# Patient Record
Sex: MALE | Race: WHITE | NOT HISPANIC OR LATINO | Employment: OTHER | URBAN - NONMETROPOLITAN AREA
[De-identification: names, ages, dates, MRNs, and addresses within clinical notes are randomized per-mention and may not be internally consistent; named-entity substitution may affect disease eponyms.]

---

## 2017-01-12 ENCOUNTER — TELEPHONE (OUTPATIENT)
Dept: GASTROENTEROLOGY | Facility: CLINIC | Age: 62
End: 2017-01-12

## 2017-01-12 NOTE — TELEPHONE ENCOUNTER
01/12/2017, 1501 - Patient telephoned per this staff member with reminder of need to repeat Colonoscopy performed 11/03/2016 due to inadequate bowel preparation.  Patient acknowledged reminder and stated he has been instructed per his Primary Care Physician, Dr. Tovar and Nephrologist, Dr. Cespedes, to post phone repeat Colonoscopy at this time due to inadequate kidney functioning.  Patient instructed to contact office when clearance to proceed with Colonoscopy has been approved.  Dr. Green made aware.

## 2017-01-30 ENCOUNTER — OFFICE VISIT (OUTPATIENT)
Dept: CARDIOLOGY | Facility: CLINIC | Age: 62
End: 2017-01-30

## 2017-01-30 VITALS
SYSTOLIC BLOOD PRESSURE: 140 MMHG | WEIGHT: 315 LBS | DIASTOLIC BLOOD PRESSURE: 68 MMHG | HEART RATE: 76 BPM | BODY MASS INDEX: 42.66 KG/M2 | HEIGHT: 72 IN | OXYGEN SATURATION: 97 %

## 2017-01-30 DIAGNOSIS — E78.2 MIXED HYPERLIPIDEMIA: ICD-10-CM

## 2017-01-30 DIAGNOSIS — R94.31 ABNORMAL EKG: ICD-10-CM

## 2017-01-30 DIAGNOSIS — I10 ESSENTIAL HYPERTENSION: Primary | ICD-10-CM

## 2017-01-30 PROCEDURE — 99214 OFFICE O/P EST MOD 30 MIN: CPT | Performed by: INTERNAL MEDICINE

## 2017-01-30 RX ORDER — PRAVASTATIN SODIUM 40 MG
40 TABLET ORAL DAILY
Qty: 31 TABLET | Refills: 6 | Status: SHIPPED | OUTPATIENT
Start: 2017-01-30 | End: 2017-04-14 | Stop reason: HOSPADM

## 2017-01-30 RX ORDER — OMEPRAZOLE 40 MG/1
CAPSULE, DELAYED RELEASE ORAL
COMMUNITY
Start: 2017-01-17 | End: 2017-02-20

## 2017-01-30 NOTE — PROGRESS NOTES
Cardiovascular Medicine   Bharath Dobbins M.D., Ph.D., [facility]        History of Present Illness  This is a 61 y.o. male with:    1. HTN  2. HLD  3. Obesity  4. DM2      HTN  Concerning the patient's hypertension, the patient is currently medication compliant.  Denies side effects.  Patient's laboratory evaluations are followed closely by the PCP.      HLD  Concerning the patient's hyperlipidemia, the patient is on Lopid.     The following portions of the patient's history were reviewed and updated as appropriate: allergies, current medications, past family history, past medical history, past social history, past surgical history and problem list.      Review of Systems - History obtained from the patient  General ROS: negative  Respiratory ROS: no cough, shortness of breath, or wheezing  Cardiovascular ROS: no chest pain or dyspnea on exertion    family history includes Cancer in his other; Diabetes in his other; Other in his other; Stroke in his other.     reports that he has never smoked. He has never used smokeless tobacco. He reports that he does not drink alcohol or use illicit drugs.    No Known Allergies      Current Outpatient Prescriptions:   •  diltiaZEM (TIAZAC) 360 MG 24 hr capsule, TAKE 1 CAPSULE EVERY DAY, Disp: 30 capsule, Rfl: 5  •  Fe Bisgly-Fe [id number] (WILMAN FORTE) capsule, Take 1 tablet by mouth 2 (two) times a day., Disp: , Rfl:   •  gabapentin (NEURONTIN) 100 MG capsule, TAKE 1 CAPSULE AT BEDTIME FOR FOOT PAIN, Disp: 30 capsule, Rfl: 5  •  gemfibrozil (LOPID) 600 MG tablet, Take 600 mg by mouth 2 (two) times a day., Disp: , Rfl:   •  Glucosamine 500 MG capsule, Take 500 mg by mouth 2 (Two) Times a Day., Disp: , Rfl:   •  glucose blood test strip, by Other route. USE ONE TEST STRIP TO TEST BLOOD SUGAR THREE TIMES DAILY, Disp: , Rfl:   •  glyBURIDE (DIABETA) 5 MG tablet, TAKE 2 TABLETS BY MOUTH TWICE DAILY BEFORE MEALS, Disp: 120 tablet, Rfl: 5  •  hydrALAZINE (APRESOLINE) 25 MG  tablet, Take 1 tablet by mouth 3 (Three) Times a Day., Disp: 90 tablet, Rfl: 3  •  HYDROcodone-acetaminophen (NORCO) 5-325 MG per tablet, Take 1 tablet by mouth 4 (Four) Times a Day., Disp: 120 tablet, Rfl: 0  •  lisinopril (PRINIVIL,ZESTRIL) 20 MG tablet, Take 20 mg by mouth daily. For blood pressure, Disp: , Rfl:   •  nateglinide (STARLIX) 120 MG tablet, Take 120 mg by mouth 3 (three) times a day before meals., Disp: , Rfl:   •  Omega-3 Fatty Acids (FISH OIL) 1000 MG capsule capsule, Take 1,000 mg by mouth Daily With Breakfast., Disp: , Rfl:   •  omeprazole (priLOSEC) 40 MG capsule, , Disp: , Rfl:   •  POLY-IRON 150 150 MG capsule, TAKE 1 CAPSULE TWICE DAILY, Disp: 60 capsule, Rfl: 3  •  promethazine (PHENERGAN) 12.5 MG tablet, Take 1 tablet by mouth Every 6 (Six) Hours As Needed (prn)., Disp: 30 tablet, Rfl: 1  •  SITagliptin (JANUVIA) 100 MG tablet, Take 1 tablet by mouth Daily., Disp: 30 tablet, Rfl: 5  •  VITAMIN D, ERGOCALCIFEROL, PO, Take  by mouth Daily., Disp: , Rfl:     Physical Exam:  Vitals:    01/30/17 1513   BP: 140/68   Pulse: 76   SpO2: 97%     Body mass index is 50.7 kg/(m^2).    GEN: alert, appears stated age and cooperative  Body Habitus: obese  HEENT: Head: Normocephalic, no lesions, without obvious abnormality.  Neck / Thyroid: Supple, no masses, nodes, nodules or enlargement. No arcus senilis, xanthelasma or xanthomas. PERRL. Normal external ears. No drainage. No thyromegaly. Neck supple. No LAD. Trachea midline. Nose, normal.  JVP: 6 cm of water at 45 degrees HJR: absent      Carotid:  Upstroke: easily palpated bilaterally Volume: Normal.    Carotid Bruit:  None  Subclavian Bruit: Not present.    Lymph: No overt LAD.   Back: Normal.  Chest:  Normal Excursion: Good    I:E: Good  Pulmonary:clear to auscultation, no wheezes, rales or rhonchi, symmetric air entry. Equal chest excursion. Chest physical exam is normal. No tenderness.        Precordium:  No palpable heaves or thrusts. P2 is not  palpable.   [place]:  normal size and placement Palpable S4: Not present.   Heart rate: normal  Heart Rhythm: regular     Heart Sounds: S1: normal intensity  S2: increased intensity, increased A2  S3: absent   S4: Absent  Opening Snap: absent  A2-OS:  N/A  Pericardial rub: absent    Ejection click: None      Murmurs: Systolic: none  Diastolic: none  Extremity: no edema, cyanosis  Trophic changes: None   Pallor on elevation: Absent.    Rubor on dependency: None  Neuro: CN II-XII grossly intact.   Skin: Normal.    DATA REVIEWED:   Total Cholesterol 0 - 199 mg/dl 198   Comments: CHOL DESIRED: < 200 MG/DL   Triglycerides 20 - 199 mg/dl 128   Comments: TRIG DESIRED: < 200 MG/DL   HDL Cholesterol 60 - 200 mg/dl 36 (L)   Comments: HDL AVERAGE RISK: 35 - 60 MG/DL   LDL Cholesterol  0 - 129 mg/dl 136 (H)   Comments: Calculated LDL results only valid if Triglycerides are < 400 mg/dL.          Assessment/Plan     1. Abnormal EKG. MPS showed no evidence of ischemia or prior infarction.  Ejection fraction preserved.  TTE was structurally normal, other than diastolic dysfunction and LVH.  Reassurance and continue risk factor modifications    2.  Asymptomatic diastolic dysfunction.  Monitor for the development of symptoms    3. HTN, essential.  HD BP noted to be well controlled today in office.    DASH; medication compliance  Continue current medications    4. HLD.   Recommend statin initiation; Start Pravastatin 40mg     5. Cardiac Risk Assessment: 28% 10-yr risk.   Recommended aspirin, but defer until surgery with Dr. Sheldon  Start Pravastatin 40mg    6. Tobacco status: Non-smoker      Plan for follow-up: in  PCP

## 2017-02-06 RX ORDER — IRON POLYSACCHARIDE COMPLEX 150 MG
CAPSULE ORAL
Qty: 60 CAPSULE | Refills: 3 | Status: SHIPPED | OUTPATIENT
Start: 2017-02-06

## 2017-02-10 ENCOUNTER — HOSPITAL ENCOUNTER (OUTPATIENT)
Dept: CT IMAGING | Facility: HOSPITAL | Age: 62
Discharge: HOME | End: 2017-02-10
Admitting: INTERNAL MEDICINE

## 2017-02-10 DIAGNOSIS — K21.9 GASTROESOPHAGEAL REFLUX DISEASE WITHOUT ESOPHAGITIS: ICD-10-CM

## 2017-02-10 DIAGNOSIS — R31.9 BLOOD IN URINE: ICD-10-CM

## 2017-02-10 DIAGNOSIS — I10 ESSENTIAL HYPERTENSION, MALIGNANT: ICD-10-CM

## 2017-02-10 DIAGNOSIS — E11.9 DIABETES MELLITUS WITHOUT COMPLICATION (HCC): ICD-10-CM

## 2017-02-10 DIAGNOSIS — N17.9 ACUTE KIDNEY FAILURE, UNSPECIFIED (HCC): ICD-10-CM

## 2017-02-10 PROCEDURE — 74176 CT ABD & PELVIS W/O CONTRAST: CPT

## 2017-02-14 RX ORDER — GABAPENTIN 100 MG/1
CAPSULE ORAL
Qty: 30 CAPSULE | Refills: 5 | Status: SHIPPED | OUTPATIENT
Start: 2017-02-14

## 2017-02-15 ENCOUNTER — TRANSCRIBE ORDERS (OUTPATIENT)
Dept: LAB | Facility: HOSPITAL | Age: 62
End: 2017-02-15

## 2017-02-15 ENCOUNTER — LAB (OUTPATIENT)
Dept: LAB | Facility: HOSPITAL | Age: 62
End: 2017-02-15

## 2017-02-15 DIAGNOSIS — Z79.1 LONG TERM CURRENT USE OF NON-STEROIDAL ANTI-INFLAMMATORIES (NSAID): ICD-10-CM

## 2017-02-15 DIAGNOSIS — E13.8 DIABETES MELLITUS OF OTHER TYPE WITH COMPLICATION: ICD-10-CM

## 2017-02-15 DIAGNOSIS — R31.9 BLOOD IN URINE: ICD-10-CM

## 2017-02-15 DIAGNOSIS — I10 ESSENTIAL (PRIMARY) HYPERTENSION: ICD-10-CM

## 2017-02-15 DIAGNOSIS — K21.9 GASTROESOPHAGEAL REFLUX DISEASE, ESOPHAGITIS PRESENCE NOT SPECIFIED: ICD-10-CM

## 2017-02-15 DIAGNOSIS — N17.9 ACUTE KIDNEY FAILURE, UNSPECIFIED (HCC): ICD-10-CM

## 2017-02-15 DIAGNOSIS — N17.9 ACUTE KIDNEY FAILURE, UNSPECIFIED (HCC): Primary | ICD-10-CM

## 2017-02-15 LAB
ALBUMIN SERPL-MCNC: 3.8 G/DL (ref 3.4–4.8)
ANION GAP SERPL CALCULATED.3IONS-SCNC: 12 MMOL/L (ref 5–15)
BUN BLD-MCNC: 25 MG/DL (ref 7–21)
BUN/CREAT SERPL: 11.8 (ref 7–25)
CALCIUM SPEC-SCNC: 9.5 MG/DL (ref 8.4–10.2)
CHLORIDE SERPL-SCNC: 107 MMOL/L (ref 95–110)
CO2 SERPL-SCNC: 23 MMOL/L (ref 22–31)
CREAT BLD-MCNC: 2.12 MG/DL (ref 0.7–1.3)
ERYTHROCYTE [SEDIMENTATION RATE] IN BLOOD: 66 MM/HR (ref 0–15)
GFR SERPL CREATININE-BSD FRML MDRD: 32 ML/MIN/1.73 (ref 49–113)
GLUCOSE BLD-MCNC: 129 MG/DL (ref 60–100)
PHOSPHATE SERPL-MCNC: 2.7 MG/DL (ref 2.4–4.4)
POTASSIUM BLD-SCNC: 4 MMOL/L (ref 3.5–5.1)
SODIUM BLD-SCNC: 142 MMOL/L (ref 137–145)
URATE SERPL-MCNC: 8.7 MG/DL (ref 2.5–8.5)

## 2017-02-15 PROCEDURE — 85651 RBC SED RATE NONAUTOMATED: CPT | Performed by: INTERNAL MEDICINE

## 2017-02-15 PROCEDURE — 86160 COMPLEMENT ANTIGEN: CPT | Performed by: INTERNAL MEDICINE

## 2017-02-15 PROCEDURE — 84550 ASSAY OF BLOOD/URIC ACID: CPT | Performed by: INTERNAL MEDICINE

## 2017-02-15 PROCEDURE — 86038 ANTINUCLEAR ANTIBODIES: CPT | Performed by: INTERNAL MEDICINE

## 2017-02-15 PROCEDURE — 84156 ASSAY OF PROTEIN URINE: CPT | Performed by: INTERNAL MEDICINE

## 2017-02-15 PROCEDURE — 80069 RENAL FUNCTION PANEL: CPT | Performed by: INTERNAL MEDICINE

## 2017-02-15 PROCEDURE — 86162 COMPLEMENT TOTAL (CH50): CPT | Performed by: INTERNAL MEDICINE

## 2017-02-15 PROCEDURE — 80074 ACUTE HEPATITIS PANEL: CPT | Performed by: INTERNAL MEDICINE

## 2017-02-15 PROCEDURE — 36415 COLL VENOUS BLD VENIPUNCTURE: CPT

## 2017-02-15 PROCEDURE — 82570 ASSAY OF URINE CREATININE: CPT | Performed by: INTERNAL MEDICINE

## 2017-02-17 LAB
ANA SER QL: NEGATIVE
C3 SERPL-MCNC: 208 MG/DL (ref 82–167)
C4 SERPL-MCNC: 63 MG/DL (ref 14–44)
CH50 SERPL-ACNC: >60 U/ML (ref 42–60)
CREAT 24H UR-MCNC: 110.4 MG/DL
HAV IGM SERPL QL IA: NEGATIVE
HBV CORE IGM SERPL QL IA: NEGATIVE
HBV SURFACE AG SERPL QL IA: NEGATIVE
HCV AB SER DONR QL: NEGATIVE
PROT UR-MCNC: 55.2 MG/DL
PROT/CREAT UR: 500 MG/G CREAT (ref 0–200)

## 2017-02-20 ENCOUNTER — TELEPHONE (OUTPATIENT)
Dept: GASTROENTEROLOGY | Facility: CLINIC | Age: 62
End: 2017-02-20

## 2017-02-20 RX ORDER — PANTOPRAZOLE SODIUM 40 MG/1
40 TABLET, DELAYED RELEASE ORAL DAILY
Qty: 30 TABLET | Refills: 5 | Status: SHIPPED | OUTPATIENT
Start: 2017-02-20 | End: 2017-03-24

## 2017-02-20 NOTE — TELEPHONE ENCOUNTER
02/20/2017, 1333 - Patient telephoned [phone].  Zero answer.  Voice message submitted with date, time, office contact information, and notification of insurance denial of Omeprazole 40 mg, 1 capsule by mouth daily, due to quantity limit exceeded - 90 capsules allowed per 365 days.  Dr. Green aware.  Verbal order received per Dr. Green - Protonix 40 mg, 1 tablet by mouth daily, #30, 5 refills.  New prescription medication e-scribed to patient's pharmacy of choice, [place], [place], per this staff member.

## 2017-02-21 ENCOUNTER — DOCUMENTATION (OUTPATIENT)
Dept: GASTROENTEROLOGY | Facility: CLINIC | Age: 62
End: 2017-02-21

## 2017-02-23 RX ORDER — GLYBURIDE 5 MG/1
TABLET ORAL
Qty: 120 TABLET | Refills: 5 | Status: SHIPPED | OUTPATIENT
Start: 2017-02-23

## 2017-03-07 RX ORDER — NATEGLINIDE 120 MG/1
TABLET ORAL
Qty: 90 TABLET | Refills: 3 | Status: SHIPPED | OUTPATIENT
Start: 2017-03-07 | End: 2017-04-14 | Stop reason: HOSPADM

## 2017-03-15 ENCOUNTER — PREP FOR SURGERY (OUTPATIENT)
Dept: UROLOGY | Facility: HOSPITAL | Age: 62
End: 2017-03-15

## 2017-03-15 DIAGNOSIS — N20.0 CALCULUS OF KIDNEY: Primary | ICD-10-CM

## 2017-03-15 DIAGNOSIS — N21.0 CALCULUS IN DIVERTICULUM OF BLADDER: ICD-10-CM

## 2017-03-15 RX ORDER — LEVOFLOXACIN 5 MG/ML
500 INJECTION, SOLUTION INTRAVENOUS ONCE
Status: CANCELLED | OUTPATIENT
Start: 2017-03-29

## 2017-03-21 NOTE — H&P
[facility] Progress Note  INGRID HARRIS  61-year-old; :1955;;male  [address];[place] [address]  Ins: 1) LUCY/KATERIN  Employer: [employer];  MRN:[id number]  INGRID BRIONES MD  [facility] - [facility]  Mar. 15, 2017 11:52 AM  Allergies  No Known Drug Allergies Unknown  Current Medications  gabapentin 100 mg oral capsule  lisinopril 20 mg Tab 1 Oral  glyBURIDE 5 mg oral tablet 1 Oral  pravastatin 40 mg oral tablet  hydrALAZINE 25 mg oral tablet  metFORMIN 500 mg oral tablet 1 Oral  Poly Iron (as elemental iron) 150 mg oral capsule  Januvia 100 mg oral tablet  nateglinide 120 mg oral tablet  dilTIAZem 360 mg/24 hours oral tablet, extended  release tablet, extended release 1 Oral  * Medications Reconciled  Medical History  Diabetes mellitus  Hypertensive disorder  H/O: kidney disease  Patient reports having had a colonoscopy within  the past 9 years.  Surgical History  COLONOSCOPY  REMOVAL OF HYDROCELE  Psychiatric History  Patient is generally satisfied with life. No  depression, anxiety or thoughts of suicide.  Family History  Family history of cancer  Diabetes mellitus  Hypertensive disorder  Mother  Father  Sister Alive  Social History  Patient does not smoke. Patient does not drink  alcohol. Patient lives with wife.  Imm/Inj/Office Meds History Comments  Patient has had influenza vaccination for this  season.  Patient has had pneumococcal vaccination.  Chief Complaint  Hematuria  History of Present Illness  INGRID HARRIS is a 61-year-old male here for evaluation. He has a history of  hematuria and chronic kidney disease. CT done on 2/10/14 showed right renal stones,  bladder stone and right and left ureteral stones.  Location: Kidney/Ureters  Severity: Mild.  Onset / Duration: >1 month.  Associated signs and symptoms: No fever. Voids okay.  Review of Systems  Eyes: ; Denies: blurry vision, pain in the eyes and double  vision  ENMT: ; Denies: ear pain, sore throat and sinus problems  Cardiovascular: ; Denies: chest pain, varicose veins, angina and syncope  Respiratory: ; Denies: shortness of breath, wheezing and frequent cough  Gastrointestinal: ; Denies: abdominal pain, nausea, vomiting, indigestion and  heartburn  Genitourinary: Reports: other symptoms (see HPI)  Musculoskeletal: Reports: joint pain and back pain; Denies: neck pain  Integumentary: ; Denies: skin rash, boils and persistent itch  Neurological: ; Denies: tremors, dizzy spells and numbness / tingling  Psychiatric: Reports: generally satisfied with life; Denies: depression, suicidal  ideation and anxiety  Endocrine: Reports: cold intolerance and tired/sluggish; Denies: Excessive thirst and  heat intolerance  Hematologic/Lymphatic: ; Denies: swollen glands and blood clotting problem  Allergic/Immunologic: ; Denies: hayfever  Constitutional: ; Denies: fever, chills and weight loss  Vital Signs  3/15/2017 11:52:00 AM  Heart Rate: 87 (/min) Weight: 375 (lb)  Resp Rate: 18 (/min) Height: 73 (in)  BMI: 49.48 Never smoker  BP: 185/94 (mmHg)  Exam  General appearance: The patient appears well developed and well nourished, in no  apparent acute distress.  Examination of pupils and irises: No redness or drainage noted.  Assessment of hearing: Responds to verbal command.  Examination of neck: Neck appears supple. No masses noted.  Assessment of respiratory effort: Respiratory effort appears normal. No apparent  distress.  Examination of gait and station: Normal gait and stature.  Head and neck (Assessment of range of motion): Adequate ROM noted in all  extremities.  Head and neck (Assessment of muscle strength and tone): Muscle strength and tone  normal for age.  Inspection of skin and subcutaneous tissue: Exam of the skin is within normal limits.  The color and skin turgor are normal.  Exam  No lesions, bruises, rashes, or jaundice.  Orientation to time, place and person:  Oriented to person, place, and time.  Mood and affect: Normal mood and affect.  Data Review  Medications and chart reviewed. History and physical form/ROS reviewed and new  form completed today. CT REVIEWED BY PROVIDER.  Assessment - Calculus of kidney and ureter  DX:  Calculus of kidney and ureter  SNO: [id number], ICD-9: 592.0, ICD-10: N20.2  Urinary bladder stone  SNO: [id number], ICD-9: 594.1, ICD-10: N21.0  Plan  Plan Notes:  Bilateral CRULLS; laser lithotripsy of bladder calculus.  Education:  Kidney Stones - English  Discussion:  Discussed my findings and plan of action and reasoning behind decision making. All  questions were answered. FINDINGS WERE DISCUSSED WITH PATIENT. RISKS  AND BENEFITS EXPLAINED. PATIENT WISHES TO PROCEED.  Instructions:  Patient is instructed to call with any problems. Patient is instructed to call if the  condition worsens. Patient is instructed to call with any changes in condition.

## 2017-03-24 ENCOUNTER — APPOINTMENT (OUTPATIENT)
Dept: PREADMISSION TESTING | Facility: HOSPITAL | Age: 62
End: 2017-03-24

## 2017-03-24 VITALS — BODY MASS INDEX: 42.66 KG/M2 | HEIGHT: 72 IN | WEIGHT: 315 LBS

## 2017-03-24 DIAGNOSIS — N20.0 CALCULUS OF KIDNEY: ICD-10-CM

## 2017-03-24 LAB
ANION GAP SERPL CALCULATED.3IONS-SCNC: 14 MMOL/L (ref 5–15)
BUN BLD-MCNC: 33 MG/DL (ref 7–21)
BUN/CREAT SERPL: 15.6 (ref 7–25)
CALCIUM SPEC-SCNC: 9.3 MG/DL (ref 8.4–10.2)
CHLORIDE SERPL-SCNC: 109 MMOL/L (ref 95–110)
CO2 SERPL-SCNC: 22 MMOL/L (ref 22–31)
CREAT BLD-MCNC: 2.11 MG/DL (ref 0.7–1.3)
GFR SERPL CREATININE-BSD FRML MDRD: 32 ML/MIN/1.73 (ref 49–113)
GLUCOSE BLD-MCNC: 140 MG/DL (ref 60–100)
POTASSIUM BLD-SCNC: 4.9 MMOL/L (ref 3.5–5.1)
SODIUM BLD-SCNC: 145 MMOL/L (ref 137–145)

## 2017-03-24 PROCEDURE — 36415 COLL VENOUS BLD VENIPUNCTURE: CPT

## 2017-03-24 PROCEDURE — 80048 BASIC METABOLIC PNL TOTAL CA: CPT | Performed by: ANESTHESIOLOGY

## 2017-03-24 RX ORDER — SODIUM CHLORIDE 9 MG/ML
1000 INJECTION, SOLUTION INTRAVENOUS CONTINUOUS PRN
Status: CANCELLED | OUTPATIENT
Start: 2017-03-24

## 2017-03-24 RX ORDER — OMEPRAZOLE 40 MG/1
40 CAPSULE, DELAYED RELEASE ORAL DAILY
COMMUNITY
End: 2017-04-14 | Stop reason: HOSPADM

## 2017-03-27 RX ORDER — LISINOPRIL 20 MG/1
TABLET ORAL
Qty: 30 TABLET | Refills: 5 | Status: SHIPPED | OUTPATIENT
Start: 2017-03-27 | End: 2017-04-14 | Stop reason: HOSPADM

## 2017-03-28 ENCOUNTER — OFFICE VISIT (OUTPATIENT)
Dept: GASTROENTEROLOGY | Facility: CLINIC | Age: 62
End: 2017-03-28

## 2017-03-28 VITALS
SYSTOLIC BLOOD PRESSURE: 130 MMHG | DIASTOLIC BLOOD PRESSURE: 74 MMHG | WEIGHT: 315 LBS | HEART RATE: 82 BPM | HEIGHT: 73 IN | BODY MASS INDEX: 41.75 KG/M2

## 2017-03-28 DIAGNOSIS — K63.5 COLON POLYPS: Primary | ICD-10-CM

## 2017-03-28 PROCEDURE — 99212 OFFICE O/P EST SF 10 MIN: CPT | Performed by: INTERNAL MEDICINE

## 2017-03-28 RX ORDER — SODIUM CHLORIDE 0.9 % (FLUSH) 0.9 %
1-10 SYRINGE (ML) INJECTION AS NEEDED
Status: CANCELLED | OUTPATIENT
Start: 2017-03-28

## 2017-03-28 RX ORDER — LIDOCAINE HYDROCHLORIDE 10 MG/ML
0.1 INJECTION, SOLUTION EPIDURAL; INFILTRATION; INTRACAUDAL; PERINEURAL ONCE AS NEEDED
Status: CANCELLED | OUTPATIENT
Start: 2017-03-28

## 2017-03-28 RX ORDER — PANTOPRAZOLE SODIUM 40 MG/1
40 TABLET, DELAYED RELEASE ORAL DAILY
COMMUNITY

## 2017-03-28 RX ORDER — DEXTROSE AND SODIUM CHLORIDE 5; .45 G/100ML; G/100ML
30 INJECTION, SOLUTION INTRAVENOUS CONTINUOUS PRN
Status: CANCELLED | OUTPATIENT
Start: 2017-03-28

## 2017-03-29 ENCOUNTER — ANESTHESIA (OUTPATIENT)
Dept: PERIOP | Facility: HOSPITAL | Age: 62
End: 2017-03-29

## 2017-03-29 ENCOUNTER — APPOINTMENT (OUTPATIENT)
Dept: GENERAL RADIOLOGY | Facility: HOSPITAL | Age: 62
End: 2017-03-29

## 2017-03-29 ENCOUNTER — HOSPITAL ENCOUNTER (OUTPATIENT)
Facility: HOSPITAL | Age: 62
Setting detail: HOSPITAL OUTPATIENT SURGERY
Discharge: HOME | End: 2017-03-29
Attending: UROLOGY | Admitting: UROLOGY

## 2017-03-29 ENCOUNTER — ANESTHESIA EVENT (OUTPATIENT)
Dept: PERIOP | Facility: HOSPITAL | Age: 62
End: 2017-03-29

## 2017-03-29 ENCOUNTER — HOSPITAL ENCOUNTER (OUTPATIENT)
Facility: HOSPITAL | Age: 62
Setting detail: HOSPITAL OUTPATIENT SURGERY
End: 2017-03-29
Attending: INTERNAL MEDICINE | Admitting: INTERNAL MEDICINE

## 2017-03-29 VITALS
DIASTOLIC BLOOD PRESSURE: 56 MMHG | RESPIRATION RATE: 18 BRPM | WEIGHT: 315 LBS | HEART RATE: 88 BPM | BODY MASS INDEX: 42.66 KG/M2 | HEIGHT: 72 IN | SYSTOLIC BLOOD PRESSURE: 122 MMHG | TEMPERATURE: 98.4 F | OXYGEN SATURATION: 94 %

## 2017-03-29 DIAGNOSIS — N20.0 CALCULUS OF KIDNEY: ICD-10-CM

## 2017-03-29 DIAGNOSIS — N20.0 KIDNEY STONE: ICD-10-CM

## 2017-03-29 DIAGNOSIS — N21.0 BLADDER STONE: Primary | ICD-10-CM

## 2017-03-29 LAB
BILIRUB UR QL STRIP: NEGATIVE
CLARITY UR: ABNORMAL
COLOR UR: ABNORMAL
GLUCOSE BLDC GLUCOMTR-MCNC: 103 MG/DL (ref 70–130)
GLUCOSE BLDC GLUCOMTR-MCNC: 147 MG/DL (ref 70–130)
GLUCOSE UR STRIP-MCNC: NEGATIVE MG/DL
HGB UR QL STRIP.AUTO: ABNORMAL
KETONES UR QL STRIP: NEGATIVE
LEUKOCYTE ESTERASE UR QL STRIP.AUTO: ABNORMAL
NITRITE UR QL STRIP: NEGATIVE
PH UR STRIP.AUTO: <=5 [PH] (ref 5–9)
PROT UR QL STRIP: ABNORMAL
SP GR UR STRIP: 1.02 (ref 1–1.03)
UROBILINOGEN UR QL STRIP: ABNORMAL

## 2017-03-29 PROCEDURE — C1758 CATHETER, URETERAL: HCPCS | Performed by: UROLOGY

## 2017-03-29 PROCEDURE — 76000 FLUOROSCOPY <1 HR PHYS/QHP: CPT

## 2017-03-29 PROCEDURE — 0 IOPAMIDOL 61 % SOLUTION: Performed by: UROLOGY

## 2017-03-29 PROCEDURE — 81003 URINALYSIS AUTO W/O SCOPE: CPT | Performed by: UROLOGY

## 2017-03-29 PROCEDURE — 82360 CALCULUS ASSAY QUANT: CPT | Performed by: UROLOGY

## 2017-03-29 PROCEDURE — C1769 GUIDE WIRE: HCPCS | Performed by: UROLOGY

## 2017-03-29 PROCEDURE — 25010000002 LEVOFLOXACIN PER 250 MG: Performed by: UROLOGY

## 2017-03-29 PROCEDURE — 74420 UROGRAPHY RTRGR +-KUB: CPT

## 2017-03-29 PROCEDURE — C2617 STENT, NON-COR, TEM W/O DEL: HCPCS | Performed by: UROLOGY

## 2017-03-29 PROCEDURE — 25010000002 MIDAZOLAM PER 1 MG: Performed by: NURSE ANESTHETIST, CERTIFIED REGISTERED

## 2017-03-29 PROCEDURE — 82962 GLUCOSE BLOOD TEST: CPT

## 2017-03-29 PROCEDURE — 25010000002 FENTANYL CITRATE (PF) 100 MCG/2ML SOLUTION: Performed by: NURSE ANESTHETIST, CERTIFIED REGISTERED

## 2017-03-29 PROCEDURE — 25010000002 PROPOFOL 10 MG/ML EMULSION: Performed by: NURSE ANESTHETIST, CERTIFIED REGISTERED

## 2017-03-29 DEVICE — URETERAL STENT
Type: IMPLANTABLE DEVICE | Status: FUNCTIONAL
Brand: PERCUFLEX™ PLUS

## 2017-03-29 RX ORDER — MIDAZOLAM HYDROCHLORIDE 1 MG/ML
INJECTION INTRAMUSCULAR; INTRAVENOUS AS NEEDED
Status: DISCONTINUED | OUTPATIENT
Start: 2017-03-29 | End: 2017-03-29 | Stop reason: SURG

## 2017-03-29 RX ORDER — ACETAMINOPHEN 650 MG/1
650 SUPPOSITORY RECTAL ONCE AS NEEDED
Status: DISCONTINUED | OUTPATIENT
Start: 2017-03-29 | End: 2017-03-29 | Stop reason: HOSPADM

## 2017-03-29 RX ORDER — LABETALOL HYDROCHLORIDE 5 MG/ML
5 INJECTION, SOLUTION INTRAVENOUS
Status: DISCONTINUED | OUTPATIENT
Start: 2017-03-29 | End: 2017-03-29 | Stop reason: HOSPADM

## 2017-03-29 RX ORDER — SODIUM CHLORIDE 9 MG/ML
1000 INJECTION, SOLUTION INTRAVENOUS CONTINUOUS PRN
Status: DISCONTINUED | OUTPATIENT
Start: 2017-03-29 | End: 2017-03-29 | Stop reason: HOSPADM

## 2017-03-29 RX ORDER — PROPOFOL 10 MG/ML
VIAL (ML) INTRAVENOUS AS NEEDED
Status: DISCONTINUED | OUTPATIENT
Start: 2017-03-29 | End: 2017-03-29 | Stop reason: SURG

## 2017-03-29 RX ORDER — ACETAMINOPHEN 325 MG/1
650 TABLET ORAL ONCE AS NEEDED
Status: DISCONTINUED | OUTPATIENT
Start: 2017-03-29 | End: 2017-03-29 | Stop reason: HOSPADM

## 2017-03-29 RX ORDER — LEVOFLOXACIN 5 MG/ML
500 INJECTION, SOLUTION INTRAVENOUS ONCE
Status: COMPLETED | OUTPATIENT
Start: 2017-03-29 | End: 2017-03-29

## 2017-03-29 RX ORDER — NALOXONE HCL 0.4 MG/ML
0.2 VIAL (ML) INJECTION AS NEEDED
Status: DISCONTINUED | OUTPATIENT
Start: 2017-03-29 | End: 2017-03-29 | Stop reason: HOSPADM

## 2017-03-29 RX ORDER — DIPHENHYDRAMINE HYDROCHLORIDE 50 MG/ML
12.5 INJECTION INTRAMUSCULAR; INTRAVENOUS
Status: DISCONTINUED | OUTPATIENT
Start: 2017-03-29 | End: 2017-03-29 | Stop reason: HOSPADM

## 2017-03-29 RX ORDER — DOXYCYCLINE HYCLATE 100 MG/1
100 CAPSULE ORAL DAILY
Qty: 7 CAPSULE | Refills: 0 | Status: SHIPPED | OUTPATIENT
Start: 2017-03-29 | End: 2017-04-14 | Stop reason: HOSPADM

## 2017-03-29 RX ORDER — OXYCODONE AND ACETAMINOPHEN 7.5; 325 MG/1; MG/1
1-2 TABLET ORAL EVERY 4 HOURS PRN
Qty: 15 TABLET | Refills: 0 | Status: SHIPPED | OUTPATIENT
Start: 2017-03-29 | End: 2017-04-14 | Stop reason: HOSPADM

## 2017-03-29 RX ORDER — FENTANYL CITRATE 50 UG/ML
INJECTION, SOLUTION INTRAMUSCULAR; INTRAVENOUS AS NEEDED
Status: DISCONTINUED | OUTPATIENT
Start: 2017-03-29 | End: 2017-03-29 | Stop reason: SURG

## 2017-03-29 RX ORDER — FLUMAZENIL 0.1 MG/ML
0.2 INJECTION INTRAVENOUS AS NEEDED
Status: DISCONTINUED | OUTPATIENT
Start: 2017-03-29 | End: 2017-03-29 | Stop reason: HOSPADM

## 2017-03-29 RX ORDER — ONDANSETRON 2 MG/ML
4 INJECTION INTRAMUSCULAR; INTRAVENOUS ONCE AS NEEDED
Status: DISCONTINUED | OUTPATIENT
Start: 2017-03-29 | End: 2017-03-29 | Stop reason: HOSPADM

## 2017-03-29 RX ADMIN — PROPOFOL 200 MG: 10 INJECTION, EMULSION INTRAVENOUS at 18:10

## 2017-03-29 RX ADMIN — MIDAZOLAM 1 MG: 1 INJECTION INTRAMUSCULAR; INTRAVENOUS at 18:09

## 2017-03-29 RX ADMIN — MIDAZOLAM 1 MG: 1 INJECTION INTRAMUSCULAR; INTRAVENOUS at 18:05

## 2017-03-29 RX ADMIN — SODIUM CHLORIDE 1000 ML: 9 INJECTION, SOLUTION INTRAVENOUS at 13:51

## 2017-03-29 RX ADMIN — LEVOFLOXACIN 500 MG: 5 INJECTION, SOLUTION INTRAVENOUS at 18:13

## 2017-03-29 RX ADMIN — FENTANYL CITRATE 50 MCG: 50 INJECTION, SOLUTION INTRAMUSCULAR; INTRAVENOUS at 18:10

## 2017-03-29 NOTE — ANESTHESIA PROCEDURE NOTES
Airway    General Information and Staff    Patient location during procedure: OR  CRNA: DIEGO WOODSON    Indications and Patient Condition  Indications for airway management: airway protection    Preoxygenated: yes      Final Airway Details  Final airway type: supraglottic airway      Successful airway: classic  Size 4    Number of attempts at approach: 1

## 2017-03-29 NOTE — OP NOTE
CYSTOSCOPY, URETEROSCOPY, RETROGRADE PYELOGRAM, HOLMIUM LASER, STENT INSERTION  Procedure Note    Blake Chavez  3/29/2017    Pre-op Diagnosis:   Calculus of kidney [N20.0]    Post-op Diagnosis:     Post-Op Diagnosis Codes:     * Calculus of kidney [N20.0]      Procedure(s):  CYSTOSCOPY, BILATERAL RETROGRADE,  RIGHT URETEROSCOPY AND LASER LITHOTRIPSY WITH STENT PLACEMENT; LASER LITHOTRIPSY BLADDER CALCULI    Surgeon(s):  Blake Lopez MD    Anesthesia: General    Staff:   Circulator: Aziza Prince RN  Scrub Person: Yeni Lam V  Assistant: Bobbi Carolina CSA    Estimated Blood Loss: * No values recorded between 3/29/2017  6:05 PM and 3/29/2017  6:43 PM *    Specimens:                * No specimens in log *      Drains:           Findings: Bladder stone 1.0 cm, right renal pelvis with stones.  Clear left renal pelvis    Complications: None    Indications: Hematuria and pain    Description of Procedure: Patient was brought to the cystoscopy suite and placed in the dorsal lithotomy position.  Once this occurred we placed a 22 cystoscope into the bladder after sterile prep and drape.  Engaged a 1.0 cm bladder stone.  1000 µ fiber and blasted this with a prior setting of 3.5 continuous pulses.  We flushed out the stone debris from the bladder itself with the advisorCONNECT evacuator.  This would place a retrograde catheter up the right ureter and injected 6 cc of contrast.  Right renal filling defects was noted as consistent with stones.  We put a 0.38 guidewire guidewire back in the right renal pelvis and an over-the-top of the guidewire we placed a 26 double-J stent.  We turned our attention to the left ureter.  Put a retrograde catheter up the ureter and injected 6 cc of contrast.  The left renal pelvis collecting system all was clear. Once this was accomplished we drained the bladder and removed the scope.  Fluoroscopy showed J stent was in good position on the right-hand side.    Blake Lopez MD      Date: 3/29/2017  Time: 6:43 PM

## 2017-03-29 NOTE — ANESTHESIA POSTPROCEDURE EVALUATION
Patient: Blake Chavez    Procedure Summary     Date Anesthesia Start Anesthesia Stop Room / Location    03/29/17 1809 3395 [facility] OR 02 / [facility] OR       Procedure Diagnosis Surgeon Provider    CYSTOSCOPY, BILATERAL RETROGRADE, URETEROSCOPY, LASER LITHOTRIPSY, STENT PLACEMENT; LASER LITHOTRIPSY BLADDER CALCULI (Bilateral ) Calculus of kidney  (Calculus of kidney [N20.0]) MD Meri Dukes MD          Anesthesia Type: general  Last vitals  BP      Temp      Pulse     Resp      SpO2        Post Anesthesia Care and Evaluation    Patient location during evaluation: PACU  Patient participation: complete - patient participated  Level of consciousness: awake and alert  Pain management: adequate  Airway patency: patent  Anesthetic complications: No anesthetic complications    Cardiovascular status: acceptable  Respiratory status: acceptable  Hydration status: acceptable

## 2017-03-29 NOTE — ANESTHESIA PREPROCEDURE EVALUATION
Anesthesia Evaluation     NPO Status: > 8 hours   Airway   Mallampati: II  TM distance: >3 FB  Neck ROM: full  possible difficult intubation  Dental    (+) poor dentation    Comment: Lower crowns;overall poor repair.    Pulmonary - normal exam    breath sounds clear to auscultation  Cardiovascular - normal exam    ECG reviewed  Rhythm: regular  Rate: normal    (+) hypertension well controlled,     ROS comment: EKG:NSR    Neuro/Psych  GI/Hepatic/Renal/Endo    (+) morbid obesity, chronic renal disease (Creatinine 2.11) stones, diabetes mellitus type 2 well controlled,     Musculoskeletal     Abdominal   (+) obese,    Substance History      OB/GYN          Other   (+) arthritis                                 Anesthesia Plan    ASA 4     general     intravenous induction   Anesthetic plan and risks discussed with patient and spouse/significant other.

## 2017-03-30 NOTE — PLAN OF CARE
Problem: Patient Care Overview (Adult)  Goal: Plan of Care Review  Outcome: Ongoing (interventions implemented as appropriate)    03/29/17 3962   Coping/Psychosocial Response Interventions   Plan Of Care Reviewed With patient   Patient Care Overview   Progress improving   Outcome Evaluation   Outcome Summary/Follow up Plan PACU DC criteria met. Pt. awake, alert, oriented. Pt. denies pain or nausea, requesting to go to the bathroom. Only complains of urinary urgency. Declines pain medication in PACU. Declines use of urinal or assistance with urinal.

## 2017-04-01 NOTE — PROGRESS NOTES
Chief Complaint   Patient presents with   • Colonoscopy Consultation        Blake Chavez is a  61 y.o. male here for a follow up visit for colonic polyps     HPI :  The patient is seen to arrange follow-up colonoscopy and attempt to remove the rather large polyp that 100 cm level.  We took off polyps at the time of his last colonoscopy but it that time prep was such that could not get a good look at the larger polyp.  He returns presently for that.    In addition he had a large venous structure on his perineum that was seen with Dr. Sheldon in addition he has had other medical problems including most recently a kidney stone for which she is scheduled for removal later this week.    Past Medical History:   Diagnosis Date   • Acute cystitis    • Calculus of kidney and ureter     recently passed      • Chest pain    • Crohn's disease    • Edema     unspecified - bilateral lower extremities     • Essential hypertension    • Hip pain    • Hypertensive disorder    • Knee pain    • Morbid obesity    • Nausea    • Nuclear senile cataract    • Osteoarthritis of multiple joints    • Paraumbilical hernia    • Type 2 diabetes mellitus      no BDR    • Type 2 diabetes mellitus      no BDR        Current Outpatient Prescriptions   Medication Sig Dispense Refill   • diltiaZEM (TIAZAC) 360 MG 24 hr capsule TAKE 1 CAPSULE EVERY DAY 30 capsule 5   • FE BISGLY-FE [id number]-I-[id number]-FA PO Take  by mouth. 1 tablet by mouth 2 times per day     • gabapentin (NEURONTIN) 100 MG capsule TAKE 1 CAPSULE AT BEDTIME FOR FOOT PAIN 30 capsule 5   • Glucosamine 500 MG capsule Take 500 mg by mouth 2 (Two) Times a Day.     • glyBURIDE (DIAbeta) 5 MG tablet TAKE 2 TABLETS TWICE DAILY BEFORE MEALS 120 tablet 5   • hydrALAZINE (APRESOLINE) 25 MG tablet Take 1 tablet by mouth 3 (Three) Times a Day. 90 tablet 3   • lisinopril (PRINIVIL,ZESTRIL) 20 MG tablet TAKE 1 TABLET BY MOUTH EVERY DAY FOR BLOOD PRESSURE 30 tablet 5   • nateglinide (STARLIX) 120 MG  tablet TAKE 1 TABLET BY MOUTH BEFORE MEALS 90 tablet 3   • Omega-3 Fatty Acids (FISH OIL) 1000 MG capsule capsule Take 1,000 mg by mouth Daily With Breakfast.     • omeprazole (priLOSEC) 40 MG capsule Take 40 mg by mouth Daily.     • ONE TOUCH ULTRA TEST test strip USE THREE TIMES DAILY 100 each 11   • pantoprazole (PROTONIX) 40 MG EC tablet Take 40 mg by mouth Daily.     • POLY-IRON 150 150 MG capsule TAKE 1 CAPSULE TWICE DAILY 60 capsule 3   • pravastatin (PRAVACHOL) 40 MG tablet Take 1 tablet by mouth Daily. 31 tablet 6   • promethazine (PHENERGAN) 12.5 MG tablet Take 1 tablet by mouth Every 6 (Six) Hours As Needed (prn). 30 tablet 1   • SITagliptin (JANUVIA) 100 MG tablet Take 1 tablet by mouth Daily. 30 tablet 5   • VITAMIN D, ERGOCALCIFEROL, PO Take  by mouth Daily.     • doxycycline (VIBRAMYCIN) 100 MG capsule Take 1 capsule by mouth Daily for 7 days. 7 capsule 0   • oxyCODONE-acetaminophen (PERCOCET) 7.5-325 MG per tablet Take 1-2 tablets by mouth Every 4 (Four) Hours As Needed (Pain). 15 tablet 0     No current facility-administered medications for this visit.        PRN Meds:.    No Known Allergies    Social History     Social History   • Marital status:      Spouse name: N/A   • Number of children: N/A   • Years of education: N/A     Occupational History   • Not on file.     Social History Main Topics   • Smoking status: Never Smoker   • Smokeless tobacco: Never Used   • Alcohol use No   • Drug use: No   • Sexual activity: Defer     Other Topics Concern   • Not on file     Social History Narrative       Family History   Problem Relation Age of Onset   • Cancer Other    • Diabetes Other    • Stroke Other    • Other Other      Colon problems        Review of Systems   Constitutional: Negative for activity change, chills, diaphoresis and unexpected weight change.   Cardiovascular: Negative for chest pain.   Gastrointestinal: Positive for abdominal pain. Negative for abdominal distention, anal  bleeding, blood in stool, constipation, diarrhea, nausea, rectal pain and vomiting.   Musculoskeletal: Negative for arthralgias and myalgias.       Vitals:    03/28/17 1546   BP: 130/74   Pulse: 82       Physical Exam   Constitutional: He appears well-developed and well-nourished.   Cardiovascular: Normal rate, regular rhythm and normal heart sounds.  Exam reveals no gallop and no friction rub.    No murmur heard.  Pulmonary/Chest: Effort normal and breath sounds normal. No respiratory distress. He has no rales.   Abdominal: Soft. Normal appearance and bowel sounds are normal. He exhibits no distension and no ascites. There is no hepatosplenomegaly, splenomegaly or hepatomegaly. There is no tenderness. No hernia.   Nursing note and vitals reviewed.      ASSESSMENT AND PLAN      ICD-10-CM ICD-9-CM   1. Colon polyps K63.5 211.3      Plan:  Colonoscopy with removal of the remaining polyp.          This document has been electronically signed by Nestor Green MD on April 1, 2017 4:47 PM

## 2017-04-04 ENCOUNTER — HOSPITAL ENCOUNTER (OUTPATIENT)
Facility: HOSPITAL | Age: 62
Setting detail: HOSPITAL OUTPATIENT SURGERY
End: 2017-04-04
Attending: UROLOGY | Admitting: UROLOGY

## 2017-04-04 ENCOUNTER — PREP FOR SURGERY (OUTPATIENT)
Dept: UROLOGY | Facility: HOSPITAL | Age: 62
End: 2017-04-04

## 2017-04-04 DIAGNOSIS — N20.0 CALCULUS OF KIDNEY: Primary | ICD-10-CM

## 2017-04-04 RX ORDER — LEVOFLOXACIN 5 MG/ML
500 INJECTION, SOLUTION INTRAVENOUS ONCE
Status: CANCELLED | OUTPATIENT
Start: 2017-04-07

## 2017-04-04 NOTE — H&P
[facility] Progress Note  INGRID HARRIS  61-year-old; :1955;;male  [address];[place] [address]  Ins: 1) [employer]UCY[employer]  Employer: [employer];  MRN:[id number]  INGRID BRIONES MD  [facility]  2017 3:22 PM  Allergies  No Known Drug Allergies Unknown  Current Medications  gabapentin 100 mg oral capsule  lisinopril 20 mg Tab 1 Oral  glyBURIDE 5 mg oral tablet 1 Oral  pravastatin 40 mg oral tablet  hydrALAZINE 25 mg oral tablet  metFORMIN 500 mg oral tablet 1 Oral  Poly Iron (as elemental iron) 150 mg oral capsule  Januvia 100 mg oral tablet  nateglinide 120 mg oral tablet  dilTIAZem 360 mg/24 hours oral tablet, extended  release tablet, extended release 1 Oral  * Medications Reconciled  Medical History  Diabetes mellitus  Hypertensive disorder  H/O: kidney disease  Patient reports having had a colonoscopy within  the past 9 years.  Surgical History  COLONOSCOPY  REMOVAL OF HYDROCELE  Psychiatric History  Patient is generally satisfied with life. No  depression, anxiety or thoughts of suicide.  Family History  Family history of cancer  Diabetes mellitus  Hypertensive disorder  Mother  Father  Sister Alive  Social History  Patient does not smoke. Patient does not drink  alcohol. Patient lives with wife.  Imm/Inj/Office Meds History Comments  Patient has had influenza vaccination for this  season.  Patient has had pneumococcal vaccination.  Chief Complaint  Kidney stone  History of Present Illness  INGRID HARRIS is a 61-year-old male here for evaluation. He has a history of  hematuria and chronic kidney disease. Patient underwent a bilateral CRULLS procedure which showed a right renal calculus and a clear left renal pelvis. He had a j-stent placed on the right.  Location: Kidney/Ureters  Severity: Mild.  Onset / Duration: >1 month.  Associated signs and symptoms: No fever. Voids okay.  Review of Systems  Eyes: ;  Denies: blurry vision, pain in the eyes and double vision  ENMT: ; Denies: ear pain, sore throat and sinus problems  Cardiovascular: ; Denies: chest pain, varicose veins, angina and syncope  Respiratory: ; Denies: shortness of breath, wheezing and frequent cough  Gastrointestinal: ; Denies: abdominal pain, nausea, vomiting, indigestion and  heartburn  Genitourinary: Reports: other symptoms (see HPI)  Musculoskeletal: Reports: joint pain and back pain; Denies: neck pain  Integumentary: ; Denies: skin rash, boils and persistent itch  Neurological: ; Denies: tremors, dizzy spells and numbness / tingling  Psychiatric: Reports: generally satisfied with life; Denies: depression, suicidal  ideation and anxiety  Endocrine: Reports: cold intolerance and tired/sluggish; Denies: Excessive thirst and  heat intolerance  Hematologic/Lymphatic: ; Denies: swollen glands and blood clotting problem  Allergic/Immunologic: ; Denies: hayfever  Constitutional: ; Denies: fever, chills and weight loss  Vital Signs  3/15/2017 11:52:00 AM  Heart Rate: 87 (/min) Weight: 375 (lb)  Resp Rate: 18 (/min) Height: 73 (in)  BMI: 49.48 Never smoker  BP: 185/94 (mmHg)  Exam  General appearance: The patient appears well developed and well nourished, in no  apparent acute distress.  Examination of pupils and irises: No redness or drainage noted.  Assessment of hearing: Responds to verbal command.  Examination of neck: Neck appears supple. No masses noted.  Assessment of respiratory effort: Respiratory effort appears normal. No apparent  distress.  Examination of gait and station: Normal gait and stature.  Head and neck (Assessment of range of motion): Adequate ROM noted in all  extremities.  Head and neck (Assessment of muscle strength and tone): Muscle strength and tone  normal for age.  Inspection of skin and subcutaneous tissue: Exam of the skin is within normal limits.  The color and skin turgor are normal.  No lesions, bruises, rashes, or  jaundice.  Orientation to time, place and person: Oriented to person, place, and time.  Mood and affect: Normal mood and affect.  Data Review  Medications and chart reviewed. History and physical form/ROS reviewed and new  form completed today.   Assessment - Calculus of kidney and ureter  DX:  Calculus of kidney and ureter  SNO: [id number], ICD-9: 592.0, ICD-10: N20.2  Urinary bladder stone  SNO: [id number], ICD-9: 594.1, ICD-10: N21.0  Plan  Plan Notes:  RIGHT ESWL; POSSIBLE CYSTOSCOPY, STENT REMOVAL at [facility] 4/7/17.  Education:  Kidney Stones - English  Discussion:  Discussed my findings and plan of action and reasoning behind decision making. All  questions were answered. FINDINGS WERE DISCUSSED WITH PATIENT. RISKS  AND BENEFITS EXPLAINED. PATIENT WISHES TO PROCEED.  Instructions:  Patient is instructed to call with any problems. Patient is instructed to call if the  condition worsens. Patient is instructed to call with any changes in condition.

## 2017-04-05 ENCOUNTER — HOSPITAL ENCOUNTER (INPATIENT)
Facility: HOSPITAL | Age: 62
LOS: 9 days | Discharge: ANOTHER HOSPITAL | End: 2017-04-14
Attending: FAMILY MEDICINE | Admitting: FAMILY MEDICINE

## 2017-04-05 ENCOUNTER — APPOINTMENT (OUTPATIENT)
Dept: LAB | Facility: HOSPITAL | Age: 62
End: 2017-04-05

## 2017-04-05 ENCOUNTER — APPOINTMENT (OUTPATIENT)
Dept: NUCLEAR MEDICINE | Facility: HOSPITAL | Age: 62
End: 2017-04-05

## 2017-04-05 DIAGNOSIS — K63.5 COLON POLYPS: ICD-10-CM

## 2017-04-05 DIAGNOSIS — I27.20 PULMONARY HYPERTENSION (HCC): Primary | ICD-10-CM

## 2017-04-05 DIAGNOSIS — J15.9 PNEUMONIA, BACTERIAL: Primary | ICD-10-CM

## 2017-04-05 DIAGNOSIS — Z74.09 IMPAIRED PHYSICAL MOBILITY: ICD-10-CM

## 2017-04-05 PROBLEM — I21.4 NSTEMI (NON-ST ELEVATED MYOCARDIAL INFARCTION) (HCC): Status: ACTIVE | Noted: 2017-04-05

## 2017-04-05 PROBLEM — E66.01 MORBID OBESITY WITH BMI OF 50.0-59.9, ADULT (HCC): Status: ACTIVE | Noted: 2017-04-05

## 2017-04-05 LAB
APTT PPP: 38.3 SECONDS (ref 20–40.3)
ARTERIAL PATENCY WRIST A: ABNORMAL
ATMOSPHERIC PRESS: ABNORMAL MMHG
BACTERIA SPEC RESP CULT: NORMAL
BACTERIA UR QL AUTO: ABNORMAL /HPF
BASE EXCESS BLDA CALC-SCNC: -5 MMOL/L (ref -2.4–2.4)
BDY SITE: ABNORMAL
BILIRUB UR QL STRIP: NEGATIVE
CA PHOS CRY STONE QL IR: 3 %
CA-I BLD-MCNC: 4.9 MG/DL (ref 4.5–4.9)
CK MB SERPL-CCNC: 2.27 NG/ML (ref 0–5)
CK MB SERPL-CCNC: 2.47 NG/ML (ref 0–5)
CK SERPL-CCNC: 67 U/L (ref 55–170)
CK SERPL-CCNC: 75 U/L (ref 55–170)
CLARITY UR: ABNORMAL
CO2 BLDA-SCNC: 19.6 MMOL/L (ref 23–27)
COLOR STONE: NORMAL
COLOR UR: YELLOW
COM CRY STONE QL IR: 30 %
COMPN STONE: NORMAL
GLUCOSE BLDA-MCNC: 203 MMOL/L
GLUCOSE BLDC GLUCOMTR-MCNC: 193 MG/DL (ref 70–130)
GLUCOSE UR STRIP-MCNC: NEGATIVE MG/DL
GRAM STN SPEC: NORMAL
HCO3 BLDA-SCNC: 18.7 MMOL/L (ref 22–26)
HCT VFR BLD CALC: 30 % (ref 40–54)
HGB BLDA-MCNC: 10.3 G/DL (ref 14–18)
HGB UR QL STRIP.AUTO: ABNORMAL
HOLD SPECIMEN: NORMAL
HYALINE CASTS UR QL AUTO: ABNORMAL /LPF
INR PPP: 1.28 (ref 0.8–1.2)
KETONES UR QL STRIP: NEGATIVE
L PNEUMO1 AG UR QL IA: NEGATIVE
LEUKOCYTE ESTERASE UR QL STRIP.AUTO: ABNORMAL
Lab: NORMAL
Lab: NORMAL
MODALITY: ABNORMAL
NA URATE MFR STONE IR: 2 %
NIDUS STONE QL: NORMAL
NITRITE UR QL STRIP: NEGATIVE
PATH REPORT.COMMENTS IMP SPEC: NORMAL
PCO2 BLDA: 30 MM HG (ref 35–45)
PH BLDA: 7.41 PH UNITS (ref 7.35–7.45)
PH UR STRIP.AUTO: 5.5 [PH] (ref 5–9)
PO2 BLDA: 62.9 MM HG (ref 80–105)
POTASSIUM BLDA-SCNC: 4.24 MMOL/L (ref 3.6–4.9)
PROT UR QL STRIP: ABNORMAL
PROTHROMBIN TIME: 16 SECONDS (ref 11.1–15.3)
RBC # UR: ABNORMAL /HPF
REF LAB TEST METHOD: ABNORMAL
S PNEUM AG SPEC QL LA: NEGATIVE
SAO2 % BLDCOA: 91.3 %
SIZE STONE: NORMAL MM
SODIUM BLDA-SCNC: 145.5 MMOL/L (ref 138–146)
SP GR UR STRIP: 1.02 (ref 1–1.03)
SQUAMOUS #/AREA URNS HPF: ABNORMAL /HPF
TROPONIN I SERPL-MCNC: 1.21 NG/ML
TROPONIN I SERPL-MCNC: 1.28 NG/ML
URATE MFR STONE: 65 %
UROBILINOGEN UR QL STRIP: ABNORMAL
WBC UR QL AUTO: ABNORMAL /HPF
WHOLE BLOOD HOLD SPECIMEN: NORMAL
WHOLE BLOOD HOLD SPECIMEN: NORMAL
WT STONE: 112 MG

## 2017-04-05 PROCEDURE — 85379 FIBRIN DEGRADATION QUANT: CPT | Performed by: NURSE PRACTITIONER

## 2017-04-05 PROCEDURE — 82550 ASSAY OF CK (CPK): CPT | Performed by: FAMILY MEDICINE

## 2017-04-05 PROCEDURE — 84484 ASSAY OF TROPONIN QUANT: CPT | Performed by: FAMILY MEDICINE

## 2017-04-05 PROCEDURE — 87899 AGENT NOS ASSAY W/OPTIC: CPT | Performed by: FAMILY MEDICINE

## 2017-04-05 PROCEDURE — A9540 TC99M MAA: HCPCS | Performed by: FAMILY MEDICINE

## 2017-04-05 PROCEDURE — 82803 BLOOD GASES ANY COMBINATION: CPT | Performed by: FAMILY MEDICINE

## 2017-04-05 PROCEDURE — 93010 ELECTROCARDIOGRAM REPORT: CPT | Performed by: INTERNAL MEDICINE

## 2017-04-05 PROCEDURE — 82962 GLUCOSE BLOOD TEST: CPT

## 2017-04-05 PROCEDURE — 0 TECHNETIUM ALBUMIN AGGREGATED: Performed by: FAMILY MEDICINE

## 2017-04-05 PROCEDURE — 36600 WITHDRAWAL OF ARTERIAL BLOOD: CPT

## 2017-04-05 PROCEDURE — 87581 M.PNEUMON DNA AMP PROBE: CPT | Performed by: FAMILY MEDICINE

## 2017-04-05 PROCEDURE — 25010000002 PIPERACILLIN SOD-TAZOBACTAM PER 1 G: Performed by: FAMILY MEDICINE

## 2017-04-05 PROCEDURE — 83880 ASSAY OF NATRIURETIC PEPTIDE: CPT | Performed by: NURSE PRACTITIONER

## 2017-04-05 PROCEDURE — 87040 BLOOD CULTURE FOR BACTERIA: CPT | Performed by: FAMILY MEDICINE

## 2017-04-05 PROCEDURE — 93005 ELECTROCARDIOGRAM TRACING: CPT | Performed by: FAMILY MEDICINE

## 2017-04-05 PROCEDURE — 87086 URINE CULTURE/COLONY COUNT: CPT | Performed by: FAMILY MEDICINE

## 2017-04-05 PROCEDURE — 87205 SMEAR GRAM STAIN: CPT | Performed by: FAMILY MEDICINE

## 2017-04-05 PROCEDURE — 80053 COMPREHEN METABOLIC PANEL: CPT | Performed by: NURSE PRACTITIONER

## 2017-04-05 PROCEDURE — 82553 CREATINE MB FRACTION: CPT | Performed by: FAMILY MEDICINE

## 2017-04-05 PROCEDURE — 99233 SBSQ HOSP IP/OBS HIGH 50: CPT | Performed by: FAMILY MEDICINE

## 2017-04-05 PROCEDURE — 78580 LUNG PERFUSION IMAGING: CPT

## 2017-04-05 PROCEDURE — 25010000002 HEPARIN (PORCINE) PER 1000 UNITS: Performed by: FAMILY MEDICINE

## 2017-04-05 PROCEDURE — 85730 THROMBOPLASTIN TIME PARTIAL: CPT | Performed by: FAMILY MEDICINE

## 2017-04-05 PROCEDURE — 87449 NOS EACH ORGANISM AG IA: CPT | Performed by: FAMILY MEDICINE

## 2017-04-05 PROCEDURE — 81001 URINALYSIS AUTO W/SCOPE: CPT | Performed by: FAMILY MEDICINE

## 2017-04-05 PROCEDURE — 85025 COMPLETE CBC W/AUTO DIFF WBC: CPT | Performed by: NURSE PRACTITIONER

## 2017-04-05 PROCEDURE — 85610 PROTHROMBIN TIME: CPT | Performed by: FAMILY MEDICINE

## 2017-04-05 PROCEDURE — 63710000001 INSULIN ASPART PER 5 UNITS: Performed by: FAMILY MEDICINE

## 2017-04-05 RX ORDER — HYDROCODONE BITARTRATE AND ACETAMINOPHEN 10; 325 MG/1; MG/1
1 TABLET ORAL EVERY 6 HOURS PRN
Status: DISCONTINUED | OUTPATIENT
Start: 2017-04-05 | End: 2017-04-14 | Stop reason: HOSPADM

## 2017-04-05 RX ORDER — HEPARIN SODIUM 5000 [USP'U]/ML
3000 INJECTION, SOLUTION INTRAVENOUS; SUBCUTANEOUS AS NEEDED
Status: DISCONTINUED | OUTPATIENT
Start: 2017-04-05 | End: 2017-04-08

## 2017-04-05 RX ORDER — GABAPENTIN 100 MG/1
100 CAPSULE ORAL NIGHTLY
Status: DISCONTINUED | OUTPATIENT
Start: 2017-04-05 | End: 2017-04-14 | Stop reason: HOSPADM

## 2017-04-05 RX ORDER — HEPARIN SODIUM 5000 [USP'U]/ML
2000 INJECTION, SOLUTION INTRAVENOUS; SUBCUTANEOUS AS NEEDED
Status: DISCONTINUED | OUTPATIENT
Start: 2017-04-05 | End: 2017-04-08

## 2017-04-05 RX ORDER — NICOTINE POLACRILEX 4 MG
15 LOZENGE BUCCAL
Status: DISCONTINUED | OUTPATIENT
Start: 2017-04-05 | End: 2017-04-14 | Stop reason: HOSPADM

## 2017-04-05 RX ORDER — PANTOPRAZOLE SODIUM 40 MG/1
40 TABLET, DELAYED RELEASE ORAL DAILY
Status: DISCONTINUED | OUTPATIENT
Start: 2017-04-05 | End: 2017-04-14 | Stop reason: HOSPADM

## 2017-04-05 RX ORDER — DEXTROSE MONOHYDRATE 25 G/50ML
25 INJECTION, SOLUTION INTRAVENOUS
Status: DISCONTINUED | OUTPATIENT
Start: 2017-04-05 | End: 2017-04-14 | Stop reason: HOSPADM

## 2017-04-05 RX ORDER — PRAVASTATIN SODIUM 40 MG
40 TABLET ORAL NIGHTLY
Status: DISCONTINUED | OUTPATIENT
Start: 2017-04-05 | End: 2017-04-06

## 2017-04-05 RX ORDER — DILTIAZEM HYDROCHLORIDE 180 MG/1
360 CAPSULE, EXTENDED RELEASE ORAL
Status: DISCONTINUED | OUTPATIENT
Start: 2017-04-05 | End: 2017-04-06

## 2017-04-05 RX ORDER — HEPARIN SODIUM 5000 [USP'U]/ML
5000 INJECTION, SOLUTION INTRAVENOUS; SUBCUTANEOUS EVERY 12 HOURS SCHEDULED
Status: DISCONTINUED | OUTPATIENT
Start: 2017-04-05 | End: 2017-04-05

## 2017-04-05 RX ORDER — LISINOPRIL 20 MG/1
20 TABLET ORAL
Status: DISCONTINUED | OUTPATIENT
Start: 2017-04-05 | End: 2017-04-06

## 2017-04-05 RX ORDER — SODIUM CHLORIDE 0.9 % (FLUSH) 0.9 %
1-10 SYRINGE (ML) INJECTION AS NEEDED
Status: CANCELLED | OUTPATIENT
Start: 2017-04-05

## 2017-04-05 RX ORDER — ASPIRIN 325 MG
325 TABLET ORAL ONCE
Status: COMPLETED | OUTPATIENT
Start: 2017-04-05 | End: 2017-04-05

## 2017-04-05 RX ORDER — SODIUM CHLORIDE 0.9 % (FLUSH) 0.9 %
1-10 SYRINGE (ML) INJECTION AS NEEDED
Status: DISCONTINUED | OUTPATIENT
Start: 2017-04-05 | End: 2017-04-14 | Stop reason: HOSPADM

## 2017-04-05 RX ORDER — HYDRALAZINE HYDROCHLORIDE 25 MG/1
25 TABLET, FILM COATED ORAL 2 TIMES DAILY
Status: DISCONTINUED | OUTPATIENT
Start: 2017-04-05 | End: 2017-04-14

## 2017-04-05 RX ORDER — HEPARIN SODIUM 10000 [USP'U]/100ML
1000 INJECTION, SOLUTION INTRAVENOUS
Status: DISCONTINUED | OUTPATIENT
Start: 2017-04-05 | End: 2017-04-08

## 2017-04-05 RX ORDER — HEPARIN SODIUM 5000 [USP'U]/ML
5000 INJECTION, SOLUTION INTRAVENOUS; SUBCUTANEOUS ONCE
Status: COMPLETED | OUTPATIENT
Start: 2017-04-05 | End: 2017-04-05

## 2017-04-05 RX ORDER — HEPARIN SODIUM 5000 [USP'U]/ML
5000 INJECTION, SOLUTION INTRAVENOUS; SUBCUTANEOUS EVERY 12 HOURS SCHEDULED
Status: CANCELLED | OUTPATIENT
Start: 2017-04-05

## 2017-04-05 RX ADMIN — PRAVASTATIN SODIUM 40 MG: 40 TABLET ORAL at 21:02

## 2017-04-05 RX ADMIN — ASPIRIN 325 MG: 325 TABLET, COATED ORAL at 18:38

## 2017-04-05 RX ADMIN — HYDRALAZINE HYDROCHLORIDE 25 MG: 25 TABLET ORAL at 18:15

## 2017-04-05 RX ADMIN — PANTOPRAZOLE SODIUM 40 MG: 40 TABLET, DELAYED RELEASE ORAL at 18:15

## 2017-04-05 RX ADMIN — INSULIN ASPART 2 UNITS: 100 INJECTION, SOLUTION INTRAVENOUS; SUBCUTANEOUS at 21:03

## 2017-04-05 RX ADMIN — GABAPENTIN 100 MG: 100 CAPSULE ORAL at 21:02

## 2017-04-05 RX ADMIN — HYDROCODONE BITARTRATE AND ACETAMINOPHEN 1 TABLET: 10; 325 TABLET ORAL at 21:02

## 2017-04-05 RX ADMIN — HEPARIN SODIUM 5000 UNITS: 5000 INJECTION, SOLUTION INTRAVENOUS; SUBCUTANEOUS at 21:06

## 2017-04-05 RX ADMIN — HEPARIN SODIUM 1000 UNITS/HR: 10000 INJECTION, SOLUTION INTRAVENOUS at 21:08

## 2017-04-05 RX ADMIN — Medication 1 DOSE: at 20:00

## 2017-04-05 RX ADMIN — TAZOBACTAM SODIUM AND PIPERACILLIN SODIUM 4.5 G: 500; 4 INJECTION, SOLUTION INTRAVENOUS at 18:15

## 2017-04-06 ENCOUNTER — APPOINTMENT (OUTPATIENT)
Dept: CT IMAGING | Facility: HOSPITAL | Age: 62
End: 2017-04-06

## 2017-04-06 ENCOUNTER — APPOINTMENT (OUTPATIENT)
Dept: CARDIOLOGY | Facility: HOSPITAL | Age: 62
End: 2017-04-06
Attending: INTERNAL MEDICINE

## 2017-04-06 ENCOUNTER — APPOINTMENT (OUTPATIENT)
Dept: ULTRASOUND IMAGING | Facility: HOSPITAL | Age: 62
End: 2017-04-06

## 2017-04-06 ENCOUNTER — APPOINTMENT (OUTPATIENT)
Dept: GENERAL RADIOLOGY | Facility: HOSPITAL | Age: 62
End: 2017-04-06

## 2017-04-06 PROBLEM — N30.00 ACUTE CYSTITIS: Status: ACTIVE | Noted: 2017-04-06

## 2017-04-06 LAB
ALBUMIN SERPL-MCNC: 3.6 G/DL (ref 3.4–4.8)
ANION GAP SERPL CALCULATED.3IONS-SCNC: 17 MMOL/L (ref 5–15)
APTT PPP: 32 SECONDS (ref 20–40.3)
APTT PPP: 37.5 SECONDS (ref 20–40.3)
APTT PPP: 39.2 SECONDS (ref 20–40.3)
APTT PPP: 40.4 SECONDS (ref 20–40.3)
ARTERIAL PATENCY WRIST A: ABNORMAL
ATMOSPHERIC PRESS: ABNORMAL MMHG
BASE EXCESS BLDA CALC-SCNC: -7.2 MMOL/L (ref -2.4–2.4)
BDY SITE: ABNORMAL
BH CV ECHO MEAS - % IVS THICK: 4 %
BH CV ECHO MEAS - ACS: 2.2 CM
BH CV ECHO MEAS - AO MAX PG (FULL): 4.5 MMHG
BH CV ECHO MEAS - AO MAX PG: 9.8 MMHG
BH CV ECHO MEAS - AO MEAN PG (FULL): 3.3 MMHG
BH CV ECHO MEAS - AO MEAN PG: 6.1 MMHG
BH CV ECHO MEAS - AO ROOT AREA: 9.9 CM^2
BH CV ECHO MEAS - AO ROOT DIAM: 3.5 CM
BH CV ECHO MEAS - AO V2 MAX: 156.2 CM/SEC
BH CV ECHO MEAS - AO V2 MEAN: 115.7 CM/SEC
BH CV ECHO MEAS - AO V2 VTI: 24.8 CM
BH CV ECHO MEAS - AVA(I,A): 2.5 CM^2
BH CV ECHO MEAS - AVA(I,D): 2.5 CM^2
BH CV ECHO MEAS - AVA(V,A): 2.5 CM^2
BH CV ECHO MEAS - AVA(V,D): 2.5 CM^2
BH CV ECHO MEAS - EDV(CUBED): 356.9 ML
BH CV ECHO MEAS - EDV(TEICH): 263.2 ML
BH CV ECHO MEAS - EF(CUBED): 72.4 %
BH CV ECHO MEAS - EF(TEICH): 62.7 %
BH CV ECHO MEAS - ESV(CUBED): 98.4 ML
BH CV ECHO MEAS - ESV(TEICH): 98.1 ML
BH CV ECHO MEAS - FS: 34.9 %
BH CV ECHO MEAS - IVS/LVPW: 1.1
BH CV ECHO MEAS - IVSD: 1.4 CM
BH CV ECHO MEAS - IVSS: 1.5 CM
BH CV ECHO MEAS - LA DIMENSION: 4.7 CM
BH CV ECHO MEAS - LA/AO: 1.3
BH CV ECHO MEAS - LV MASS(C)D: 485.7 GRAMS
BH CV ECHO MEAS - LV MAX PG: 5.3 MMHG
BH CV ECHO MEAS - LV MEAN PG: 2.7 MMHG
BH CV ECHO MEAS - LV V1 MAX: 114.9 CM/SEC
BH CV ECHO MEAS - LV V1 MEAN: 76.4 CM/SEC
BH CV ECHO MEAS - LV V1 VTI: 17.8 CM
BH CV ECHO MEAS - LVIDD: 7.1 CM
BH CV ECHO MEAS - LVIDS: 4.6 CM
BH CV ECHO MEAS - LVOT AREA (M): 3.5 CM^2
BH CV ECHO MEAS - LVOT AREA: 3.4 CM^2
BH CV ECHO MEAS - LVOT DIAM: 2.1 CM
BH CV ECHO MEAS - LVPWD: 1.3 CM
BH CV ECHO MEAS - MR MAX PG: 77.3 MMHG
BH CV ECHO MEAS - MR MAX VEL: 439.4 CM/SEC
BH CV ECHO MEAS - MV A MAX VEL: 65.6 CM/SEC
BH CV ECHO MEAS - MV DEC SLOPE: 766.5 CM/SEC^2
BH CV ECHO MEAS - MV E MAX VEL: 125.3 CM/SEC
BH CV ECHO MEAS - MV E/A: 1.9
BH CV ECHO MEAS - MV MAX PG: 9.7 MMHG
BH CV ECHO MEAS - MV MEAN PG: 3.8 MMHG
BH CV ECHO MEAS - MV P1/2T MAX VEL: 153.8 CM/SEC
BH CV ECHO MEAS - MV P1/2T: 58.8 MSEC
BH CV ECHO MEAS - MV V2 MAX: 155.3 CM/SEC
BH CV ECHO MEAS - MV V2 MEAN: 91.1 CM/SEC
BH CV ECHO MEAS - MV V2 VTI: 27.9 CM
BH CV ECHO MEAS - MVA P1/2T LCG: 1.4 CM^2
BH CV ECHO MEAS - MVA(P1/2T): 3.7 CM^2
BH CV ECHO MEAS - MVA(VTI): 2.2 CM^2
BH CV ECHO MEAS - PA MAX PG: 5 MMHG
BH CV ECHO MEAS - PA V2 MAX: 111.3 CM/SEC
BH CV ECHO MEAS - RAP SYSTOLE: 10 MMHG
BH CV ECHO MEAS - RVDD: 3.3 CM
BH CV ECHO MEAS - RVSP: 66 MMHG
BH CV ECHO MEAS - SV(AO): 245.4 ML
BH CV ECHO MEAS - SV(CUBED): 258.5 ML
BH CV ECHO MEAS - SV(LVOT): 61.2 ML
BH CV ECHO MEAS - SV(TEICH): 165 ML
BH CV ECHO MEAS - TR MAX VEL: 314.7 CM/SEC
BUN BLD-MCNC: 41 MG/DL (ref 7–21)
BUN/CREAT SERPL: 13.2 (ref 7–25)
CA-I BLD-MCNC: 4.9 MG/DL (ref 4.5–4.9)
CALCIUM SPEC-SCNC: 9.3 MG/DL (ref 8.4–10.2)
CHLORIDE SERPL-SCNC: 113 MMOL/L (ref 95–110)
CK MB SERPL-CCNC: 2.45 NG/ML (ref 0–5)
CK MB SERPL-CCNC: 2.53 NG/ML (ref 0–5)
CK SERPL-CCNC: 72 U/L (ref 55–170)
CK SERPL-CCNC: 73 U/L (ref 55–170)
CO2 BLDA-SCNC: 18.4 MMOL/L (ref 23–27)
CO2 SERPL-SCNC: 18 MMOL/L (ref 22–31)
CREAT BLD-MCNC: 3.1 MG/DL (ref 0.7–1.3)
DEPRECATED RDW RBC AUTO: 41.9 FL (ref 35.1–43.9)
ERYTHROCYTE [DISTWIDTH] IN BLOOD BY AUTOMATED COUNT: 15.1 % (ref 11.5–14.5)
GFR SERPL CREATININE-BSD FRML MDRD: 20 ML/MIN/1.73 (ref 49–113)
GLUCOSE BLD-MCNC: 196 MG/DL (ref 60–100)
GLUCOSE BLDA-MCNC: 241 MMOL/L
GLUCOSE BLDC GLUCOMTR-MCNC: 182 MG/DL (ref 70–130)
GLUCOSE BLDC GLUCOMTR-MCNC: 214 MG/DL (ref 70–130)
GLUCOSE BLDC GLUCOMTR-MCNC: 228 MG/DL (ref 70–130)
GLUCOSE BLDC GLUCOMTR-MCNC: 229 MG/DL (ref 70–130)
HCO3 BLDA-SCNC: 17.4 MMOL/L (ref 22–26)
HCT VFR BLD AUTO: 28.5 % (ref 39–49)
HCT VFR BLD CALC: 29 % (ref 40–54)
HGB BLD-MCNC: 9 G/DL (ref 13.7–17.3)
HGB BLDA-MCNC: 10 G/DL (ref 14–18)
MCH RBC QN AUTO: 23.9 PG (ref 26.5–34)
MCHC RBC AUTO-ENTMCNC: 31.6 G/DL (ref 31.5–36.3)
MCV RBC AUTO: 75.6 FL (ref 80–98)
MODALITY: ABNORMAL
MYCOPLASMAE PNEUMONIAE BY PCR: NEGATIVE
PCO2 BLDA: 32.1 MM HG (ref 35–45)
PH BLDA: 7.35 PH UNITS (ref 7.35–7.45)
PHOSPHATE SERPL-MCNC: 3.9 MG/DL (ref 2.4–4.4)
PLATELET # BLD AUTO: 267 10*3/MM3 (ref 150–450)
PMV BLD AUTO: 8.4 FL (ref 8–12)
PO2 BLDA: 205.4 MM HG (ref 80–105)
POTASSIUM BLD-SCNC: 4.4 MMOL/L (ref 3.5–5.1)
POTASSIUM BLDA-SCNC: 4.51 MMOL/L (ref 3.6–4.9)
RBC # BLD AUTO: 3.77 10*6/MM3 (ref 4.37–5.74)
SAO2 % BLDCOA: 99.2 %
SODIUM BLD-SCNC: 148 MMOL/L (ref 137–145)
SODIUM BLDA-SCNC: 144.1 MMOL/L (ref 138–146)
TROPONIN I SERPL-MCNC: 1.22 NG/ML
TROPONIN I SERPL-MCNC: 1.25 NG/ML
WBC NRBC COR # BLD: 15.23 10*3/MM3 (ref 3.2–9.8)

## 2017-04-06 PROCEDURE — 25010000002 FUROSEMIDE PER 20 MG

## 2017-04-06 PROCEDURE — 94799 UNLISTED PULMONARY SVC/PX: CPT

## 2017-04-06 PROCEDURE — 36600 WITHDRAWAL OF ARTERIAL BLOOD: CPT

## 2017-04-06 PROCEDURE — 94760 N-INVAS EAR/PLS OXIMETRY 1: CPT

## 2017-04-06 PROCEDURE — 84484 ASSAY OF TROPONIN QUANT: CPT | Performed by: FAMILY MEDICINE

## 2017-04-06 PROCEDURE — 94660 CPAP INITIATION&MGMT: CPT

## 2017-04-06 PROCEDURE — 25010000002 PIPERACILLIN SOD-TAZOBACTAM PER 1 G: Performed by: INTERNAL MEDICINE

## 2017-04-06 PROCEDURE — 25010000002 HEPARIN (PORCINE) PER 1000 UNITS: Performed by: FAMILY MEDICINE

## 2017-04-06 PROCEDURE — 25010000002 FUROSEMIDE PER 20 MG: Performed by: FAMILY MEDICINE

## 2017-04-06 PROCEDURE — 82553 CREATINE MB FRACTION: CPT | Performed by: FAMILY MEDICINE

## 2017-04-06 PROCEDURE — 85730 THROMBOPLASTIN TIME PARTIAL: CPT | Performed by: FAMILY MEDICINE

## 2017-04-06 PROCEDURE — 99233 SBSQ HOSP IP/OBS HIGH 50: CPT | Performed by: INTERNAL MEDICINE

## 2017-04-06 PROCEDURE — 93306 TTE W/DOPPLER COMPLETE: CPT | Performed by: INTERNAL MEDICINE

## 2017-04-06 PROCEDURE — 99232 SBSQ HOSP IP/OBS MODERATE 35: CPT | Performed by: FAMILY MEDICINE

## 2017-04-06 PROCEDURE — 25010000002 PIPERACILLIN SOD-TAZOBACTAM PER 1 G: Performed by: FAMILY MEDICINE

## 2017-04-06 PROCEDURE — 82550 ASSAY OF CK (CPK): CPT | Performed by: FAMILY MEDICINE

## 2017-04-06 PROCEDURE — 93005 ELECTROCARDIOGRAM TRACING: CPT | Performed by: FAMILY MEDICINE

## 2017-04-06 PROCEDURE — 25010000002 VANCOMYCIN PER 500 MG: Performed by: FAMILY MEDICINE

## 2017-04-06 PROCEDURE — 82962 GLUCOSE BLOOD TEST: CPT

## 2017-04-06 PROCEDURE — 80069 RENAL FUNCTION PANEL: CPT | Performed by: FAMILY MEDICINE

## 2017-04-06 PROCEDURE — 93306 TTE W/DOPPLER COMPLETE: CPT

## 2017-04-06 PROCEDURE — 93970 EXTREMITY STUDY: CPT

## 2017-04-06 PROCEDURE — 71010 HC CHEST PA OR AP: CPT

## 2017-04-06 PROCEDURE — 82803 BLOOD GASES ANY COMBINATION: CPT | Performed by: FAMILY MEDICINE

## 2017-04-06 PROCEDURE — 93010 ELECTROCARDIOGRAM REPORT: CPT | Performed by: INTERNAL MEDICINE

## 2017-04-06 PROCEDURE — 85027 COMPLETE CBC AUTOMATED: CPT | Performed by: FAMILY MEDICINE

## 2017-04-06 PROCEDURE — 63710000001 INSULIN ASPART PER 5 UNITS: Performed by: FAMILY MEDICINE

## 2017-04-06 RX ORDER — FUROSEMIDE 10 MG/ML
40 INJECTION INTRAMUSCULAR; INTRAVENOUS ONCE
Status: COMPLETED | OUTPATIENT
Start: 2017-04-06 | End: 2017-04-06

## 2017-04-06 RX ORDER — SODIUM CHLORIDE 450 MG/100ML
75 INJECTION, SOLUTION INTRAVENOUS CONTINUOUS
Status: DISCONTINUED | OUTPATIENT
Start: 2017-04-06 | End: 2017-04-06

## 2017-04-06 RX ORDER — CLOPIDOGREL BISULFATE 75 MG/1
600 TABLET ORAL ONCE
Status: COMPLETED | OUTPATIENT
Start: 2017-04-06 | End: 2017-04-06

## 2017-04-06 RX ORDER — ATORVASTATIN CALCIUM 40 MG/1
80 TABLET, FILM COATED ORAL NIGHTLY
Status: DISCONTINUED | OUTPATIENT
Start: 2017-04-06 | End: 2017-04-14 | Stop reason: HOSPADM

## 2017-04-06 RX ORDER — FUROSEMIDE 10 MG/ML
INJECTION INTRAMUSCULAR; INTRAVENOUS
Status: COMPLETED
Start: 2017-04-06 | End: 2017-04-06

## 2017-04-06 RX ORDER — CLOPIDOGREL BISULFATE 75 MG/1
75 TABLET ORAL DAILY
Status: DISCONTINUED | OUTPATIENT
Start: 2017-04-07 | End: 2017-04-14 | Stop reason: HOSPADM

## 2017-04-06 RX ADMIN — HEPARIN SODIUM 3000 UNITS: 5000 INJECTION, SOLUTION INTRAVENOUS; SUBCUTANEOUS at 04:52

## 2017-04-06 RX ADMIN — VANCOMYCIN HYDROCHLORIDE 2500 MG: 500 INJECTION, POWDER, LYOPHILIZED, FOR SOLUTION INTRAVENOUS at 00:08

## 2017-04-06 RX ADMIN — HYDRALAZINE HYDROCHLORIDE 25 MG: 25 TABLET ORAL at 18:03

## 2017-04-06 RX ADMIN — PANTOPRAZOLE SODIUM 40 MG: 40 TABLET, DELAYED RELEASE ORAL at 10:31

## 2017-04-06 RX ADMIN — FUROSEMIDE 40 MG: 10 INJECTION, SOLUTION INTRAMUSCULAR; INTRAVENOUS at 20:52

## 2017-04-06 RX ADMIN — TAZOBACTAM SODIUM AND PIPERACILLIN SODIUM 2.25 G: 250; 2 INJECTION, SOLUTION INTRAVENOUS at 23:52

## 2017-04-06 RX ADMIN — HYDROCODONE BITARTRATE AND ACETAMINOPHEN 1 TABLET: 10; 325 TABLET ORAL at 07:28

## 2017-04-06 RX ADMIN — TAZOBACTAM SODIUM AND PIPERACILLIN SODIUM 4.5 G: 500; 4 INJECTION, SOLUTION INTRAVENOUS at 06:49

## 2017-04-06 RX ADMIN — METOPROLOL TARTRATE 25 MG: 25 TABLET ORAL at 21:33

## 2017-04-06 RX ADMIN — TAZOBACTAM SODIUM AND PIPERACILLIN SODIUM 4.5 G: 500; 4 INJECTION, SOLUTION INTRAVENOUS at 12:14

## 2017-04-06 RX ADMIN — METOPROLOL TARTRATE 25 MG: 25 TABLET ORAL at 10:31

## 2017-04-06 RX ADMIN — FUROSEMIDE: 10 INJECTION, SOLUTION INTRAMUSCULAR; INTRAVENOUS at 21:00

## 2017-04-06 RX ADMIN — GABAPENTIN 100 MG: 100 CAPSULE ORAL at 21:34

## 2017-04-06 RX ADMIN — INSULIN ASPART 3 UNITS: 100 INJECTION, SOLUTION INTRAVENOUS; SUBCUTANEOUS at 21:41

## 2017-04-06 RX ADMIN — CLOPIDOGREL BISULFATE 600 MG: 75 TABLET ORAL at 10:30

## 2017-04-06 RX ADMIN — TAZOBACTAM SODIUM AND PIPERACILLIN SODIUM 2.25 G: 250; 2 INJECTION, SOLUTION INTRAVENOUS at 18:07

## 2017-04-06 RX ADMIN — ATORVASTATIN CALCIUM 80 MG: 40 TABLET, FILM COATED ORAL at 21:33

## 2017-04-06 RX ADMIN — HEPARIN SODIUM 1400 UNITS/HR: 10000 INJECTION, SOLUTION INTRAVENOUS at 16:20

## 2017-04-06 RX ADMIN — HYDRALAZINE HYDROCHLORIDE 25 MG: 25 TABLET ORAL at 10:31

## 2017-04-06 RX ADMIN — HEPARIN SODIUM 3000 UNITS: 5000 INJECTION, SOLUTION INTRAVENOUS; SUBCUTANEOUS at 16:21

## 2017-04-06 RX ADMIN — INSULIN ASPART 3 UNITS: 100 INJECTION, SOLUTION INTRAVENOUS; SUBCUTANEOUS at 12:14

## 2017-04-06 RX ADMIN — INSULIN ASPART 2 UNITS: 100 INJECTION, SOLUTION INTRAVENOUS; SUBCUTANEOUS at 07:29

## 2017-04-06 RX ADMIN — LISINOPRIL 20 MG: 20 TABLET ORAL at 10:31

## 2017-04-06 RX ADMIN — Medication: at 18:04

## 2017-04-06 RX ADMIN — SODIUM BICARBONATE 50 MEQ: 84 INJECTION INTRAVENOUS at 21:40

## 2017-04-06 RX ADMIN — SITAGLIPTIN 50 MG: 25 TABLET, FILM COATED ORAL at 10:31

## 2017-04-06 RX ADMIN — SODIUM CHLORIDE 75 ML/HR: 4.5 INJECTION, SOLUTION INTRAVENOUS at 18:55

## 2017-04-06 RX ADMIN — INSULIN ASPART 3 UNITS: 100 INJECTION, SOLUTION INTRAVENOUS; SUBCUTANEOUS at 18:03

## 2017-04-06 NOTE — PLAN OF CARE
Problem: Patient Care Overview (Adult)  Goal: Plan of Care Review  Outcome: Ongoing (interventions implemented as appropriate)    04/06/17 0604   Coping/Psychosocial Response Interventions   Plan Of Care Reviewed With patient;spouse   Patient Care Overview   Progress no change       Goal: Adult Individualization and Mutuality  Outcome: Ongoing (interventions implemented as appropriate)    Problem: Fall Risk (Adult)  Goal: Absence of Falls  Outcome: Ongoing (interventions implemented as appropriate)    04/06/17 0604   Fall Risk (Adult)   Absence of Falls achieves outcome

## 2017-04-06 NOTE — CONSULTS
[facility]  [address] [place] [address]. [address]  T - [phone]  F  [phone]     Consultation         PATIENT  DEMOGRAPHICS   PATIENT NAME: Blake Chavez                      PHYSICIAN: Alanna Brand MD  : 1955  MRN: [id number]    Subjective   SUBJECTIVE   Referring Provider: Dr Childers  Reason for Consultation: brennan ckd 3 nephrolithiasis  History of present illness:      Mr Chavez is here with progressive dyspnea. He also has some chest discomfort in the center with no radiation. He has extensive history of nephrolithiasis and right hydro and dr Lopez placed a stent on right. He is going to need eswl which is coming Friday. His baseline cr few months ago was <1 and then upto >3 and came down to 2.1. On this admission it is again 3.1. He also has peripheral edema. Denies any loin pain or hematuria. He has dm-2 for nearly 6 years without any proliferative retinopathy. He also has anorectal mass on colonoscopy.     On further workup he has elevated troponin and is on heparin drip. He also has possible pna and is on antibiotic. Denies any active pain or difficulty in urination.     Past Medical History:   Diagnosis Date   • Acute cystitis    • BPH (benign prostatic hypertrophy)    • Calculus of kidney and ureter     recently passed      • Chest pain    • Crohn's disease    • Edema     unspecified - bilateral lower extremities     • Essential hypertension    • Hip pain    • Hypertensive disorder    • Knee pain    • Morbid obesity    • Nausea    • Nuclear senile cataract    • Osteoarthritis of multiple joints    • Paraumbilical hernia    • Right hand fracture     as a teen   • Severe concentric left ventricular hypertrophy    • Type 2 diabetes mellitus      no BDR    • Type 2 diabetes mellitus      no BDR      Past Surgical History:   Procedure Laterality Date   • COLONOSCOPY  ; 216    Diverticulosis in sigmoid colon. 1 polyp in sigmoid colon; removed by cold  biopsy polypectomy. Hemorrhoids found   • CYSTOSCOPY, URETEROSCOPY, RETROGRADE PYELOGRAM, STENT INSERTION Bilateral 3/29/2017    Procedure: CYSTOSCOPY, BILATERAL RETROGRADE, URETEROSCOPY, LASER LITHOTRIPSY, STENT PLACEMENT; LASER LITHOTRIPSY BLADDER CALCULI;  Surgeon: Blake Lopez MD;  Location: [facility];  Service:    • HYDROCELE EXCISION / REPAIR     • INJECTION OF MEDICATION  04/18/2016    Kenalog   • UPPER GASTROINTESTINAL ENDOSCOPY  11/03/2016     Family History   Problem Relation Age of Onset   • Cancer Other    • Diabetes Other    • Stroke Other    • Other Other      Colon problems    • Diabetes Mother    • Heart failure Mother    • Colon cancer Father    • Breast cancer Paternal Grandmother      Social History   Substance Use Topics   • Smoking status: Never Smoker   • Smokeless tobacco: Never Used   • Alcohol use No     Allergies:  Review of patient's allergies indicates no known allergies.     REVIEW OF SYSTEMS    Review of Systems   Constitutional: Negative for chills and fever.   Respiratory: Positive for shortness of breath. Negative for chest tightness.    Cardiovascular: Positive for chest pain. Negative for leg swelling.   Gastrointestinal: Negative for abdominal pain, diarrhea and nausea.   Genitourinary: Negative for dysuria, flank pain and hematuria.   Neurological: Negative for dizziness, syncope and weakness.       Objective   OBJECTIVE   Vital Signs  Temp:  [97.2 °F (36.2 °C)-97.7 °F (36.5 °C)] 97.2 °F (36.2 °C)  Heart Rate:  [] 94  Resp:  [20-22] 20  BP: (121-157)/(61-83) 157/83    Flowsheet Rows         First Filed Value    Admission Height      Admission Weight  (!)  386 lb 6.4 oz (175 kg) Documented at 04/05/2017 1659           I/O last 3 completed shifts:  In: 100 [IV Piggyback:100]  Out: 250 [Urine:250]    PHYSICAL EXAM    Physical Exam   Constitutional: He is oriented to person, place, and time. He appears well-developed.   HENT:   Head: Normocephalic.   Eyes: Pupils are equal,  round, and reactive to light.   Cardiovascular: Normal rate, regular rhythm and normal heart sounds.    Pulmonary/Chest: Effort normal and breath sounds normal.   Abdominal: Soft. Bowel sounds are normal.   Musculoskeletal: He exhibits edema.   Neurological: He is alert and oriented to person, place, and time.       RESULTS   Results Review:      Results from last 7 days  Lab Units 04/06/17  0245 04/05/17  1725 04/05/17  1147   SODIUM mmol/L 148*  --  149*   SODIUM, ARTERIAL mmol/L  --  145.5  --    POTASSIUM mmol/L 4.4  --  4.8   CHLORIDE mmol/L 113*  --  113*   TOTAL CO2 mmol/L 18.0*  --  20.0*   BUN mg/dL 41*  --  40*   CREATININE mg/dL 3.10*  --  2.99*   CALCIUM mg/dL 9.3  --  9.5   BILIRUBIN mg/dL  --   --  0.4   ALK PHOS U/L  --   --  120   ALT (SGPT) U/L  --   --  12*   AST (SGOT) U/L  --   --  19   GLUCOSE mg/dL 196*  --  180*   GLUCOSE, ARTERIAL mmol/L  --  203  --        Estimated Creatinine Clearance: 40.7 mL/min (by C-G formula based on Cr of 3.1).      Results from last 7 days  Lab Units 04/06/17  0245   PHOSPHORUS mg/dL 3.9               Results from last 7 days  Lab Units 04/06/17  0245 04/05/17  1147   WBC 10*3/mm3 15.23* 13.98*   HEMOGLOBIN g/dL 9.0* 8.9*   PLATELETS 10*3/mm3 267 248         Results from last 7 days  Lab Units 04/05/17  1712   INR  1.28*        MEDICATIONS      atorvastatin 80 mg Oral Nightly   [START ON 4/7/2017] clopidogrel 75 mg Oral Daily   gabapentin 100 mg Oral Nightly   hydrALAZINE 25 mg Oral BID   insulin aspart 0-7 Units Subcutaneous 4x Daily AC & at Bedtime   lisinopril 20 mg Oral Q24H   metoprolol tartrate 25 mg Oral Q12H   pantoprazole 40 mg Oral Daily   piperacillin-tazobactam 4.5 g Intravenous Q6H   SITagliptin 50 mg Oral Daily   [START ON 4/7/2017] vancomycin 2,000 mg Intravenous Q24H       heparin (porcine) 1,000 Units/hr Last Rate: 1,200 Units/hr (04/06/17 3969)   Pharmacy to dose vancomycin       Prescriptions Prior to Admission   Medication Sig Dispense Refill  Last Dose   • diltiaZEM (TIAZAC) 360 MG 24 hr capsule TAKE 1 CAPSULE EVERY DAY 30 capsule 5 3/29/2017 at 1000   • FE BISGLY-FE [id number] PO Take  by mouth Daily.   3/28/2017 at 2200   • gabapentin (NEURONTIN) 100 MG capsule TAKE 1 CAPSULE AT BEDTIME FOR FOOT PAIN 30 capsule 5 3/28/2017 at 2200   • Glucosamine 500 MG capsule Take 500 mg by mouth 2 (Two) Times a Day.   2017 at Unknown time   • glyBURIDE (DIAbeta) 5 MG tablet TAKE 2 TABLETS TWICE DAILY BEFORE MEALS (Patient taking differently: TAKE 2 TABLETS BID) 120 tablet 5 3/28/2017 at 2200   • hydrALAZINE (APRESOLINE) 25 MG tablet Take 1 tablet by mouth 3 (Three) Times a Day. (Patient taking differently: Take 25 mg by mouth 2 (Two) Times a Day.) 90 tablet 3 3/29/2017 at 1000   • lisinopril (PRINIVIL,ZESTRIL) 20 MG tablet TAKE 1 TABLET BY MOUTH EVERY DAY FOR BLOOD PRESSURE 30 tablet 5 3/28/2017 at 1000   • nateglinide (STARLIX) 120 MG tablet TAKE 1 TABLET BY MOUTH BEFORE MEALS (Patient taking differently: daily) 90 tablet 3 3/28/2017 at 1000   • Omega-3 Fatty Acids (FISH OIL) 1000 MG capsule capsule Take 1,000 mg by mouth Daily With Breakfast.   3/28/2017 at 2200   • ONE TOUCH ULTRA TEST test strip USE THREE TIMES DAILY 100 each 11 Taking   • POLY-IRON 150 150 MG capsule TAKE 1 CAPSULE TWICE DAILY 60 capsule 3 3/28/2017 at 2200   • pravastatin (PRAVACHOL) 40 MG tablet Take 1 tablet by mouth Daily. 31 tablet 6 3/28/2017 at 1000   • promethazine (PHENERGAN) 12.5 MG tablet Take 1 tablet by mouth Every 6 (Six) Hours As Needed (prn). 30 tablet 1 More than a month at Unknown time   • SITagliptin (JANUVIA) 100 MG tablet Take 1 tablet by mouth Daily. (Patient taking differently: Take 50 mg by mouth Daily.) 30 tablet 5 3/28/2017 at 1000   • VITAMIN D, ERGOCALCIFEROL, PO Take  by mouth Daily.   3/28/2017 at 1000   • amoxicillin-clavulanate (AUGMENTIN) 875-125 MG per tablet Take 1 tablet by mouth 2 (Two) Times a Day for 10 days. 20 tablet 0    • []  doxycycline (VIBRAMYCIN) 100 MG capsule Take 1 capsule by mouth Daily for 7 days. 7 capsule 0    • HYDROcodone-acetaminophen (NORCO)  MG per tablet Take 1 tablet by mouth Every 6 (Six) Hours As Needed for Moderate Pain (4-6).      • omeprazole (priLOSEC) 40 MG capsule Take 40 mg by mouth Daily.   3/29/2017 at 1000   • oxyCODONE-acetaminophen (PERCOCET) 7.5-325 MG per tablet Take 1-2 tablets by mouth Every 4 (Four) Hours As Needed (Pain). 15 tablet 0    • pantoprazole (PROTONIX) 40 MG EC tablet Take 40 mg by mouth Daily.   not started     Assessment/Plan   ASSESSMENT / PLAN    Principal Problem:    NSTEMI (non-ST elevated myocardial infarction)  Active Problems:    Type 2 diabetes mellitus    Osteoarthritis of multiple joints    Essential hypertension    Bacterial pneumonia    Morbid obesity with BMI of 50.0-59.9, adult    Acute cystitis    1- brennan on ckd 3 most recent cr is clsoe to 2.0-2.1 but before it was <1. It is again upto 3.1 his recent brennan / ckd could be due to nsaids and bilateral ureteral stones and mild right hydronephrosis. He has now s/p right ureteral stent. i will get urology to review his case to see if he needs any intervention now. i will check urine na and add gentle ivf with hypotonic fluid to correct na. Hold lisinopril for now. Urine culture.     2- nstemi plan for medical management but will need invasive workup once cr improves.    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

## 2017-04-06 NOTE — PAYOR COMM NOTE
"Ingrid Harris (61 y.o. Male)     Date of Birth Social Security Number Address Home Phone MRN    [id number]  [id number] [facility] [address] [phone] [id number]    Nondenominational Marital Status          Oriental orthodox        Admission Date Admission Type Admitting Provider Attending Provider Department, Room/Bed    4/5/17 Urgent Hayley Valderrama MD Blair, Richard Earl, DO [address], 312/1    Discharge Date Discharge Disposition Discharge Destination                      Attending Provider: Javed Childers DO     Allergies:  No Known Allergies    Isolation:  None   Infection:  None   Code Status:  FULL    Ht:  73\" (185.4 cm)   Wt:  387 lb 3.2 oz (176 kg)    Admission Cmt:  None   Principal Problem:  NSTEMI (non-ST elevated myocardial infarction) [I21.4]                 Active Insurance as of 4/5/2017     Primary Coverage     Payor Plan Insurance Group Employer/Plan Group    [employer] EM[employer] [id number]     Payor Plan Address Payor Plan Phone Number Effective From Effective To    [address] [phone] 1/1/2015     [place] [address]       Subscriber Name Subscriber Birth Date Member ID       INGRID HARRIS 1955 [id number]                 Emergency Contacts      (Rel.) Home Phone Work Phone Mobile Phone    Christina Harris (Spouse) [phone] -- --          [facility]  [phone]     Phone  [phone]     Fax  Admit Inpatient  Ref. # [id number]       History & Physical      Hayley Valderrama MD at 4/5/2017  7:23 PM          NSTEMI (non-ST elevated myocardial infarction)  Subjective:     Ingrid Harris is a 61 y.o. male who presents for shortness of breath.  Patient recently underwent a cystoscopy with stent placement a week ago today with Dr. Lopez.  On Friday he developed chest pain that was described as a pressure type sensation.  His wife " states that he looked like he was having a stroke.  His blood pressure was 90s over 50s and he refused to come to the ER.  He was not speaking coherently.  He took some Xochitl-[place] and seemed to help his chest pain so he did not come in.  He has had left-sided pressure off and on as been really short of breath.  He's had dyspnea on exertion.  He has slept in a recliner for 8 years but denies any worsening of his breathing at night.  He's had some nausea but no vomiting.  No diaphoresis.  His weight has been steady but states he had lost down to 250s back in December. He is complaining of a lot of neck, hip, back, knee, and shoulder pain.  He denies any fever or chills.  He is supposed to have a colonoscopy next week with Dr. Green.  Any supposed to have another surgery with Dr. Lopez on Friday.      The following portions of the patient's history were reviewed and updated as appropriate: allergies, current medications, past family history, past medical history, past social history, past surgical history and problem list.    Concurrent Medical Hx:  Past Medical History:   Diagnosis Date   • Acute cystitis    • BPH (benign prostatic hypertrophy)    • Calculus of kidney and ureter     recently passed      • Chest pain    • Crohn's disease    • Edema     unspecified - bilateral lower extremities     • Essential hypertension    • Hip pain    • Hypertensive disorder    • Knee pain    • Morbid obesity    • Nausea    • Nuclear senile cataract    • Osteoarthritis of multiple joints    • Paraumbilical hernia    • Right hand fracture     as a teen   • Severe concentric left ventricular hypertrophy    • Type 2 diabetes mellitus      no BDR    • Type 2 diabetes mellitus      no BDR        Past Surgical Hx:  Past Surgical History:   Procedure Laterality Date   • COLONOSCOPY  ; 11/03/0216    Diverticulosis in sigmoid colon. 1 polyp in sigmoid colon; removed by cold biopsy polypectomy. Hemorrhoids found   • CYSTOSCOPY,  URETEROSCOPY, RETROGRADE PYELOGRAM, STENT INSERTION Bilateral 3/29/2017    Procedure: CYSTOSCOPY, BILATERAL RETROGRADE, URETEROSCOPY, LASER LITHOTRIPSY, STENT PLACEMENT; LASER LITHOTRIPSY BLADDER CALCULI;  Surgeon: Blake Lopez MD;  Location: [facility];  Service:    • HYDROCELE EXCISION / REPAIR     • INJECTION OF MEDICATION  04/18/2016    Kenalog   • UPPER GASTROINTESTINAL ENDOSCOPY  11/03/2016     Family Hx:  Family History   Problem Relation Age of Onset   • Cancer Other    • Diabetes Other    • Stroke Other    • Other Other      Colon problems    • Diabetes Mother    • Heart failure Mother    • Colon cancer Father    • Breast cancer Paternal Grandmother       Social History:  Social History     Social History   • Marital status:      Spouse name: N/A   • Number of children: N/A   • Years of education: N/A     Occupational History   • Not on file.     Social History Main Topics   • Smoking status: Never Smoker   • Smokeless tobacco: Never Used   • Alcohol use No   • Drug use: No   • Sexual activity: Defer     Other Topics Concern   • Not on file     Social History Narrative    Retired from the fire department.  Lives in [place] with wife and son.  Uses four pronged cane.       Home Medications:  No current facility-administered medications on file prior to encounter.      Current Outpatient Prescriptions on File Prior to Encounter   Medication Sig Dispense Refill   • diltiaZEM (TIAZAC) 360 MG 24 hr capsule TAKE 1 CAPSULE EVERY DAY 30 capsule 5   • FE BISGLY-FE [id number] PO Take  by mouth Daily.     • gabapentin (NEURONTIN) 100 MG capsule TAKE 1 CAPSULE AT BEDTIME FOR FOOT PAIN 30 capsule 5   • Glucosamine 500 MG capsule Take 500 mg by mouth 2 (Two) Times a Day.     • glyBURIDE (DIAbeta) 5 MG tablet TAKE 2 TABLETS TWICE DAILY BEFORE MEALS (Patient taking differently: TAKE 2 TABLETS BID) 120 tablet 5   • hydrALAZINE (APRESOLINE) 25 MG tablet Take 1 tablet by mouth 3 (Three) Times a Day.  (Patient taking differently: Take 25 mg by mouth 2 (Two) Times a Day.) 90 tablet 3   • lisinopril (PRINIVIL,ZESTRIL) 20 MG tablet TAKE 1 TABLET BY MOUTH EVERY DAY FOR BLOOD PRESSURE 30 tablet 5   • nateglinide (STARLIX) 120 MG tablet TAKE 1 TABLET BY MOUTH BEFORE MEALS (Patient taking differently: daily) 90 tablet 3   • Omega-3 Fatty Acids (FISH OIL) 1000 MG capsule capsule Take 1,000 mg by mouth Daily With Breakfast.     • ONE TOUCH ULTRA TEST test strip USE THREE TIMES DAILY 100 each 11   • POLY-IRON 150 150 MG capsule TAKE 1 CAPSULE TWICE DAILY 60 capsule 3   • pravastatin (PRAVACHOL) 40 MG tablet Take 1 tablet by mouth Daily. 31 tablet 6   • promethazine (PHENERGAN) 12.5 MG tablet Take 1 tablet by mouth Every 6 (Six) Hours As Needed (prn). 30 tablet 1   • SITagliptin (JANUVIA) 100 MG tablet Take 1 tablet by mouth Daily. (Patient taking differently: Take 50 mg by mouth Daily.) 30 tablet 5   • VITAMIN D, ERGOCALCIFEROL, PO Take  by mouth Daily.     • amoxicillin-clavulanate (AUGMENTIN) 875-125 MG per tablet Take 1 tablet by mouth 2 (Two) Times a Day for 10 days. 20 tablet 0   • doxycycline (VIBRAMYCIN) 100 MG capsule Take 1 capsule by mouth Daily for 7 days. 7 capsule 0   • HYDROcodone-acetaminophen (NORCO)  MG per tablet Take 1 tablet by mouth Every 6 (Six) Hours As Needed for Moderate Pain (4-6).     • omeprazole (priLOSEC) 40 MG capsule Take 40 mg by mouth Daily.     • oxyCODONE-acetaminophen (PERCOCET) 7.5-325 MG per tablet Take 1-2 tablets by mouth Every 4 (Four) Hours As Needed (Pain). 15 tablet 0   • pantoprazole (PROTONIX) 40 MG EC tablet Take 40 mg by mouth Daily.         Allergies:  Review of patient's allergies indicates no known allergies.  I reviewed the patient's new clinical results.  Review of Systems  Review of Systems   Constitutional: Positive for activity change, appetite change and fatigue. Negative for chills, diaphoresis, fever and unexpected weight change.   HENT: Positive for  congestion and sore throat. Negative for nosebleeds and trouble swallowing.         History has been irritated since the surgery and anesthesia   Respiratory: Positive for cough, chest tightness and shortness of breath. Negative for choking and wheezing.         His wife thought he had sleep apnea but he's never had a sleep study.  She states that since sleeping in the recliner his breathing is improved.   Cardiovascular: Positive for chest pain and leg swelling. Negative for palpitations.   Gastrointestinal: Positive for abdominal pain, anal bleeding, blood in stool and nausea. Negative for constipation and vomiting.   Genitourinary: Positive for dysuria and hematuria.   Musculoskeletal: Positive for arthralgias, back pain, gait problem, joint swelling, myalgias, neck pain and neck stiffness.   Skin: Negative for rash.   Neurological: Positive for weakness, numbness and headaches.   Hematological: Does not bruise/bleed easily.   Psychiatric/Behavioral: Positive for sleep disturbance. Negative for behavioral problems, confusion, decreased concentration, dysphoric mood, hallucinations, self-injury and suicidal ideas. The patient is not nervous/anxious and is not hyperactive.        Objective:     /61 (BP Location: Left arm, Patient Position: Sitting)  Pulse 95  Temp 97.5 °F (36.4 °C) (Temporal Artery )   Resp 22  Wt (!) 386 lb 6.4 oz (175 kg)  SpO2 93%  BMI 50.98 kg/m2  Physical Exam  I reviewed the patient's new clinical results.  Assessment/Plan:     Active Hospital Problems (** Indicates Principal Problem)    Diagnosis Date Noted   • **NSTEMI (non-ST elevated myocardial infarction) [I21.4] 04/05/2017   • Bacterial pneumonia [J15.9] 04/05/2017   • Morbid obesity with BMI of 50.0-59.9, adult [E66.01, Z68.43] 04/05/2017   • Essential hypertension [I10]    • Osteoarthritis of multiple joints [M15.9]    • Type 2 diabetes mellitus [E11.9]       no BDR         Resolved Hospital Problems    Diagnosis Date  Noted Date Resolved   No resolved problems to display.     Rayshawn [id number] reviewed, scanned, and appropriate.  Dr. Dobbins's been consultative for the non-STEMI, heparin drip started, echocardiogram ordered, and repeat troponins.  VQ scan for possible pulmonary emboli is pending.  Ultrasound bilateral lower extremity for possible DVTs.  Consult dietary for morbid obesity.  Consult Dr. Brand for acute on chronic renal failure.  Norco for pain.  Insulin sliding scale for his diabetes mellitus type 2.  Hypertension continue his current medications.  Possible bacterial pneumonia we will treat with Zosyn and vancomycin.  Blood cultures, sputum cultures, and urine culture pending.  Patient wishes to be a full code.          This document has been electronically signed by Hayley Valderrama MD on April 5, 2017 8:49 PM          This document has been electronically signed by Hayley Valderrama MD on April 5, 2017 8:49 PM          Electronically signed by Hayley Valderrama MD at 4/5/2017  8:56 PM        Emergency Department Notes     No notes of this type exist for this encounter.        Physician Progress Notes (last 24 hours) (Notes from 4/5/2017  8:57 AM through 4/6/2017  8:57 AM)     No notes of this type exist for this encounter.           Consult Notes (last 24 hours) (Notes from 4/5/2017  8:57 AM through 4/6/2017  8:57 AM)      Bharath Dobbins MD PhD at 4/6/2017  7:54 AM  Version 1 of 1     Consult Orders:    1. Inpatient Consult to Cardiology [[id number]] ordered by Hayley Valderrama MD at 04/05/17 2017                CARDIOVASCULAR CONSULTATION   Bharath Dobbins M.D., Ph.D., [facility]                Referring Provider: Hayley Valderrama MD    Reason for Consultation: Abnormal troponin      Subjective .     History of present illness:  This is a 61 male with obesity, HTN and HLD with a MPS in 12/2016 due to an abnormal EKG that had increased artifact There was  significant extracardiac uptake. Anterior wall had perfusion  "on the rest images which decreased with stress. Nonattenuated corrected images showed  adequate perfusion of  the anterior wall. Attenuated corrected images showed decreased perfusion during stress. However,  the nonattenuated corrected images show adequate perfusion to the septum. Quantitative analysis was concerning for the base of the inferior lateral wall with inducibility; however, nonattenuated corrected images show lateral wall is perfused. Because he was asymptomatic decision was made for medical management.     He had a recent ureteral stent placement and begin to have CP that was described as a \"pressure\" sensation. His BP was low to SBP in the 90s. He took Xochitl [place] with some CP relief. He has also had MARTINS and intermittent resting dyspnea. In the ED, he was found to have an abnormal troponin and his EKG was abnormal, but not acute. He was placed on UFH and ASA. A V/Q was obtained O/N that was low probability. He was also noted to be have acute on CKD. There was also concern for CAP, so he was cultured and placed on ABX. He has had no further CP.       Review of Systems - History obtained from chart review and the patient  General ROS: negative  Cardiovascular ROS: positive for - chest pain and dyspnea on exertion    History  Past Medical History:   Diagnosis Date   • Acute cystitis    • BPH (benign prostatic hypertrophy)    • Calculus of kidney and ureter     recently passed      • Chest pain    • Crohn's disease    • Edema     unspecified - bilateral lower extremities     • Essential hypertension    • Hip pain    • Hypertensive disorder    • Knee pain    • Morbid obesity    • Nausea    • Nuclear senile cataract    • Osteoarthritis of multiple joints    • Paraumbilical hernia    • Right hand fracture     as a teen   • Severe concentric left ventricular hypertrophy    • Type 2 diabetes mellitus      no BDR    • Type 2 diabetes mellitus      no BDR    ,   Past Surgical History:   Procedure Laterality " Date   • COLONOSCOPY  ; 11/03/0216    Diverticulosis in sigmoid colon. 1 polyp in sigmoid colon; removed by cold biopsy polypectomy. Hemorrhoids found   • CYSTOSCOPY, URETEROSCOPY, RETROGRADE PYELOGRAM, STENT INSERTION Bilateral 3/29/2017    Procedure: CYSTOSCOPY, BILATERAL RETROGRADE, URETEROSCOPY, LASER LITHOTRIPSY, STENT PLACEMENT; LASER LITHOTRIPSY BLADDER CALCULI;  Surgeon: Blake Lopez MD;  Location: [facility];  Service:    • HYDROCELE EXCISION / REPAIR     • INJECTION OF MEDICATION  04/18/2016    Kenalog   • UPPER GASTROINTESTINAL ENDOSCOPY  11/03/2016   ,   Family History   Problem Relation Age of Onset   • Cancer Other    • Diabetes Other    • Stroke Other    • Other Other      Colon problems    • Diabetes Mother    • Heart failure Mother    • Colon cancer Father    • Breast cancer Paternal Grandmother    ,   Social History   Substance Use Topics   • Smoking status: Never Smoker   • Smokeless tobacco: Never Used   • Alcohol use No   ,   Prescriptions Prior to Admission   Medication Sig Dispense Refill Last Dose   • diltiaZEM (TIAZAC) 360 MG 24 hr capsule TAKE 1 CAPSULE EVERY DAY 30 capsule 5 3/29/2017 at 1000   • FE BISGLY-FE [id number] PO Take  by mouth Daily.   3/28/2017 at 2200   • gabapentin (NEURONTIN) 100 MG capsule TAKE 1 CAPSULE AT BEDTIME FOR FOOT PAIN 30 capsule 5 3/28/2017 at 2200   • Glucosamine 500 MG capsule Take 500 mg by mouth 2 (Two) Times a Day.   4/5/2017 at Unknown time   • glyBURIDE (DIAbeta) 5 MG tablet TAKE 2 TABLETS TWICE DAILY BEFORE MEALS (Patient taking differently: TAKE 2 TABLETS BID) 120 tablet 5 3/28/2017 at 2200   • hydrALAZINE (APRESOLINE) 25 MG tablet Take 1 tablet by mouth 3 (Three) Times a Day. (Patient taking differently: Take 25 mg by mouth 2 (Two) Times a Day.) 90 tablet 3 3/29/2017 at 1000   • lisinopril (PRINIVIL,ZESTRIL) 20 MG tablet TAKE 1 TABLET BY MOUTH EVERY DAY FOR BLOOD PRESSURE 30 tablet 5 3/28/2017 at 1000   • nateglinide (STARLIX) 120 MG tablet  TAKE 1 TABLET BY MOUTH BEFORE MEALS (Patient taking differently: daily) 90 tablet 3 3/28/2017 at 1000   • Omega-3 Fatty Acids (FISH OIL) 1000 MG capsule capsule Take 1,000 mg by mouth Daily With Breakfast.   3/28/2017 at 2200   • ONE TOUCH ULTRA TEST test strip USE THREE TIMES DAILY 100 each 11 Taking   • POLY-IRON 150 150 MG capsule TAKE 1 CAPSULE TWICE DAILY 60 capsule 3 3/28/2017 at 2200   • pravastatin (PRAVACHOL) 40 MG tablet Take 1 tablet by mouth Daily. 31 tablet 6 3/28/2017 at 1000   • promethazine (PHENERGAN) 12.5 MG tablet Take 1 tablet by mouth Every 6 (Six) Hours As Needed (prn). 30 tablet 1 More than a month at Unknown time   • SITagliptin (JANUVIA) 100 MG tablet Take 1 tablet by mouth Daily. (Patient taking differently: Take 50 mg by mouth Daily.) 30 tablet 5 3/28/2017 at 1000   • VITAMIN D, ERGOCALCIFEROL, PO Take  by mouth Daily.   3/28/2017 at 1000   • amoxicillin-clavulanate (AUGMENTIN) 875-125 MG per tablet Take 1 tablet by mouth 2 (Two) Times a Day for 10 days. 20 tablet 0    • [] doxycycline (VIBRAMYCIN) 100 MG capsule Take 1 capsule by mouth Daily for 7 days. 7 capsule 0    • HYDROcodone-acetaminophen (NORCO)  MG per tablet Take 1 tablet by mouth Every 6 (Six) Hours As Needed for Moderate Pain (4-6).      • omeprazole (priLOSEC) 40 MG capsule Take 40 mg by mouth Daily.   3/29/2017 at 1000   • oxyCODONE-acetaminophen (PERCOCET) 7.5-325 MG per tablet Take 1-2 tablets by mouth Every 4 (Four) Hours As Needed (Pain). 15 tablet 0    • pantoprazole (PROTONIX) 40 MG EC tablet Take 40 mg by mouth Daily.   not started    and Allergies:  Review of patient's allergies indicates no known allergies.    Objective   Body mass index is 51.08 kg/(m^2).    Vital Sign Min/Max for last 24 hours  Temp  Min: 97.2 °F (36.2 °C)  Max: 97.9 °F (36.6 °C)   BP  Min: 121/61  Max: 157/83   Pulse  Min: 83  Max: 114   Resp  Min: 20  Max: 22   SpO2  Min: 90 %  Max: 94 %   Flow (L/min)  Min: 3  Max: 3   Weight  Min:  385 lb (175 kg)  Max: 387 lb 3.2 oz (176 kg)      Physical Exam:   GEN: alert, appears stated age and cooperative  Body Habitus: obese  HEENT: Head: Normocephalic, no lesions, without obvious abnormality.  Neck / Thyroid: Supple, no masses, nodes, nodules or enlargement. No arcus senilis, xanthelasma or xanthomas.   JVP: 7 cm of water at 45 degrees HJR: absent      Carotid:  Upstroke: easily palpated bilaterally Volume: Normal.    Carotid Bruit:  None  Subclavian Bruit: Not present.    Chest:  Normal Excursion: Good    I:E: Good  Pulmonary:clear to auscultation, no wheezes, rales or rhonchi, symmetric air entry      Precordium:  No palpable heaves or thrusts. P2 is not palpable.   [place]:  normal size and placement Palpable S4: Not present.   Heart rate: normal  Heart Rhythm: regular     Heart Sounds: S1: normal intensity  S2: normal intensity  S3: absent   S4: absent  Opening Snap: absent  A2-OS:  N/A  Pericardial rub: absent    Ejection click: None      Murmurs: Systolic: none  Diastolic: none  Abdomen: Soft, non-tender, normal bowel sounds; no bruits, organomegaly or masses.  Extremity: no edema, cyanosis  Trophic changes: None   Pallor on elevation: Absent.    Rubor on dependency: None  Pulses: Right radial artery has 2+ (normal) pulse and Left radial artery has 2+ (normal) pulse    EKG: Sinus with septal infarction.   Results Review:  Lab Results (last 24 hours)     Procedure Component Value Units Date/Time    Mycoplasma Pneumoniae PCR [[id number]] Collected:  04/05/17 1719    Specimen:  Sputum from Nasopharynx Updated:  04/05/17 1739    Blood Gas, Arterial [[id number]]  (Abnormal) Collected:  04/05/17 1725    Specimen:  Arterial Blood Updated:  04/05/17 1741     Site --      Not performed at this site.        Josias's Test --      Not performed at this site.        pH, Arterial 7.413 pH units      pCO2, Arterial 30.0 (L) mm Hg      pO2, Arterial 62.9 (L) mm Hg      HCO3, Arterial 18.7 (L) mmol/L      Base Excess,  Arterial -5.0 (L) mmol/L      O2 Saturation, Arterial 91.3 %      Hemoglobin, Blood Gas 10.3 (L) g/dL      Hematocrit, Blood Gas 30.0 (L) %      CO2 Content 19.6 (L)     Sodium, Arterial 145.5 mmol/L      Potassium, Arterial 4.24 mmol/L      Glucose, Arterial 203 mmol/L      Barometric Pressure for Blood Gas -- mmHg       Not performed at this site.        Modality --      Not performed at this site.        Ionized Calcium 4.9 mg/dL     CK Total & CKMB [[id number]]  (Normal) Collected:  04/05/17 1712    Specimen:  Blood Updated:  04/05/17 1815     CKMB 2.27 ng/mL      Creatine Kinase 67 U/L     Troponin [[id number]]  (Abnormal) Collected:  04/05/17 1712    Specimen:  Blood Updated:  04/05/17 1823     Troponin I 1.210 (C) ng/mL     Urine Culture [[id number]] Collected:  04/05/17 1832    Specimen:  Urine from Urine, Clean Catch Updated:  04/05/17 1836    Respiratory Culture [[id number]] Collected:  04/05/17 1724    Specimen:  Sputum from Unknown Updated:  04/05/17 1843     Respiratory Culture Rejected     Gram Stain Result Rare (1+) WBCs per low power field      Moderate (3+) Epithelial cells per low power field      Specimen rejected based upon microscopic exam of gram stain    Urinalysis With / Microscopic If Indicated [[id number]]  (Abnormal) Collected:  04/05/17 1832    Specimen:  Urine from Urine, Clean Catch Updated:  04/05/17 1852     Color, UA Yellow     Appearance, UA Turbid (A)     pH, UA 5.5     Specific Gravity, UA 1.017     Glucose, UA Negative     Ketones, UA Negative     Bilirubin, UA Negative     Blood, UA Moderate (2+) (A)     Protein, UA >=300 mg/dL (3+) (A)     Leuk Esterase, UA Large (3+) (A)     Nitrite, UA Negative     Urobilinogen, UA 0.2 E.U./dL    S. Pneumo Ag Urine or CSF [[id number]]  (Normal) Collected:  04/05/17 1832    Specimen:  Urine from Urine, Clean Catch Updated:  04/05/17 2002     Strep Pneumo Ag Negative    Legionella Antigen, Urine [[id number]]  (Normal) Collected:  04/05/17 1832    Specimen:   Urine from Urine, Clean Catch Updated:  04/05/17 2002     LEGIONELLA ANTIGEN, URINE Negative    Protime-INR [[id number]]  (Abnormal) Collected:  04/05/17 1712    Specimen:  Blood Updated:  04/05/17 2047     Protime 16.0 (H) Seconds      INR 1.28 (H)    Narrative:       Therapeutic range for most indications is 2.0-3.0 INR,  or 2.5-3.5 for mechanical heart valves.    aPTT [[id number]]  (Normal) Collected:  04/05/17 1712    Specimen:  Blood Updated:  04/05/17 2047     PTT 38.3 seconds     Narrative:       The recommended Heparin therapeutic range is 68-97 seconds.    Urinalysis, Microscopic Only [[id number]]  (Abnormal) Collected:  04/05/17 1832    Specimen:  Urine from Urine, Clean Catch Updated:  04/05/17 2116     RBC, UA 3-5 (A) /HPF      WBC, UA Too Numerous to Count (A) /HPF      Bacteria, UA 3+ (A) /HPF      Squamous Epithelial Cells, UA 3-5 (A) /HPF      Hyaline Casts, UA None Seen /LPF      Methodology Manual Light Microscopy    CK Total & CKMB [[id number]]  (Normal) Collected:  04/05/17 2051    Specimen:  Blood Updated:  04/05/17 2139     CKMB 2.47 ng/mL      Creatine Kinase 75 U/L     Troponin [[id number]]  (Abnormal) Collected:  04/05/17 2051    Specimen:  Blood Updated:  04/05/17 2141     Troponin I 1.280 (C) ng/mL     Extra Tubes [[id number]] Collected:  04/05/17 1712    Specimen:  Blood from Blood, Venous Line Updated:  04/05/17 2202    Narrative:       The following orders were created for panel order Extra Tubes.  Procedure                               Abnormality         Status                     ---------                               -----------         ------                     Light Blue Top[[id number]]                                    Final result               Lavender Top[[id number]]                                      Final result               Gold Top - SST[[id number]]                                    Final result                 Please view results for these tests on the individual orders.    Light  Blue Top [[id number]] Collected:  04/05/17 1712    Specimen:  Blood Updated:  04/05/17 2202     Extra Tube hold for add-on      Auto resulted       Lavender Top [[id number]] Collected:  04/05/17 1712    Specimen:  Blood Updated:  04/05/17 2202     Extra Tube hold for add-on      Auto resulted       Gold Top - SST [[id number]] Collected:  04/05/17 1712    Specimen:  Blood Updated:  04/05/17 2202     Extra Tube Hold for add-ons.      Auto resulted.       POC Glucose Fingerstick [[id number]]  (Abnormal) Collected:  04/05/17 2051    Specimen:  Blood Updated:  04/05/17 2353     Glucose 193 (H) mg/dL       RN NotifiedMeter: [id number]: [id number] CHESNEL LISSET       CK Total & CKMB [[id number]]  (Normal) Collected:  04/05/17 2319    Specimen:  Blood Updated:  04/06/17 0046     CKMB 2.45 ng/mL      Creatine Kinase 72 U/L     Troponin [[id number]]  (Abnormal) Collected:  04/05/17 2319    Specimen:  Blood Updated:  04/06/17 0050     Troponin I 1.220 (C) ng/mL     CBC (No Diff) [[id number]]  (Abnormal) Collected:  04/06/17 0245    Specimen:  Blood Updated:  04/06/17 0314     WBC 15.23 (H) 10*3/mm3      RBC 3.77 (L) 10*6/mm3      Hemoglobin 9.0 (L) g/dL      Hematocrit 28.5 (L) %      MCV 75.6 (L) fL      MCH 23.9 (L) pg      MCHC 31.6 g/dL      RDW 15.1 (H) %      RDW-SD 41.9 fl      MPV 8.4 fL      Platelets 267 10*3/mm3     Renal Function Panel [[id number]]  (Abnormal) Collected:  04/06/17 0245    Specimen:  Blood Updated:  04/06/17 0333     Glucose 196 (H) mg/dL      BUN 41 (H) mg/dL      Creatinine 3.10 (H) mg/dL      Sodium 148 (H) mmol/L      Potassium 4.4 mmol/L      Chloride 113 (H) mmol/L      CO2 18.0 (L) mmol/L      Calcium 9.3 mg/dL      Albumin 3.60 g/dL      Phosphorus 3.9 mg/dL      Anion Gap 17.0 (H) mmol/L      BUN/Creatinine Ratio 13.2     eGFR Non African Amer 20 (L) mL/min/1.73     CK Total & CKMB [[id number]]  (Normal) Collected:  04/06/17 0245    Specimen:  Blood Updated:  04/06/17 0346     CKMB 2.53 ng/mL       Creatine Kinase 73 U/L     Troponin [[id number]]  (Abnormal) Collected:  04/06/17 0245    Specimen:  Blood Updated:  04/06/17 0350     Troponin I 1.250 (C) ng/mL     aPTT [[id number]]  (Normal) Collected:  04/06/17 0245    Specimen:  Blood Updated:  04/06/17 0407     PTT 37.5 seconds     Narrative:       The recommended Heparin therapeutic range is 68-97 seconds.    Blood Culture [[id number]]  (Normal) Collected:  04/05/17 1712    Specimen:  Blood from Arm, Left Updated:  04/06/17 0601     Blood Culture No growth at less than 24 hours    Blood Culture [[id number]]  (Normal) Collected:  04/05/17 1702    Specimen:  Blood from Arm, Right Updated:  04/06/17 0601     Blood Culture No growth at less than 24 hours    POC Glucose Fingerstick [[id number]]  (Abnormal) Collected:  04/06/17 0623    Specimen:  Blood Updated:  04/06/17 0658     Glucose 182 (H) mg/dL       RN NotifiedMeter: [id number]: [id number] CHESNEL LISSET           Imaging Results (last 24 hours)     Procedure Component Value Units Date/Time    NM Lung Scan Perfusion Particulate [[id number]] Collected:  04/05/17 2044     Updated:  04/05/17 2048    Narrative:       Patient Name:  INGRID HARRIS  Patient ID:  [id number]   Ordering:  SHERON KELLER  Attending:  SHERON KELLER  Referring:  SHERON KELLER  ------------------------------------------------    Nuclear medicine perfusion lung scan    History: Shortness of breath. Elevated d-dimer.    Correlation: Chest radiograph 4/5/2017.    Following the intravenous administration of 6.6 millicuries of  technetium 99m MAA, multiple perfusion images obtained.    Some inhomogeneous distribution of radionuclide.  No peripheral wedge-shaped perfusion defect to indicate pulmonary  embolism.      Impression:       Conclusion:  Low probability for pulmonary embolism.    [id number]    Electronically signed by:  Gene Fu MD  4/5/2017 8:47 PM CDT  Workstation: [employer]. Study quality is adequate. Left  ventricle is normal.  2. Artifacts: The gated study is suboptimal. Anterior and  diaphragmatic attenuation.  3. SPECT imaging on the attenuated corrected images show perfusion to  the inferior wall both on the rest and stress images. There is  significant artifact because of extracardiac uptake. Anterior wall  has perfusion on the rest images which decreases with stress  however. Nonattenuated corrected images show adequate perfusion of  the anterior wall. The images seen on the attenuated corrected  images are likely secondary to artifact and consistent with  anterior attenuation. Numerous views were compared both on the  attenuated and nonattenuated images for the septum. Attenuated  corrected images showed decreased perfusion during stress. However,  the nonattenuated corrected images show adequate perfusion to the  septum. Quantitative analysis was concerning for the base of the  inferior lateral wall with inducibility; however, nonattenuated  corrected images show lateral wall is perfused. This likely is a  normal study with numerous areas of artifact.  4. Gated SPECT imaging of the left ventricle showed the ejection  fraction was 60% without evidence of T.I.D.. There was a septal  motion consistent with an incomplete right bundle branch block.    Assessment/Plan      1. ACS: NSTEMI. The patient is CP free. EKG is Abnormal, but not acute. He appears to have had a septal infarction prior to his arrival. Most recent troponin is abnormal, but downtrending. CORIN score: elevated. The patient is currently hemodynamically stable. Currently, the patient is not having active CP. I recommended a deferred invasive strategy. He is in [facility]. Contrast at this point with coronary angiography would likely worsen his renal function.   There are not active CI to full anticoagulation and antiplatelet therapy. The patient's renal function is Abnormal:. We can plan on full medical therapy at this time and plan for a deferred invasive  strategy pending nephrology evaluation and improving renal function unless the patient develops ADHF, electrical or hemodynamic instability, or refractory CP. Please contact me back if any of these develop so that we can consider a more urgent approach.  Please notify me of any changes in the patient's stability.   -ASA 81mg  -Plavix 600mg PO load followed by 75mg PO daily.   -heparin protocol  -Start high-dose Atorvastatin 80mg  -Start metoprolol  -TTE  -Deferred consideration for LHC after ABX therapy, nephrology evaluation and improving renal function        Thank you so much for allowing me to participate in the care of your patient.  If I can be of any further assistance, please do not hesitate contact me.          This document has been electronically signed by Bharath Dobbins MD PhD on April 6, 2017 7:55 AM             Electronically signed by Bharath Dobbins MD PhD at 4/6/2017  8:09 AM

## 2017-04-06 NOTE — CONSULTS
"CARDIOVASCULAR CONSULTATION   Bharath Dobbins M.D., Ph.D., [facility]                Referring Provider: Hayley Valderrama MD    Reason for Consultation: Abnormal troponin      Subjective .     History of present illness:  This is a 61 male with obesity, HTN and HLD with a MPS in 12/2016 due to an abnormal EKG that had increased artifact There was  significant extracardiac uptake. Anterior wall had perfusion on the rest images which decreased with stress. Nonattenuated corrected images showed  adequate perfusion of  the anterior wall. Attenuated corrected images showed decreased perfusion during stress. However,  the nonattenuated corrected images show adequate perfusion to the septum. Quantitative analysis was concerning for the base of the inferior lateral wall with inducibility; however, nonattenuated corrected images show lateral wall is perfused. Because he was asymptomatic decision was made for medical management.     He had a recent ureteral stent placement and begin to have CP that was described as a \"pressure\" sensation. His BP was low to SBP in the 90s. He took Xochitl [place] with some CP relief. He has also had MARTINS and intermittent resting dyspnea. In the ED, he was found to have an abnormal troponin and his EKG was abnormal, but not acute. He was placed on UFH and ASA. A V/Q was obtained O/N that was low probability. He was also noted to be have acute on CKD. There was also concern for CAP, so he was cultured and placed on ABX. He has had no further CP.       Review of Systems - History obtained from chart review and the patient  General ROS: negative  Cardiovascular ROS: positive for - chest pain and dyspnea on exertion    History  Past Medical History:   Diagnosis Date   • Acute cystitis    • BPH (benign prostatic hypertrophy)    • Calculus of kidney and ureter     recently passed      • Chest pain    • Crohn's disease    • Edema     unspecified - bilateral lower extremities     • Essential hypertension  "   • Hip pain    • Hypertensive disorder    • Knee pain    • Morbid obesity    • Nausea    • Nuclear senile cataract    • Osteoarthritis of multiple joints    • Paraumbilical hernia    • Right hand fracture     as a teen   • Severe concentric left ventricular hypertrophy    • Type 2 diabetes mellitus      no BDR    • Type 2 diabetes mellitus      no BDR    ,   Past Surgical History:   Procedure Laterality Date   • COLONOSCOPY  ; 11/03/0216    Diverticulosis in sigmoid colon. 1 polyp in sigmoid colon; removed by cold biopsy polypectomy. Hemorrhoids found   • CYSTOSCOPY, URETEROSCOPY, RETROGRADE PYELOGRAM, STENT INSERTION Bilateral 3/29/2017    Procedure: CYSTOSCOPY, BILATERAL RETROGRADE, URETEROSCOPY, LASER LITHOTRIPSY, STENT PLACEMENT; LASER LITHOTRIPSY BLADDER CALCULI;  Surgeon: Blake Lopez MD;  Location: [facility];  Service:    • HYDROCELE EXCISION / REPAIR     • INJECTION OF MEDICATION  04/18/2016    Kenalog   • UPPER GASTROINTESTINAL ENDOSCOPY  11/03/2016   ,   Family History   Problem Relation Age of Onset   • Cancer Other    • Diabetes Other    • Stroke Other    • Other Other      Colon problems    • Diabetes Mother    • Heart failure Mother    • Colon cancer Father    • Breast cancer Paternal Grandmother    ,   Social History   Substance Use Topics   • Smoking status: Never Smoker   • Smokeless tobacco: Never Used   • Alcohol use No   ,   Prescriptions Prior to Admission   Medication Sig Dispense Refill Last Dose   • diltiaZEM (TIAZAC) 360 MG 24 hr capsule TAKE 1 CAPSULE EVERY DAY 30 capsule 5 3/29/2017 at 1000   • FE BISGLY-FE [id number] PO Take  by mouth Daily.   3/28/2017 at 2200   • gabapentin (NEURONTIN) 100 MG capsule TAKE 1 CAPSULE AT BEDTIME FOR FOOT PAIN 30 capsule 5 3/28/2017 at 2200   • Glucosamine 500 MG capsule Take 500 mg by mouth 2 (Two) Times a Day.   4/5/2017 at Unknown time   • glyBURIDE (DIAbeta) 5 MG tablet TAKE 2 TABLETS TWICE DAILY BEFORE MEALS (Patient taking differently: TAKE  2 TABLETS BID) 120 tablet 5 3/28/2017 at 2200   • hydrALAZINE (APRESOLINE) 25 MG tablet Take 1 tablet by mouth 3 (Three) Times a Day. (Patient taking differently: Take 25 mg by mouth 2 (Two) Times a Day.) 90 tablet 3 3/29/2017 at 1000   • lisinopril (PRINIVIL,ZESTRIL) 20 MG tablet TAKE 1 TABLET BY MOUTH EVERY DAY FOR BLOOD PRESSURE 30 tablet 5 3/28/2017 at 1000   • nateglinide (STARLIX) 120 MG tablet TAKE 1 TABLET BY MOUTH BEFORE MEALS (Patient taking differently: daily) 90 tablet 3 3/28/2017 at 1000   • Omega-3 Fatty Acids (FISH OIL) 1000 MG capsule capsule Take 1,000 mg by mouth Daily With Breakfast.   3/28/2017 at 2200   • ONE TOUCH ULTRA TEST test strip USE THREE TIMES DAILY 100 each 11 Taking   • POLY-IRON 150 150 MG capsule TAKE 1 CAPSULE TWICE DAILY 60 capsule 3 3/28/2017 at 2200   • pravastatin (PRAVACHOL) 40 MG tablet Take 1 tablet by mouth Daily. 31 tablet 6 3/28/2017 at 1000   • promethazine (PHENERGAN) 12.5 MG tablet Take 1 tablet by mouth Every 6 (Six) Hours As Needed (prn). 30 tablet 1 More than a month at Unknown time   • SITagliptin (JANUVIA) 100 MG tablet Take 1 tablet by mouth Daily. (Patient taking differently: Take 50 mg by mouth Daily.) 30 tablet 5 3/28/2017 at 1000   • VITAMIN D, ERGOCALCIFEROL, PO Take  by mouth Daily.   3/28/2017 at 1000   • amoxicillin-clavulanate (AUGMENTIN) 875-125 MG per tablet Take 1 tablet by mouth 2 (Two) Times a Day for 10 days. 20 tablet 0    • [] doxycycline (VIBRAMYCIN) 100 MG capsule Take 1 capsule by mouth Daily for 7 days. 7 capsule 0    • HYDROcodone-acetaminophen (NORCO)  MG per tablet Take 1 tablet by mouth Every 6 (Six) Hours As Needed for Moderate Pain (4-6).      • omeprazole (priLOSEC) 40 MG capsule Take 40 mg by mouth Daily.   3/29/2017 at 1000   • oxyCODONE-acetaminophen (PERCOCET) 7.5-325 MG per tablet Take 1-2 tablets by mouth Every 4 (Four) Hours As Needed (Pain). 15 tablet 0    • pantoprazole (PROTONIX) 40 MG EC tablet Take 40 mg by  mouth Daily.   not started    and Allergies:  Review of patient's allergies indicates no known allergies.    Objective   Body mass index is 51.08 kg/(m^2).    Vital Sign Min/Max for last 24 hours  Temp  Min: 97.2 °F (36.2 °C)  Max: 97.9 °F (36.6 °C)   BP  Min: 121/61  Max: 157/83   Pulse  Min: 83  Max: 114   Resp  Min: 20  Max: 22   SpO2  Min: 90 %  Max: 94 %   Flow (L/min)  Min: 3  Max: 3   Weight  Min: 385 lb (175 kg)  Max: 387 lb 3.2 oz (176 kg)      Physical Exam:   GEN: alert, appears stated age and cooperative  Body Habitus: obese  HEENT: Head: Normocephalic, no lesions, without obvious abnormality.  Neck / Thyroid: Supple, no masses, nodes, nodules or enlargement. No arcus senilis, xanthelasma or xanthomas.   JVP: 7 cm of water at 45 degrees HJR: absent      Carotid:  Upstroke: easily palpated bilaterally Volume: Normal.    Carotid Bruit:  None  Subclavian Bruit: Not present.    Chest:  Normal Excursion: Good    I:E: Good  Pulmonary:clear to auscultation, no wheezes, rales or rhonchi, symmetric air entry      Precordium:  No palpable heaves or thrusts. P2 is not palpable.   [place]:  normal size and placement Palpable S4: Not present.   Heart rate: normal  Heart Rhythm: regular     Heart Sounds: S1: normal intensity  S2: normal intensity  S3: absent   S4: absent  Opening Snap: absent  A2-OS:  N/A  Pericardial rub: absent    Ejection click: None      Murmurs: Systolic: none  Diastolic: none  Abdomen: Soft, non-tender, normal bowel sounds; no bruits, organomegaly or masses.  Extremity: no edema, cyanosis  Trophic changes: None   Pallor on elevation: Absent.    Rubor on dependency: None  Pulses: Right radial artery has 2+ (normal) pulse and Left radial artery has 2+ (normal) pulse    EKG: Sinus with septal infarction.   Results Review:  Lab Results (last 24 hours)     Procedure Component Value Units Date/Time    Mycoplasma Pneumoniae PCR [[id number]] Collected:  04/05/17 8812    Specimen:  Sputum from Nasopharynx  Updated:  04/05/17 1739    Blood Gas, Arterial [[id number]]  (Abnormal) Collected:  04/05/17 1725    Specimen:  Arterial Blood Updated:  04/05/17 1741     Site --      Not performed at this site.        Josias's Test --      Not performed at this site.        pH, Arterial 7.413 pH units      pCO2, Arterial 30.0 (L) mm Hg      pO2, Arterial 62.9 (L) mm Hg      HCO3, Arterial 18.7 (L) mmol/L      Base Excess, Arterial -5.0 (L) mmol/L      O2 Saturation, Arterial 91.3 %      Hemoglobin, Blood Gas 10.3 (L) g/dL      Hematocrit, Blood Gas 30.0 (L) %      CO2 Content 19.6 (L)     Sodium, Arterial 145.5 mmol/L      Potassium, Arterial 4.24 mmol/L      Glucose, Arterial 203 mmol/L      Barometric Pressure for Blood Gas -- mmHg       Not performed at this site.        Modality --      Not performed at this site.        Ionized Calcium 4.9 mg/dL     CK Total & CKMB [[id number]]  (Normal) Collected:  04/05/17 1712    Specimen:  Blood Updated:  04/05/17 1815     CKMB 2.27 ng/mL      Creatine Kinase 67 U/L     Troponin [[id number]]  (Abnormal) Collected:  04/05/17 1712    Specimen:  Blood Updated:  04/05/17 1823     Troponin I 1.210 (C) ng/mL     Urine Culture [[id number]] Collected:  04/05/17 1832    Specimen:  Urine from Urine, Clean Catch Updated:  04/05/17 1836    Respiratory Culture [[id number]] Collected:  04/05/17 1724    Specimen:  Sputum from Unknown Updated:  04/05/17 1843     Respiratory Culture Rejected     Gram Stain Result Rare (1+) WBCs per low power field      Moderate (3+) Epithelial cells per low power field      Specimen rejected based upon microscopic exam of gram stain    Urinalysis With / Microscopic If Indicated [[id number]]  (Abnormal) Collected:  04/05/17 1832    Specimen:  Urine from Urine, Clean Catch Updated:  04/05/17 1852     Color, UA Yellow     Appearance, UA Turbid (A)     pH, UA 5.5     Specific Gravity, UA 1.017     Glucose, UA Negative     Ketones, UA Negative     Bilirubin, UA Negative     Blood, UA  Moderate (2+) (A)     Protein, UA >=300 mg/dL (3+) (A)     Leuk Esterase, UA Large (3+) (A)     Nitrite, UA Negative     Urobilinogen, UA 0.2 E.U./dL    S. Pneumo Ag Urine or CSF [[id number]]  (Normal) Collected:  04/05/17 1832    Specimen:  Urine from Urine, Clean Catch Updated:  04/05/17 2002     Strep Pneumo Ag Negative    Legionella Antigen, Urine [[id number]]  (Normal) Collected:  04/05/17 1832    Specimen:  Urine from Urine, Clean Catch Updated:  04/05/17 2002     LEGIONELLA ANTIGEN, URINE Negative    Protime-INR [[id number]]  (Abnormal) Collected:  04/05/17 1712    Specimen:  Blood Updated:  04/05/17 2047     Protime 16.0 (H) Seconds      INR 1.28 (H)    Narrative:       Therapeutic range for most indications is 2.0-3.0 INR,  or 2.5-3.5 for mechanical heart valves.    aPTT [[id number]]  (Normal) Collected:  04/05/17 1712    Specimen:  Blood Updated:  04/05/17 2047     PTT 38.3 seconds     Narrative:       The recommended Heparin therapeutic range is 68-97 seconds.    Urinalysis, Microscopic Only [[id number]]  (Abnormal) Collected:  04/05/17 1832    Specimen:  Urine from Urine, Clean Catch Updated:  04/05/17 2116     RBC, UA 3-5 (A) /HPF      WBC, UA Too Numerous to Count (A) /HPF      Bacteria, UA 3+ (A) /HPF      Squamous Epithelial Cells, UA 3-5 (A) /HPF      Hyaline Casts, UA None Seen /LPF      Methodology Manual Light Microscopy    CK Total & CKMB [[id number]]  (Normal) Collected:  04/05/17 2051    Specimen:  Blood Updated:  04/05/17 2139     CKMB 2.47 ng/mL      Creatine Kinase 75 U/L     Troponin [[id number]]  (Abnormal) Collected:  04/05/17 2051    Specimen:  Blood Updated:  04/05/17 2141     Troponin I 1.280 (C) ng/mL     Extra Tubes [[id number]] Collected:  04/05/17 1712    Specimen:  Blood from Blood, Venous Line Updated:  04/05/17 2202    Narrative:       The following orders were created for panel order Extra Tubes.  Procedure                               Abnormality         Status                     ---------                                -----------         ------                     Light Blue Top[[id number]]                                    Final result               Lavender Top[[id number]]                                      Final result               Gold Top - SST[[id number]]                                    Final result                 Please view results for these tests on the individual orders.    Light Blue Top [[id number]] Collected:  04/05/17 1712    Specimen:  Blood Updated:  04/05/17 2202     Extra Tube hold for add-on      Auto resulted       Lavender Top [[id number]] Collected:  04/05/17 1712    Specimen:  Blood Updated:  04/05/17 2202     Extra Tube hold for add-on      Auto resulted       Gold Top - SST [[id number]] Collected:  04/05/17 1712    Specimen:  Blood Updated:  04/05/17 2202     Extra Tube Hold for add-ons.      Auto resulted.       POC Glucose Fingerstick [[id number]]  (Abnormal) Collected:  04/05/17 2051    Specimen:  Blood Updated:  04/05/17 2353     Glucose 193 (H) mg/dL       RN NotifiedMeter: [id number]: [id number] CHESNEL LISSET       CK Total & CKMB [[id number]]  (Normal) Collected:  04/05/17 2319    Specimen:  Blood Updated:  04/06/17 0046     CKMB 2.45 ng/mL      Creatine Kinase 72 U/L     Troponin [[id number]]  (Abnormal) Collected:  04/05/17 2319    Specimen:  Blood Updated:  04/06/17 0050     Troponin I 1.220 (C) ng/mL     CBC (No Diff) [[id number]]  (Abnormal) Collected:  04/06/17 0245    Specimen:  Blood Updated:  04/06/17 0314     WBC 15.23 (H) 10*3/mm3      RBC 3.77 (L) 10*6/mm3      Hemoglobin 9.0 (L) g/dL      Hematocrit 28.5 (L) %      MCV 75.6 (L) fL      MCH 23.9 (L) pg      MCHC 31.6 g/dL      RDW 15.1 (H) %      RDW-SD 41.9 fl      MPV 8.4 fL      Platelets 267 10*3/mm3     Renal Function Panel [[id number]]  (Abnormal) Collected:  04/06/17 0245    Specimen:  Blood Updated:  04/06/17 0333     Glucose 196 (H) mg/dL      BUN 41 (H) mg/dL      Creatinine 3.10 (H) mg/dL       Sodium 148 (H) mmol/L      Potassium 4.4 mmol/L      Chloride 113 (H) mmol/L      CO2 18.0 (L) mmol/L      Calcium 9.3 mg/dL      Albumin 3.60 g/dL      Phosphorus 3.9 mg/dL      Anion Gap 17.0 (H) mmol/L      BUN/Creatinine Ratio 13.2     eGFR Non African Amer 20 (L) mL/min/1.73     CK Total & CKMB [[id number]]  (Normal) Collected:  04/06/17 0245    Specimen:  Blood Updated:  04/06/17 0346     CKMB 2.53 ng/mL      Creatine Kinase 73 U/L     Troponin [[id number]]  (Abnormal) Collected:  04/06/17 0245    Specimen:  Blood Updated:  04/06/17 0350     Troponin I 1.250 (C) ng/mL     aPTT [[id number]]  (Normal) Collected:  04/06/17 0245    Specimen:  Blood Updated:  04/06/17 0407     PTT 37.5 seconds     Narrative:       The recommended Heparin therapeutic range is 68-97 seconds.    Blood Culture [[id number]]  (Normal) Collected:  04/05/17 1712    Specimen:  Blood from Arm, Left Updated:  04/06/17 0601     Blood Culture No growth at less than 24 hours    Blood Culture [[id number]]  (Normal) Collected:  04/05/17 1702    Specimen:  Blood from Arm, Right Updated:  04/06/17 0601     Blood Culture No growth at less than 24 hours    POC Glucose Fingerstick [[id number]]  (Abnormal) Collected:  04/06/17 0623    Specimen:  Blood Updated:  04/06/17 0658     Glucose 182 (H) mg/dL       RN NotifiedMeter: [id number]: [id number] CHESNEL LISSET           Imaging Results (last 24 hours)     Procedure Component Value Units Date/Time    NM Lung Scan Perfusion Particulate [[id number]] Collected:  04/05/17 2044     Updated:  04/05/17 2048    Narrative:       Patient Name:  INGRID HARRIS  Patient ID:  [id number]   Ordering:  SHERON KELLER  Attending:  SHERON KELLER  Referring:  SHERON KELLER  ------------------------------------------------    Nuclear medicine perfusion lung scan    History: Shortness of breath. Elevated d-dimer.    Correlation: Chest radiograph 4/5/2017.    Following the intravenous administration of 6.6 millicuries  of  technetium 99m MAA, multiple perfusion images obtained.    Some inhomogeneous distribution of radionuclide.  No peripheral wedge-shaped perfusion defect to indicate pulmonary  embolism.      Impression:       Conclusion:  Low probability for pulmonary embolism.    [id number]    Electronically signed by:  Gene Fu MD  4/5/2017 8:47 PM CDT  Workstation: [employer]        1. Study quality is adequate. Left ventricle is normal.  2. Artifacts: The gated study is suboptimal. Anterior and  diaphragmatic attenuation.  3. SPECT imaging on the attenuated corrected images show perfusion to  the inferior wall both on the rest and stress images. There is  significant artifact because of extracardiac uptake. Anterior wall  has perfusion on the rest images which decreases with stress  however. Nonattenuated corrected images show adequate perfusion of  the anterior wall. The images seen on the attenuated corrected  images are likely secondary to artifact and consistent with  anterior attenuation. Numerous views were compared both on the  attenuated and nonattenuated images for the septum. Attenuated  corrected images showed decreased perfusion during stress. However,  the nonattenuated corrected images show adequate perfusion to the  septum. Quantitative analysis was concerning for the base of the  inferior lateral wall with inducibility; however, nonattenuated  corrected images show lateral wall is perfused. This likely is a  normal study with numerous areas of artifact.  4. Gated SPECT imaging of the left ventricle showed the ejection  fraction was 60% without evidence of T.I.D.. There was a septal  motion consistent with an incomplete right bundle branch block.    Assessment/Plan      1. ACS: NSTEMI. The patient is CP free. EKG is Abnormal, but not acute. He appears to have had a septal infarction prior to his arrival. Most recent troponin is abnormal, but downtrending. CORIN score: elevated. The patient is currently  hemodynamically stable. Currently, the patient is not having active CP. I recommended a deferred invasive strategy. He is in BRANDO. Contrast at this point with coronary angiography would likely worsen his renal function.   There are not active CI to full anticoagulation and antiplatelet therapy. The patient's renal function is Abnormal:. We can plan on full medical therapy at this time and plan for a deferred invasive strategy pending nephrology evaluation and improving renal function unless the patient develops ADHF, electrical or hemodynamic instability, or refractory CP. Please contact me back if any of these develop so that we can consider a more urgent approach.  Please notify me of any changes in the patient's stability.   -ASA 81mg  -Plavix 600mg PO load followed by 75mg PO daily.   -heparin protocol  -Start high-dose Atorvastatin 80mg  -Start metoprolol  -TTE  -Deferred consideration for LHC after ABX therapy, nephrology evaluation and improving renal function        Thank you so much for allowing me to participate in the care of your patient.  If I can be of any further assistance, please do not hesitate contact me.          This document has been electronically signed by Bharath Dobbins MD PhD on April 6, 2017 7:55 AM

## 2017-04-06 NOTE — NURSING NOTE
"Patient has  shearing injury right buttock with excoriation. Wound measures 1.2 x 1 x0.1 cm with uneven rolled edges. Pt states  \"I sleep in a recliner.\" He has a growth on his left posterior buttock that he has seen Dr Sheldon for (11-18-17). Pt states that Dr Sheldon had planned to remove the growth but due to other health issues he has not. Needs off loading, protection and good skin care. POA yes .  "

## 2017-04-06 NOTE — CONSULTS
"Adult Nutrition  Assessment    Patient Name:  Blake Chavez  YOB: 1955  MRN: [id number]  Admit Date:  4/5/2017    Assessment Date:  4/6/2017          Reason for Assessment       04/06/17 1541    Reason for Assessment    Reason For Assessment/Visit identified at risk by screening criteria;education    Identified At Risk By Screening Criteria MST SCORE 2+;unintentional loss of 10 lbs or more in the past 2 mos;BMI                  Anthropometrics       04/06/17 1542    Anthropometrics    Height 185.4 cm (72.99\")    Weight (!)  176 kg (388 lb 0.2 oz)    Ideal Body Weight (IBW)    Ideal Body Weight (IBW), Male (kg) 84.84    % Ideal Body Weight 207.88    Body Mass Index (BMI)    BMI (kg/m2) 51.31    BMI Grade greater than 40 - obesity grade III            Labs/Tests/Procedures/Meds       04/06/17 1543    Labs/Tests/Procedures/Meds    Labs/Tests Review Glucose;Hgb Hct;Creat;BUN;Na+    Medication Review Insulin;Diabetic Oral Agent;Reviewed, pertinent              Estimated/Assessed Needs       04/06/17 1544    Calculation Measurements    Weight Used For Calculations 107 kg (235 lb 7.2 oz)    Height Used for Calculations 1.854 m (6' 0.99\")    Estimated/Assessed Energy Needs    Energy Need Method Surry-St Jeor    Age 61    RMR (Surry-St. Jeor Equation) 1916.75    Activity Factors (Surry St Jeor)  Out of bed, ambulatory  1.3    Total estimated needs (Surry St. Jeor) 2490    Estimated/Assessed Protein Needs    Weight Used for Protein Calculation 107 kg (235 lb 7.2 oz)    Estimated Protein Range 106 - 128    Estimated/Assessed Fluid Needs    Fluid Need Method --   3195            Nutrition Prescription Ordered       04/06/17 1550    Nutrition Prescription PO    Current PO Diet Regular    Common Modifiers Cardiac;Consistent Carbohydrate;Renal              Comments:  Pt reports fair appetite.  Voiced beverage preferences.  Reports weighing in the 350's during 12/16.  Indicates wt gain is due to " increased appetite.  Reports some nausea.  Protonix prescribed.  Blood glucose is elevated.  Januvia, sliding scale novolog prescribed.  BMI 51 with pt at 2075 IBW.  Making Lifestyle Changes for a Healthier Weight and Heart Healthy diet info used to provide instruction regarding Wt Loss Heart Health diet and diet copies given.         Electronically signed by:  Christina Morrell RD  04/06/17 3:51 PM

## 2017-04-06 NOTE — PROGRESS NOTES
LOS: 1 day   Patient Care Team:  Himanshu Tovar MD as PCP - General (Family Medicine)    Chief Complaint: NSTEMI (non-ST elevated myocardial infarction)    Subjective      C/O dyspnea. No chest pain.    Interval History:     Patient Complaints: dyspnea  Patient Denies:  Chest pain  History taken from: patient and chart    Review of Systems:    Review of Systems   Constitutional: Positive for fatigue. Negative for activity change, appetite change and fever.   HENT: Negative for ear pain and sore throat.    Eyes: Negative for pain and visual disturbance.   Respiratory: Positive for shortness of breath. Negative for cough.    Cardiovascular: Negative for chest pain and palpitations.   Gastrointestinal: Negative for abdominal pain and nausea.   Endocrine: Negative for cold intolerance and heat intolerance.   Genitourinary: Negative for difficulty urinating and dysuria.   Musculoskeletal: Negative for arthralgias and gait problem.   Skin: Negative for color change and rash.   Neurological: Negative for dizziness, weakness and headaches.   Hematological: Negative for adenopathy. Does not bruise/bleed easily.   Psychiatric/Behavioral: Negative for agitation, confusion and sleep disturbance.       Objective     Vital Signs  Temp:  [97.2 °F (36.2 °C)-97.7 °F (36.5 °C)] 97.2 °F (36.2 °C)  Heart Rate:  [] 94  Resp:  [20-22] 20  BP: (121-157)/(61-83) 157/83    Physical Exam:   Physical Exam   Constitutional: He is oriented to person, place, and time. Vital signs are normal. He appears well-developed and well-nourished.  Non-toxic appearance. He does not have a sickly appearance. He does not appear ill. No distress.   Morbidly obese white male   HENT:   Head: Normocephalic and atraumatic.   Eyes: Conjunctivae and EOM are normal. Pupils are equal, round, and reactive to light. Right eye exhibits no discharge. Left eye exhibits no discharge. No scleral icterus.   Neck: Normal range of motion. Neck supple. No JVD  present. No tracheal deviation present. No thyromegaly present.   Cardiovascular: Normal rate, regular rhythm, normal heart sounds and intact distal pulses.  Exam reveals no gallop and no friction rub.    No murmur heard.  Pedal edema    Pulmonary/Chest: Effort normal. No stridor. No respiratory distress. He has decreased breath sounds in the right upper field, the right middle field, the right lower field, the left upper field and the left middle field. He has no wheezes. He has no rhonchi. He has no rales. He exhibits no tenderness.   Abdominal: Soft. Bowel sounds are normal. He exhibits no distension and no mass. There is no tenderness. There is no rebound and no guarding. No hernia.   Musculoskeletal: Normal range of motion. He exhibits no edema or deformity.   Lymphadenopathy:     He has no cervical adenopathy.   Neurological: He is alert and oriented to person, place, and time. He exhibits normal muscle tone. Coordination normal.   Skin: Skin is warm and dry. No erythema.   Psychiatric: He has a normal mood and affect. His behavior is normal. Judgment and thought content normal.   Nursing note and vitals reviewed.       Results Review:       Lab Results (last 24 hours)     Procedure Component Value Units Date/Time    Blood Gas, Arterial [[id number]]  (Abnormal) Collected:  04/05/17 1725    Specimen:  Arterial Blood Updated:  04/05/17 1741     Site --      Not performed at this site.        Josias's Test --      Not performed at this site.        pH, Arterial 7.413 pH units      pCO2, Arterial 30.0 (L) mm Hg      pO2, Arterial 62.9 (L) mm Hg      HCO3, Arterial 18.7 (L) mmol/L      Base Excess, Arterial -5.0 (L) mmol/L      O2 Saturation, Arterial 91.3 %      Hemoglobin, Blood Gas 10.3 (L) g/dL      Hematocrit, Blood Gas 30.0 (L) %      CO2 Content 19.6 (L)     Sodium, Arterial 145.5 mmol/L      Potassium, Arterial 4.24 mmol/L      Glucose, Arterial 203 mmol/L      Barometric Pressure for Blood Gas -- mmHg        Not performed at this site.        Modality --      Not performed at this site.        Ionized Calcium 4.9 mg/dL     CK Total & CKMB [[id number]]  (Normal) Collected:  04/05/17 1712    Specimen:  Blood Updated:  04/05/17 1815     CKMB 2.27 ng/mL      Creatine Kinase 67 U/L     Troponin [[id number]]  (Abnormal) Collected:  04/05/17 1712    Specimen:  Blood Updated:  04/05/17 1823     Troponin I 1.210 (C) ng/mL     Urine Culture [[id number]] Collected:  04/05/17 1832    Specimen:  Urine from Urine, Clean Catch Updated:  04/05/17 1836    Respiratory Culture [[id number]] Collected:  04/05/17 1724    Specimen:  Sputum from Unknown Updated:  04/05/17 1843     Respiratory Culture Rejected     Gram Stain Result Rare (1+) WBCs per low power field      Moderate (3+) Epithelial cells per low power field      Specimen rejected based upon microscopic exam of gram stain    Urinalysis With / Microscopic If Indicated [[id number]]  (Abnormal) Collected:  04/05/17 1832    Specimen:  Urine from Urine, Clean Catch Updated:  04/05/17 1852     Color, UA Yellow     Appearance, UA Turbid (A)     pH, UA 5.5     Specific Gravity, UA 1.017     Glucose, UA Negative     Ketones, UA Negative     Bilirubin, UA Negative     Blood, UA Moderate (2+) (A)     Protein, UA >=300 mg/dL (3+) (A)     Leuk Esterase, UA Large (3+) (A)     Nitrite, UA Negative     Urobilinogen, UA 0.2 E.U./dL    S. Pneumo Ag Urine or CSF [[id number]]  (Normal) Collected:  04/05/17 1832    Specimen:  Urine from Urine, Clean Catch Updated:  04/05/17 2002     Strep Pneumo Ag Negative    Legionella Antigen, Urine [[id number]]  (Normal) Collected:  04/05/17 1832    Specimen:  Urine from Urine, Clean Catch Updated:  04/05/17 2002     LEGIONELLA ANTIGEN, URINE Negative    Protime-INR [[id number]]  (Abnormal) Collected:  04/05/17 1712    Specimen:  Blood Updated:  04/05/17 2047     Protime 16.0 (H) Seconds      INR 1.28 (H)    Narrative:       Therapeutic range for most indications is 2.0-3.0  INR,  or 2.5-3.5 for mechanical heart valves.    aPTT [[id number]]  (Normal) Collected:  04/05/17 1712    Specimen:  Blood Updated:  04/05/17 2047     PTT 38.3 seconds     Narrative:       The recommended Heparin therapeutic range is 68-97 seconds.    Urinalysis, Microscopic Only [[id number]]  (Abnormal) Collected:  04/05/17 1832    Specimen:  Urine from Urine, Clean Catch Updated:  04/05/17 2116     RBC, UA 3-5 (A) /HPF      WBC, UA Too Numerous to Count (A) /HPF      Bacteria, UA 3+ (A) /HPF      Squamous Epithelial Cells, UA 3-5 (A) /HPF      Hyaline Casts, UA None Seen /LPF      Methodology Manual Light Microscopy    CK Total & CKMB [[id number]]  (Normal) Collected:  04/05/17 2051    Specimen:  Blood Updated:  04/05/17 2139     CKMB 2.47 ng/mL      Creatine Kinase 75 U/L     Troponin [[id number]]  (Abnormal) Collected:  04/05/17 2051    Specimen:  Blood Updated:  04/05/17 2141     Troponin I 1.280 (C) ng/mL     Extra Tubes [[id number]] Collected:  04/05/17 1712    Specimen:  Blood from Blood, Venous Line Updated:  04/05/17 2202    Narrative:       The following orders were created for panel order Extra Tubes.  Procedure                               Abnormality         Status                     ---------                               -----------         ------                     Light Blue Top[[id number]]                                    Final result               Lavender Top[[id number]]                                      Final result               Gold Top - SST[[id number]]                                    Final result                 Please view results for these tests on the individual orders.    Light Blue Top [[id number]] Collected:  04/05/17 1712    Specimen:  Blood Updated:  04/05/17 2202     Extra Tube hold for add-on      Auto resulted       Lavender Top [[id number]] Collected:  04/05/17 1712    Specimen:  Blood Updated:  04/05/17 2202     Extra Tube hold for add-on      Auto resulted       Gold Top - SST  [[id number]] Collected:  04/05/17 1712    Specimen:  Blood Updated:  04/05/17 2202     Extra Tube Hold for add-ons.      Auto resulted.       POC Glucose Fingerstick [[id number]]  (Abnormal) Collected:  04/05/17 2051    Specimen:  Blood Updated:  04/05/17 2353     Glucose 193 (H) mg/dL       RN NotifiedMeter: [id number]: [id number] CHESNEL LISSET       CK Total & CKMB [[id number]]  (Normal) Collected:  04/05/17 2319    Specimen:  Blood Updated:  04/06/17 0046     CKMB 2.45 ng/mL      Creatine Kinase 72 U/L     Troponin [[id number]]  (Abnormal) Collected:  04/05/17 2319    Specimen:  Blood Updated:  04/06/17 0050     Troponin I 1.220 (C) ng/mL     CBC (No Diff) [[id number]]  (Abnormal) Collected:  04/06/17 0245    Specimen:  Blood Updated:  04/06/17 0314     WBC 15.23 (H) 10*3/mm3      RBC 3.77 (L) 10*6/mm3      Hemoglobin 9.0 (L) g/dL      Hematocrit 28.5 (L) %      MCV 75.6 (L) fL      MCH 23.9 (L) pg      MCHC 31.6 g/dL      RDW 15.1 (H) %      RDW-SD 41.9 fl      MPV 8.4 fL      Platelets 267 10*3/mm3     Renal Function Panel [[id number]]  (Abnormal) Collected:  04/06/17 0245    Specimen:  Blood Updated:  04/06/17 0333     Glucose 196 (H) mg/dL      BUN 41 (H) mg/dL      Creatinine 3.10 (H) mg/dL      Sodium 148 (H) mmol/L      Potassium 4.4 mmol/L      Chloride 113 (H) mmol/L      CO2 18.0 (L) mmol/L      Calcium 9.3 mg/dL      Albumin 3.60 g/dL      Phosphorus 3.9 mg/dL      Anion Gap 17.0 (H) mmol/L      BUN/Creatinine Ratio 13.2     eGFR Non African Amer 20 (L) mL/min/1.73     CK Total & CKMB [[id number]]  (Normal) Collected:  04/06/17 0245    Specimen:  Blood Updated:  04/06/17 0346     CKMB 2.53 ng/mL      Creatine Kinase 73 U/L     Troponin [[id number]]  (Abnormal) Collected:  04/06/17 0245    Specimen:  Blood Updated:  04/06/17 0350     Troponin I 1.250 (C) ng/mL     aPTT [[id number]]  (Normal) Collected:  04/06/17 0245    Specimen:  Blood Updated:  04/06/17 0407     PTT 37.5 seconds     Narrative:       The  recommended Heparin therapeutic range is 68-97 seconds.    Blood Culture [[id number]]  (Normal) Collected:  04/05/17 1712    Specimen:  Blood from Arm, Left Updated:  04/06/17 0601     Blood Culture No growth at less than 24 hours    Blood Culture [[id number]]  (Normal) Collected:  04/05/17 1702    Specimen:  Blood from Arm, Right Updated:  04/06/17 0601     Blood Culture No growth at less than 24 hours    POC Glucose Fingerstick [[id number]]  (Abnormal) Collected:  04/06/17 0623    Specimen:  Blood Updated:  04/06/17 0658     Glucose 182 (H) mg/dL       RN NotifiedMeter: [id number]: [id number] CHESNEL LISSET       aPTT [[id number]]  (Normal) Collected:  04/06/17 0806    Specimen:  Blood Updated:  04/06/17 0852     PTT 39.2 seconds     Narrative:       The recommended Heparin therapeutic range is 68-97 seconds.    Mycoplasma Pneumoniae PCR [[id number]] Collected:  04/05/17 1719    Specimen:  Sputum from Nasopharynx Updated:  04/06/17 0916     MYCOPLASMAE PNEUMONIAE BY PCR Negative    Narrative:       This test is performed by utilizing real time polymerace chain recation (PCR).   This test is performed by utilizing real time polymerace chain recation (PCR).         Patient Name: INGRID HARRIS  Patient ID: [id number]   Ordering: SHERON KELLER  Attending: SHERON KELLER  Referring: SHERON KELLER  ------------------------------------------------     Nuclear medicine perfusion lung scan     History: Shortness of breath. Elevated d-dimer.     Correlation: Chest radiograph 4/5/2017.     Following the intravenous administration of 6.6 millicuries of  technetium 99m MAA, multiple perfusion images obtained.     Some inhomogeneous distribution of radionuclide.  No peripheral wedge-shaped perfusion defect to indicate pulmonary  embolism.     IMPRESSION:  Conclusion:  Low probability for pulmonary embolism.     [address]     Electronically signed by: Gene Fu MD 4/5/2017 8:47 PM CDT  Workstation:  [email or web address]      Patient Name: INGRID HARRIS  Patient ID: [id number]   Ordering: NATHALIA JARVIS  Attending:  Referring: NATHALIA JARVIS  ------------------------------------------------  Chest PA and lateral  CLINICAL INDICATION: Shortness of breath     COMPARISON: February 4, 2008.     FINDINGS: Cardiac silhouette is normal in size. Pulmonary  vascularity is unremarkable.      Subtle patchy infiltrative changes in both mid and lower lung  fields. Above findings suggest pneumonitis.     Lungs otherwise clear.     IMPRESSION:  CONCLUSION: Subtle bilateral patchy infiltrative changes in both  mid and lower lung fields.     Electronically signed by: Leo Kelly MD 4/5/2017 10:46 AM [facility]  Workstation: [email or web address]    Patient Name: INGRID HARRIS  Patient ID: [id number]   Ordering: SHERON KELLER  Attending: RIC GUILLORY  Referring: SHERON KELLER  ------------------------------------------------  Doppler duplex venous examination bilateral lower extremities.  CLINICAL INDICATION: Leg swelling.        COMPARISON: None     FINDINGS:     The common femoral, femoral, and popliteal veins of the  bilateral lower extremity are well identified and compress  normally. Doppler signals are heard either spontaneously or on  distal compression. No evidence of intraluminal thrombus was  noted.      The visualized posterior calf veins are unremarkable.     IMPRESSION:  CONCLUSION: No evidence of deep venous thrombosis in the common  femoral, superficial femoral veins, or popliteal veins of the  bilateral lower extremities.      Electronically signed by: Leo Kelly MD 4/6/2017 10:09 AM [facility]  Workstation: [email or web address]    Medication Review:   Current Facility-Administered Medications   Medication Dose Route Frequency Provider Last Rate Last Dose   • atorvastatin (LIPITOR) tablet 80 mg  80 mg Oral Nightly Bharath Dobbins MD PhD       • [START ON 4/7/2017] clopidogrel (PLAVIX) tablet 75 mg  75 mg Oral Daily Bharath  Harry Dobbins MD PhD       • dextrose (D50W) solution 25 g  25 g Intravenous Q15 Min PRN Hayley Valderrama MD       • dextrose (GLUTOSE) oral gel 15 g  15 g Oral Q15 Min PRN Hayley Valderrama MD       • gabapentin (NEURONTIN) capsule 100 mg  100 mg Oral Nightly Hayley Valderrama MD   100 mg at 04/05/17 2102   • glucagon (human recombinant) (GLUCAGEN DIAGNOSTIC) injection 1 mg  1 mg Subcutaneous Q15 Min PRN Hayley Valderrama MD       • heparin infusion 100 units/mL in 0.45% NaCl  1,000 Units/hr Intravenous Titrated Hayley Valderrama MD 12 mL/hr at 04/06/17 0447 1,200 Units/hr at 04/06/17 0447    And   • heparin (porcine) 5000 UNIT/ML injection 3,000 Units  3,000 Units Intravenous PRN Hayley Valderrama MD   3,000 Units at 04/06/17 0452    And   • heparin (porcine) 5000 UNIT/ML injection 2,000 Units  2,000 Units Intravenous PRN Hayley Valderrama MD       • hydrALAZINE (APRESOLINE) tablet 25 mg  25 mg Oral BID Hayley Valderrama MD   25 mg at 04/06/17 1031   • HYDROcodone-acetaminophen (NORCO)  MG per tablet 1 tablet  1 tablet Oral Q6H PRN Hayley Valderrama MD   1 tablet at 04/06/17 0728   • insulin aspart (novoLOG) injection 0-7 Units  0-7 Units Subcutaneous 4x Daily AC & at Bedtime Hayley Valderrama MD   2 Units at 04/06/17 0729   • lisinopril (PRINIVIL,ZESTRIL) tablet 20 mg  20 mg Oral Q24H Hayley Valderrama MD   20 mg at 04/06/17 1031   • metoprolol tartrate (LOPRESSOR) tablet 25 mg  25 mg Oral Q12H Bharath Dobbins MD PhD   25 mg at 04/06/17 1031   • pantoprazole (PROTONIX) EC tablet 40 mg  40 mg Oral Daily Hayley Valderrama MD   40 mg at 04/06/17 1031   • piperacillin-tazobactam (ZOSYN) 4.5 g in iso-osmotic dextrose 100 mL IVPB (premix)  4.5 g Intravenous Q6H Hayley Valderrama MD   4.5 g at 04/06/17 0649    And   • Pharmacy to dose vancomycin   Does not apply Continuous PRN Hayley Valderrama MD       • SITagliptin (JANUVIA) tablet 50 mg  50 mg Oral Daily Hayley Valderrama MD   50 mg at 04/06/17 1031   • sodium chloride 0.9 % flush 1-10 mL  1-10  mL Intravenous PRN Hayley Valderrama MD             Assessment/Plan     Principal Problem:    NSTEMI (non-ST elevated myocardial infarction)  Active Problems:    Type 2 diabetes mellitus    Osteoarthritis of multiple joints    Essential hypertension    Bacterial pneumonia    Morbid obesity with BMI of 50.0-59.9, adult    Acute cystitis    Plan    Medical management of NSTEMI per cardiology  Nephrology evaluation for BRANDO  Continue antibiotic regimen for UTI and pneumonia          This document has been electronically signed by Javed Childers DO on April 6, 2017 10:35 AM

## 2017-04-06 NOTE — H&P
NSTEMI (non-ST elevated myocardial infarction)  Subjective:     Blake Chavez is a 61 y.o. male who presents for shortness of breath.  Patient recently underwent a cystoscopy with stent placement a week ago today with Dr. Lopez.  On Friday he developed chest pain that was described as a pressure type sensation.  His wife states that he looked like he was having a stroke.  His blood pressure was 90s over 50s and he refused to come to the ER.  He was not speaking coherently.  He took some Xochitl-Coolidge and seemed to help his chest pain so he did not come in.  He has had left-sided pressure off and on as been really short of breath.  He's had dyspnea on exertion.  He has slept in a recliner for 8 years but denies any worsening of his breathing at night.  He's had some nausea but no vomiting.  No diaphoresis.  His weight has been steady but states he had lost down to 250s back in December. He is complaining of a lot of neck, hip, back, knee, and shoulder pain.  He denies any fever or chills.  He is supposed to have a colonoscopy next week with Dr. Green.  Any supposed to have another surgery with Dr. Lopez on Friday.      The following portions of the patient's history were reviewed and updated as appropriate: allergies, current medications, past family history, past medical history, past social history, past surgical history and problem list.    Concurrent Medical Hx:  Past Medical History:   Diagnosis Date   • Acute cystitis    • BPH (benign prostatic hypertrophy)    • Calculus of kidney and ureter     recently passed      • Chest pain    • Crohn's disease    • Edema     unspecified - bilateral lower extremities     • Essential hypertension    • Hip pain    • Hypertensive disorder    • Knee pain    • Morbid obesity    • Nausea    • Nuclear senile cataract    • Osteoarthritis of multiple joints    • Paraumbilical hernia    • Right hand fracture     as a teen   • Severe concentric left ventricular  hypertrophy    • Type 2 diabetes mellitus      no BDR    • Type 2 diabetes mellitus      no BDR        Past Surgical Hx:  Past Surgical History:   Procedure Laterality Date   • COLONOSCOPY  ; 11/03/0216    Diverticulosis in sigmoid colon. 1 polyp in sigmoid colon; removed by cold biopsy polypectomy. Hemorrhoids found   • CYSTOSCOPY, URETEROSCOPY, RETROGRADE PYELOGRAM, STENT INSERTION Bilateral 3/29/2017    Procedure: CYSTOSCOPY, BILATERAL RETROGRADE, URETEROSCOPY, LASER LITHOTRIPSY, STENT PLACEMENT; LASER LITHOTRIPSY BLADDER CALCULI;  Surgeon: Blake Lopez MD;  Location: [facility];  Service:    • HYDROCELE EXCISION / REPAIR     • INJECTION OF MEDICATION  04/18/2016    Kenalog   • UPPER GASTROINTESTINAL ENDOSCOPY  11/03/2016     Family Hx:  Family History   Problem Relation Age of Onset   • Cancer Other    • Diabetes Other    • Stroke Other    • Other Other      Colon problems    • Diabetes Mother    • Heart failure Mother    • Colon cancer Father    • Breast cancer Paternal Grandmother       Social History:  Social History     Social History   • Marital status:      Spouse name: N/A   • Number of children: N/A   • Years of education: N/A     Occupational History   • Not on file.     Social History Main Topics   • Smoking status: Never Smoker   • Smokeless tobacco: Never Used   • Alcohol use No   • Drug use: No   • Sexual activity: Defer     Other Topics Concern   • Not on file     Social History Narrative    Retired from the fire department.  Lives in [place] with wife and son.  Uses four pronged cane.       Home Medications:  No current facility-administered medications on file prior to encounter.      Current Outpatient Prescriptions on File Prior to Encounter   Medication Sig Dispense Refill   • diltiaZEM (TIAZAC) 360 MG 24 hr capsule TAKE 1 CAPSULE EVERY DAY 30 capsule 5   • FE BISGLY-FE [id number]-[id number] PO Take  by mouth Daily.     • gabapentin (NEURONTIN) 100 MG capsule TAKE 1 CAPSULE AT  BEDTIME FOR FOOT PAIN 30 capsule 5   • Glucosamine 500 MG capsule Take 500 mg by mouth 2 (Two) Times a Day.     • glyBURIDE (DIAbeta) 5 MG tablet TAKE 2 TABLETS TWICE DAILY BEFORE MEALS (Patient taking differently: TAKE 2 TABLETS BID) 120 tablet 5   • hydrALAZINE (APRESOLINE) 25 MG tablet Take 1 tablet by mouth 3 (Three) Times a Day. (Patient taking differently: Take 25 mg by mouth 2 (Two) Times a Day.) 90 tablet 3   • lisinopril (PRINIVIL,ZESTRIL) 20 MG tablet TAKE 1 TABLET BY MOUTH EVERY DAY FOR BLOOD PRESSURE 30 tablet 5   • nateglinide (STARLIX) 120 MG tablet TAKE 1 TABLET BY MOUTH BEFORE MEALS (Patient taking differently: daily) 90 tablet 3   • Omega-3 Fatty Acids (FISH OIL) 1000 MG capsule capsule Take 1,000 mg by mouth Daily With Breakfast.     • ONE TOUCH ULTRA TEST test strip USE THREE TIMES DAILY 100 each 11   • POLY-IRON 150 150 MG capsule TAKE 1 CAPSULE TWICE DAILY 60 capsule 3   • pravastatin (PRAVACHOL) 40 MG tablet Take 1 tablet by mouth Daily. 31 tablet 6   • promethazine (PHENERGAN) 12.5 MG tablet Take 1 tablet by mouth Every 6 (Six) Hours As Needed (prn). 30 tablet 1   • SITagliptin (JANUVIA) 100 MG tablet Take 1 tablet by mouth Daily. (Patient taking differently: Take 50 mg by mouth Daily.) 30 tablet 5   • VITAMIN D, ERGOCALCIFEROL, PO Take  by mouth Daily.     • amoxicillin-clavulanate (AUGMENTIN) 875-125 MG per tablet Take 1 tablet by mouth 2 (Two) Times a Day for 10 days. 20 tablet 0   • doxycycline (VIBRAMYCIN) 100 MG capsule Take 1 capsule by mouth Daily for 7 days. 7 capsule 0   • HYDROcodone-acetaminophen (NORCO)  MG per tablet Take 1 tablet by mouth Every 6 (Six) Hours As Needed for Moderate Pain (4-6).     • omeprazole (priLOSEC) 40 MG capsule Take 40 mg by mouth Daily.     • oxyCODONE-acetaminophen (PERCOCET) 7.5-325 MG per tablet Take 1-2 tablets by mouth Every 4 (Four) Hours As Needed (Pain). 15 tablet 0   • pantoprazole (PROTONIX) 40 MG EC tablet Take 40 mg by mouth Daily.          Allergies:  Review of patient's allergies indicates no known allergies.  I reviewed the patient's new clinical results.  Review of Systems  Review of Systems   Constitutional: Positive for activity change, appetite change and fatigue. Negative for chills, diaphoresis, fever and unexpected weight change.   HENT: Positive for congestion and sore throat. Negative for nosebleeds and trouble swallowing.         History has been irritated since the surgery and anesthesia   Respiratory: Positive for cough, chest tightness and shortness of breath. Negative for choking and wheezing.         His wife thought he had sleep apnea but he's never had a sleep study.  She states that since sleeping in the recliner his breathing is improved.   Cardiovascular: Positive for chest pain and leg swelling. Negative for palpitations.   Gastrointestinal: Positive for abdominal pain, anal bleeding, blood in stool and nausea. Negative for constipation and vomiting.   Genitourinary: Positive for dysuria and hematuria.   Musculoskeletal: Positive for arthralgias, back pain, gait problem, joint swelling, myalgias, neck pain and neck stiffness.   Skin: Negative for rash.   Neurological: Positive for weakness, numbness and headaches.   Hematological: Does not bruise/bleed easily.   Psychiatric/Behavioral: Positive for sleep disturbance. Negative for behavioral problems, confusion, decreased concentration, dysphoric mood, hallucinations, self-injury and suicidal ideas. The patient is not nervous/anxious and is not hyperactive.        Objective:     /61 (BP Location: Left arm, Patient Position: Sitting)  Pulse 95  Temp 97.5 °F (36.4 °C) (Temporal Artery )   Resp 22  Wt (!) 386 lb 6.4 oz (175 kg)  SpO2 93%  BMI 50.98 kg/m2  Physical Exam  I reviewed the patient's new clinical results.  Assessment/Plan:     Active Hospital Problems (** Indicates Principal Problem)    Diagnosis Date Noted   • **NSTEMI (non-ST elevated myocardial  infarction) [I21.4] 04/05/2017   • Bacterial pneumonia [J15.9] 04/05/2017   • Morbid obesity with BMI of 50.0-59.9, adult [E66.01, Z68.43] 04/05/2017   • Essential hypertension [I10]    • Osteoarthritis of multiple joints [M15.9]    • Type 2 diabetes mellitus [E11.9]       no BDR         Resolved Hospital Problems    Diagnosis Date Noted Date Resolved   No resolved problems to display.     [facility] [id number] reviewed, scanned, and appropriate.  Dr. Dobbins's been consultative for the non-STEMI, heparin drip started, echocardiogram ordered, and repeat troponins.  VQ scan for possible pulmonary emboli is pending.  Ultrasound bilateral lower extremity for possible DVTs.  Consult dietary for morbid obesity.  Consult Dr. Brand for acute on chronic renal failure.  Norco for pain.  Insulin sliding scale for his diabetes mellitus type 2.  Hypertension continue his current medications.  Possible bacterial pneumonia we will treat with Zosyn and vancomycin.  Blood cultures, sputum cultures, and urine culture pending.  Patient wishes to be a full code.          This document has been electronically signed by Hayley Valderrama MD on April 5, 2017 8:49 PM          This document has been electronically signed by Hayley Valderrama MD on April 5, 2017 8:49 PM

## 2017-04-06 NOTE — PLAN OF CARE
Problem: Patient Care Overview (Adult)  Goal: Plan of Care Review  Outcome: Ongoing (interventions implemented as appropriate)  Making Lifestyle Changes for a Healthier Weight and Heart Healthy diet info used to provide ed regarding Wt Loss Low Sodium diet and diet copy given.    04/06/17 4952   Coping/Psychosocial Response Interventions   Plan Of Care Reviewed With patient   Patient Care Overview   Progress no change   Outcome Evaluation   Outcome Summary/Follow up Plan initial asessment

## 2017-04-07 LAB
ALBUMIN SERPL-MCNC: 3.4 G/DL (ref 3.4–4.8)
ALBUMIN/GLOB SERPL: 1.1 G/DL (ref 1.1–1.8)
ALP SERPL-CCNC: 106 U/L (ref 38–126)
ALT SERPL W P-5'-P-CCNC: 12 U/L (ref 21–72)
ANION GAP SERPL CALCULATED.3IONS-SCNC: 15 MMOL/L (ref 5–15)
APTT PPP: 37.7 SECONDS (ref 20–40.3)
APTT PPP: 47.1 SECONDS (ref 20–40.3)
APTT PPP: 48.5 SECONDS (ref 20–40.3)
AST SERPL-CCNC: 23 U/L (ref 17–59)
BACTERIA SPEC AEROBE CULT: NORMAL
BACTERIA SPEC AEROBE CULT: NORMAL
BACTERIA UR QL AUTO: ABNORMAL /HPF
BASOPHILS # BLD AUTO: 0.03 10*3/MM3 (ref 0–0.2)
BASOPHILS NFR BLD AUTO: 0.2 % (ref 0–2)
BILIRUB SERPL-MCNC: 0.4 MG/DL (ref 0.2–1.3)
BILIRUB UR QL STRIP: NEGATIVE
BUN BLD-MCNC: 48 MG/DL (ref 7–21)
BUN/CREAT SERPL: 14.9 (ref 7–25)
CALCIUM SPEC-SCNC: 9 MG/DL (ref 8.4–10.2)
CHLORIDE SERPL-SCNC: 113 MMOL/L (ref 95–110)
CLARITY UR: ABNORMAL
CO2 SERPL-SCNC: 20 MMOL/L (ref 22–31)
COLOR UR: YELLOW
CREAT BLD-MCNC: 3.22 MG/DL (ref 0.7–1.3)
DEPRECATED RDW RBC AUTO: 40.6 FL (ref 35.1–43.9)
EOSINOPHIL # BLD AUTO: 0.02 10*3/MM3 (ref 0–0.7)
EOSINOPHIL NFR BLD AUTO: 0.1 % (ref 0–7)
ERYTHROCYTE [DISTWIDTH] IN BLOOD BY AUTOMATED COUNT: 15.1 % (ref 11.5–14.5)
FINE GRAN CASTS URNS QL MICRO: ABNORMAL /LPF
GFR SERPL CREATININE-BSD FRML MDRD: 20 ML/MIN/1.73 (ref 60–113)
GLOBULIN UR ELPH-MCNC: 3.2 GM/DL (ref 2.3–3.5)
GLUCOSE BLD-MCNC: 164 MG/DL (ref 60–100)
GLUCOSE BLDC GLUCOMTR-MCNC: 115 MG/DL (ref 70–130)
GLUCOSE BLDC GLUCOMTR-MCNC: 127 MG/DL (ref 70–130)
GLUCOSE BLDC GLUCOMTR-MCNC: 135 MG/DL (ref 70–130)
GLUCOSE UR STRIP-MCNC: NEGATIVE MG/DL
HCT VFR BLD AUTO: 29.7 % (ref 39–49)
HGB BLD-MCNC: 9.4 G/DL (ref 13.7–17.3)
HGB UR QL STRIP.AUTO: ABNORMAL
HYALINE CASTS UR QL AUTO: ABNORMAL /LPF
IMM GRANULOCYTES # BLD: 0.08 10*3/MM3 (ref 0–0.02)
IMM GRANULOCYTES NFR BLD: 0.5 % (ref 0–0.5)
KETONES UR QL STRIP: NEGATIVE
LEUKOCYTE ESTERASE UR QL STRIP.AUTO: ABNORMAL
LYMPHOCYTES # BLD AUTO: 0.79 10*3/MM3 (ref 0.6–4.2)
LYMPHOCYTES NFR BLD AUTO: 4.8 % (ref 10–50)
MCH RBC QN AUTO: 23.5 PG (ref 26.5–34)
MCHC RBC AUTO-ENTMCNC: 31.6 G/DL (ref 31.5–36.3)
MCV RBC AUTO: 74.3 FL (ref 80–98)
MONOCYTES # BLD AUTO: 1.88 10*3/MM3 (ref 0–0.9)
MONOCYTES NFR BLD AUTO: 11.5 % (ref 0–12)
NEUTROPHILS # BLD AUTO: 13.59 10*3/MM3 (ref 2–8.6)
NEUTROPHILS NFR BLD AUTO: 82.9 % (ref 37–80)
NITRITE UR QL STRIP: NEGATIVE
PH UR STRIP.AUTO: <=5 [PH] (ref 5–9)
PHOSPHATE SERPL-MCNC: 4.9 MG/DL (ref 2.4–4.4)
PLATELET # BLD AUTO: 261 10*3/MM3 (ref 150–450)
PMV BLD AUTO: 8.3 FL (ref 8–12)
POTASSIUM BLD-SCNC: 4.4 MMOL/L (ref 3.5–5.1)
POTASSIUM BLD-SCNC: 4.4 MMOL/L (ref 3.5–5.1)
PROT SERPL-MCNC: 6.6 G/DL (ref 6.3–8.6)
PROT UR QL STRIP: ABNORMAL
RBC # BLD AUTO: 4 10*6/MM3 (ref 4.37–5.74)
RBC # UR: ABNORMAL /HPF
REF LAB TEST METHOD: ABNORMAL
SODIUM BLD-SCNC: 148 MMOL/L (ref 137–145)
SP GR UR STRIP: 1.01 (ref 1–1.03)
SQUAMOUS #/AREA URNS HPF: ABNORMAL /HPF
TROPONIN I SERPL-MCNC: 0.94 NG/ML
UROBILINOGEN UR QL STRIP: ABNORMAL
WBC NRBC COR # BLD: 16.39 10*3/MM3 (ref 3.2–9.8)
WBC UR QL AUTO: ABNORMAL /HPF

## 2017-04-07 PROCEDURE — 84132 ASSAY OF SERUM POTASSIUM: CPT | Performed by: FAMILY MEDICINE

## 2017-04-07 PROCEDURE — 99233 SBSQ HOSP IP/OBS HIGH 50: CPT | Performed by: INTERNAL MEDICINE

## 2017-04-07 PROCEDURE — 99232 SBSQ HOSP IP/OBS MODERATE 35: CPT | Performed by: FAMILY MEDICINE

## 2017-04-07 PROCEDURE — 25010000002 HEPARIN (PORCINE) PER 1000 UNITS: Performed by: FAMILY MEDICINE

## 2017-04-07 PROCEDURE — 94799 UNLISTED PULMONARY SVC/PX: CPT

## 2017-04-07 PROCEDURE — 94660 CPAP INITIATION&MGMT: CPT

## 2017-04-07 PROCEDURE — 84100 ASSAY OF PHOSPHORUS: CPT | Performed by: FAMILY MEDICINE

## 2017-04-07 PROCEDURE — 81001 URINALYSIS AUTO W/SCOPE: CPT | Performed by: FAMILY MEDICINE

## 2017-04-07 PROCEDURE — 25010000002 PIPERACILLIN SOD-TAZOBACTAM PER 1 G: Performed by: INTERNAL MEDICINE

## 2017-04-07 PROCEDURE — 82962 GLUCOSE BLOOD TEST: CPT

## 2017-04-07 PROCEDURE — 94760 N-INVAS EAR/PLS OXIMETRY 1: CPT

## 2017-04-07 PROCEDURE — 25010000002 LEVOFLOXACIN PER 250 MG: Performed by: FAMILY MEDICINE

## 2017-04-07 PROCEDURE — 80053 COMPREHEN METABOLIC PANEL: CPT | Performed by: FAMILY MEDICINE

## 2017-04-07 PROCEDURE — 85025 COMPLETE CBC W/AUTO DIFF WBC: CPT | Performed by: FAMILY MEDICINE

## 2017-04-07 PROCEDURE — 85730 THROMBOPLASTIN TIME PARTIAL: CPT | Performed by: INTERNAL MEDICINE

## 2017-04-07 PROCEDURE — 85730 THROMBOPLASTIN TIME PARTIAL: CPT | Performed by: FAMILY MEDICINE

## 2017-04-07 PROCEDURE — 63710000001 INSULIN ASPART PER 5 UNITS: Performed by: FAMILY MEDICINE

## 2017-04-07 PROCEDURE — 84484 ASSAY OF TROPONIN QUANT: CPT | Performed by: FAMILY MEDICINE

## 2017-04-07 PROCEDURE — 25010000002 VANCOMYCIN PER 500 MG: Performed by: FAMILY MEDICINE

## 2017-04-07 PROCEDURE — 87186 SC STD MICRODIL/AGAR DIL: CPT | Performed by: INTERNAL MEDICINE

## 2017-04-07 PROCEDURE — 87086 URINE CULTURE/COLONY COUNT: CPT | Performed by: INTERNAL MEDICINE

## 2017-04-07 PROCEDURE — 25010000002 FUROSEMIDE PER 20 MG: Performed by: INTERNAL MEDICINE

## 2017-04-07 RX ORDER — LEVOFLOXACIN 5 MG/ML
500 INJECTION, SOLUTION INTRAVENOUS EVERY 24 HOURS
Status: DISCONTINUED | OUTPATIENT
Start: 2017-04-07 | End: 2017-04-10

## 2017-04-07 RX ORDER — FUROSEMIDE 10 MG/ML
100 INJECTION INTRAMUSCULAR; INTRAVENOUS ONCE
Status: COMPLETED | OUTPATIENT
Start: 2017-04-07 | End: 2017-04-07

## 2017-04-07 RX ADMIN — TAZOBACTAM SODIUM AND PIPERACILLIN SODIUM 2.25 G: 250; 2 INJECTION, SOLUTION INTRAVENOUS at 23:45

## 2017-04-07 RX ADMIN — Medication 10 ML: at 12:50

## 2017-04-07 RX ADMIN — TAZOBACTAM SODIUM AND PIPERACILLIN SODIUM 2.25 G: 250; 2 INJECTION, SOLUTION INTRAVENOUS at 12:40

## 2017-04-07 RX ADMIN — PANTOPRAZOLE SODIUM 40 MG: 40 TABLET, DELAYED RELEASE ORAL at 09:11

## 2017-04-07 RX ADMIN — HEPARIN SODIUM 2000 UNITS: 5000 INJECTION, SOLUTION INTRAVENOUS; SUBCUTANEOUS at 16:28

## 2017-04-07 RX ADMIN — LEVOFLOXACIN 500 MG: 5 INJECTION, SOLUTION INTRAVENOUS at 11:21

## 2017-04-07 RX ADMIN — ATORVASTATIN CALCIUM 80 MG: 40 TABLET, FILM COATED ORAL at 20:31

## 2017-04-07 RX ADMIN — FUROSEMIDE 100 MG: 10 INJECTION, SOLUTION INTRAVENOUS at 09:10

## 2017-04-07 RX ADMIN — SITAGLIPTIN 50 MG: 25 TABLET, FILM COATED ORAL at 09:09

## 2017-04-07 RX ADMIN — Medication: at 20:32

## 2017-04-07 RX ADMIN — TAZOBACTAM SODIUM AND PIPERACILLIN SODIUM 2.25 G: 250; 2 INJECTION, SOLUTION INTRAVENOUS at 06:19

## 2017-04-07 RX ADMIN — Medication: at 09:11

## 2017-04-07 RX ADMIN — HEPARIN SODIUM 1800 UNITS/HR: 10000 INJECTION, SOLUTION INTRAVENOUS at 11:09

## 2017-04-07 RX ADMIN — GABAPENTIN 100 MG: 100 CAPSULE ORAL at 20:31

## 2017-04-07 RX ADMIN — CLOPIDOGREL BISULFATE 75 MG: 75 TABLET ORAL at 09:09

## 2017-04-07 RX ADMIN — INSULIN ASPART 2 UNITS: 100 INJECTION, SOLUTION INTRAVENOUS; SUBCUTANEOUS at 06:53

## 2017-04-07 RX ADMIN — VANCOMYCIN HYDROCHLORIDE 2000 MG: 1 INJECTION, POWDER, LYOPHILIZED, FOR SOLUTION INTRAVENOUS at 02:40

## 2017-04-07 RX ADMIN — HEPARIN SODIUM 3000 UNITS: 5000 INJECTION, SOLUTION INTRAVENOUS; SUBCUTANEOUS at 06:50

## 2017-04-07 RX ADMIN — METOPROLOL TARTRATE 25 MG: 25 TABLET ORAL at 20:31

## 2017-04-07 NOTE — PROGRESS NOTES
LOS: 1 day   Patient Care Team:  Himanshu Tovar MD as PCP - General (Family Medicine)    Chief Complaint:  Shortness of breath is worsening        Interval History:     SUBJECTIVE:  Called tonight because of worsening shortness of breath and drop in O2 sats and respiratory distress.  This gentleman was admitted with a non-Q-wave myocardial infarction.  Today he's had progressive shortness of breath but did get worse tonight.  He has been dropping his oxygen was and I was notified of his worsening.  He says he cannot breathe laying down.  He is receiving IV fluids and is drinking quite a bit today.  Could not lie still for a CTA earlier and is on heparin drip also on antibiotic for possible pneumonia  Chart review shows that this gentleman has chronic renal disease followed by nephrology.  He also has by lateral  kidney stones with a J stent being followed by cardiology as well    History taken from: patient chart RN    Objective     Vital Signs  Temp:  [97 °F (36.1 °C)-98 °F (36.7 °C)] 98 °F (36.7 °C)  Heart Rate:  [83-96] 96  Resp:  [20-22] 22  BP: (130-157)/(63-83) 135/69  Last 3 weights    04/05/17  1659 04/06/17  0500 04/06/17  1542   Weight: (!) 386 lb 6.4 oz (175 kg) (!) 387 lb 3.2 oz (176 kg) (!) 388 lb 0.2 oz (176 kg)         Physical Exam:     General Appearance:    Alert, cooperative, in no acute distress while he is sitting up breathing about 20 times per minute. but he is on a nonrebreather mask at this time    Head:    Normocephalic, without obvious abnormality, atraumatic   Eyes:            Lids and lashes normal, conjunctivae and sclerae normal, no   icterus, no pallor, corneas clear, PERRLA   Throat:   No oral lesions, no thrush, oral mucosa moist   Neck:   No adenopathy, supple, trachea midline, no thyromegaly, no   carotid bruit, no JVD       Lungs:     he has good bilateral air movement but he has rales in both bases     Heart:    Regular rhythm and normal rate, normal S1 and S2, no             murmur, no gallop, no rub, no click    Chest Wall:    No abnormalities observed   Abdomen:     Normal bowel sounds, no masses, no organomegaly, soft        non-tender, non-distended, no guarding, no rebound                Tenderness    Extremities:   trace edema noted        Skin:   No bleeding, bruising or rash   Lymph nodes:   No palpable adenopathy   Neurologic:   Cranial nerves 2 - 12 grossly intact, sensation intact, DTR       present and equal bilaterally       Echocardiogram showed ejection fraction of 50-55% with diastolic dysfunction and severe pulmonary hypertension  RESULTS REVIEW:     Lab Results (last 24 hours)     Procedure Component Value Units Date/Time    Urinalysis, Microscopic Only [[id number]]  (Abnormal) Collected:  04/05/17 1832    Specimen:  Urine from Urine, Clean Catch Updated:  04/05/17 2116     RBC, UA 3-5 (A) /HPF      WBC, UA Too Numerous to Count (A) /HPF      Bacteria, UA 3+ (A) /HPF      Squamous Epithelial Cells, UA 3-5 (A) /HPF      Hyaline Casts, UA None Seen /LPF      Methodology Manual Light Microscopy    CK Total & CKMB [[id number]]  (Normal) Collected:  04/05/17 2051    Specimen:  Blood Updated:  04/05/17 2139     CKMB 2.47 ng/mL      Creatine Kinase 75 U/L     Troponin [[id number]]  (Abnormal) Collected:  04/05/17 2051    Specimen:  Blood Updated:  04/05/17 2141     Troponin I 1.280 (C) ng/mL     Extra Tubes [[id number]] Collected:  04/05/17 1712    Specimen:  Blood from Blood, Venous Line Updated:  04/05/17 2202    Narrative:       The following orders were created for panel order Extra Tubes.  Procedure                               Abnormality         Status                     ---------                               -----------         ------                     Light Blue Top[[id number]]                                    Final result               Lavender Top[[id number]]                                      Final result               Gold Top - SST[[id number]]                                     Final result                 Please view results for these tests on the individual orders.    Light Blue Top [[id number]] Collected:  04/05/17 1712    Specimen:  Blood Updated:  04/05/17 2202     Extra Tube hold for add-on      Auto resulted       Lavender Top [[id number]] Collected:  04/05/17 1712    Specimen:  Blood Updated:  04/05/17 2202     Extra Tube hold for add-on      Auto resulted       Gold Top - SST [[id number]] Collected:  04/05/17 1712    Specimen:  Blood Updated:  04/05/17 2202     Extra Tube Hold for add-ons.      Auto resulted.       POC Glucose Fingerstick [[id number]]  (Abnormal) Collected:  04/05/17 2051    Specimen:  Blood Updated:  04/05/17 2353     Glucose 193 (H) mg/dL       RN NotifiedMeter: [id number]: [id number] CHESNEL LISSET       CK Total & CKMB [[id number]]  (Normal) Collected:  04/05/17 2319    Specimen:  Blood Updated:  04/06/17 0046     CKMB 2.45 ng/mL      Creatine Kinase 72 U/L     Troponin [[id number]]  (Abnormal) Collected:  04/05/17 2319    Specimen:  Blood Updated:  04/06/17 0050     Troponin I 1.220 (C) ng/mL     CBC (No Diff) [[id number]]  (Abnormal) Collected:  04/06/17 0245    Specimen:  Blood Updated:  04/06/17 0314     WBC 15.23 (H) 10*3/mm3      RBC 3.77 (L) 10*6/mm3      Hemoglobin 9.0 (L) g/dL      Hematocrit 28.5 (L) %      MCV 75.6 (L) fL      MCH 23.9 (L) pg      MCHC 31.6 g/dL      RDW 15.1 (H) %      RDW-SD 41.9 fl      MPV 8.4 fL      Platelets 267 10*3/mm3     Renal Function Panel [[id number]]  (Abnormal) Collected:  04/06/17 0245    Specimen:  Blood Updated:  04/06/17 0333     Glucose 196 (H) mg/dL      BUN 41 (H) mg/dL      Creatinine 3.10 (H) mg/dL      Sodium 148 (H) mmol/L      Potassium 4.4 mmol/L      Chloride 113 (H) mmol/L      CO2 18.0 (L) mmol/L      Calcium 9.3 mg/dL      Albumin 3.60 g/dL      Phosphorus 3.9 mg/dL      Anion Gap 17.0 (H) mmol/L      BUN/Creatinine Ratio 13.2     eGFR Non African Amer 20 (L) mL/min/1.73     CK Total & CKMB  [[id number]]  (Normal) Collected:  04/06/17 0245    Specimen:  Blood Updated:  04/06/17 0346     CKMB 2.53 ng/mL      Creatine Kinase 73 U/L     Troponin [[id number]]  (Abnormal) Collected:  04/06/17 0245    Specimen:  Blood Updated:  04/06/17 0350     Troponin I 1.250 (C) ng/mL     aPTT [[id number]]  (Normal) Collected:  04/06/17 0245    Specimen:  Blood Updated:  04/06/17 0407     PTT 37.5 seconds     Narrative:       The recommended Heparin therapeutic range is 68-97 seconds.    POC Glucose Fingerstick [[id number]]  (Abnormal) Collected:  04/06/17 0623    Specimen:  Blood Updated:  04/06/17 0658     Glucose 182 (H) mg/dL       RN NotifiedMeter: [id number]: [id number] CHESNEL LISSET       aPTT [[id number]]  (Normal) Collected:  04/06/17 0806    Specimen:  Blood Updated:  04/06/17 0852     PTT 39.2 seconds     Narrative:       The recommended Heparin therapeutic range is 68-97 seconds.    Mycoplasma Pneumoniae PCR [[id number]] Collected:  04/05/17 1719    Specimen:  Sputum from Nasopharynx Updated:  04/06/17 0916     MYCOPLASMAE PNEUMONIAE BY PCR Negative    Narrative:       This test is performed by utilizing real time polymerace chain recation (PCR).   This test is performed by utilizing real time polymerace chain recation (PCR).     POC Glucose Fingerstick [[id number]]  (Abnormal) Collected:  04/06/17 1023    Specimen:  Blood Updated:  04/06/17 1035     Glucose 228 (H) mg/dL       RN NotifiedMeter: [id number]: [id number] GRACIELA CHAUDHRY       aPTT [[id number]]  (Normal) Collected:  04/06/17 1423    Specimen:  Blood Updated:  04/06/17 1501     PTT 32.0 seconds     Narrative:       The recommended Heparin therapeutic range is 68-97 seconds.    POC Glucose Fingerstick [[id number]]  (Abnormal) Collected:  04/06/17 1632    Specimen:  Blood Updated:  04/06/17 1725     Glucose 214 (H) mg/dL       RN NotifiedMeter: [id number]: [id number] GRACIELA CHAUDHRY       Blood Culture [[id number]]  (Normal) Collected:   04/05/17 1712    Specimen:  Blood from Arm, Left Updated:  04/06/17 1801     Blood Culture No growth at 24 hours    Blood Culture [[id number]]  (Normal) Collected:  04/05/17 1702    Specimen:  Blood from Arm, Right Updated:  04/06/17 1801     Blood Culture No growth at 24 hours    Blood Gas, Arterial [[id number]]  (Abnormal) Collected:  04/06/17 2035    Specimen:  Arterial Blood Updated:  04/06/17 2046     Site --      Not performed at this site.        Josias's Test --      Not performed at this site.        pH, Arterial 7.353 pH units      pCO2, Arterial 32.1 (L) mm Hg      pO2, Arterial 205.4 (H) mm Hg      HCO3, Arterial 17.4 (L) mmol/L      Base Excess, Arterial -7.2 (L) mmol/L      O2 Saturation, Arterial 99.2 %      Hemoglobin, Blood Gas 10.0 (L) g/dL      Hematocrit, Blood Gas 29.0 (L) %      CO2 Content 18.4 (L)     Sodium, Arterial 144.1 mmol/L      Potassium, Arterial 4.51 mmol/L      Glucose, Arterial 241 mmol/L      Barometric Pressure for Blood Gas -- mmHg       Not performed at this site.        Modality --      Not performed at this site.        Ionized Calcium 4.9 mg/dL         Imaging Results (last 72 hours)     Procedure Component Value Units Date/Time    NM Lung Scan Perfusion Particulate [[id number]] Collected:  04/05/17 2044     Updated:  04/05/17 2048    Narrative:       Patient Name:  INGRID HARRIS  Patient ID:  [id number]   Ordering:  SHERON KELLER  Attending:  SHERON KELLER  Referring:  SHERON KELLER  ------------------------------------------------    Nuclear medicine perfusion lung scan    History: Shortness of breath. Elevated d-dimer.    Correlation: Chest radiograph 4/5/2017.    Following the intravenous administration of 6.6 millicuries of  technetium 99m MAA, multiple perfusion images obtained.    Some inhomogeneous distribution of radionuclide.  No peripheral wedge-shaped perfusion defect to indicate pulmonary  embolism.      Impression:       Conclusion:  Low probability for  pulmonary embolism.    [id number]    Electronically signed by:  Gene Fu MD  4/5/2017 8:47 PM CDT  Workstation: [employer] Venous Doppler Lower Extremity Bilateral (duplex) [[id number]] Collected:  04/06/17 1007     Updated:  04/06/17 1010    Narrative:       Patient Name:  INGRID HARRIS  Patient ID:  [id number]   Ordering:  SHERON KELLER  Attending:  RIC GUILLORY  Referring:  SHERON KELLER  ------------------------------------------------  Doppler duplex venous examination bilateral lower extremities.  CLINICAL INDICATION: Leg swelling.      COMPARISON: None    FINDINGS:    The common femoral,  femoral, and popliteal veins of the  bilateral     lower extremity are well identified and compress  normally.  Doppler signals are heard either spontaneously or on  distal compression.  No evidence of intraluminal thrombus was  noted.     The visualized posterior calf veins are unremarkable.      Impression:       CONCLUSION:  No evidence of deep venous thrombosis in the common  femoral, superficial femoral veins, or popliteal veins of the  bilateral lower extremities.         Electronically signed by:  Leo Kelly MD  4/6/2017 10:09 AM CDT  Workstation: [id number]          Assessment/Plan  acute shortness of breath and I think this is fluid overload    Principal Problem:    NSTEMI (non-ST elevated myocardial infarction)  Active Problems:    Type 2 diabetes mellitus    Osteoarthritis of multiple joints    Essential hypertension    Bacterial pneumonia    Morbid obesity with BMI of 50.0-59.9, adult    Acute cystitis        Continue nonrebreather mask IV Lasix and I'm going to put him in the ICU tonight for close monitoring  We'll get an EKG and a chest x-ray on presentation to the ICU  This was discussed with the patient and his family in the room      Hector Warren MD  04/06/17  8:56 PM        1000 pm resting comfortably  cxr with fluffy infiltrates bilaterally  ekg unchanged  Unusual chronic  wounds notred right buttock and scrotum  Will get wound consult  Lab in am

## 2017-04-07 NOTE — PROGRESS NOTES
"[facility]  [address] [place] [address]. [address]  T - [phone]  F - [phone]     Progress Note          PATIENT  DEMOGRAPHICS   PATIENT NAME: Blake Chavez                      PHYSICIAN: Vaibhav Avalos MD  : [id number]  MRN: [id number]   LOS: 2 days    Patient Care Team:  Himanshu Tovar MD as PCP - General (Family Medicine)  Subjective   SUBJECTIVE   Mr. Chavez states that he's having some shortness of breath.  He is on BiPAP now.  He received 100 mg of Lasix this morning and he diuresed well with that.  His urine output over the last 12 hours have been around 1100 cc.  CT scan of the abdomen could not be done yesterday because of the weight limit on the machine        Objective   OBJECTIVE   Vital Signs  Temp:  [97 °F (36.1 °C)-99.2 °F (37.3 °C)] 99.2 °F (37.3 °C)  Heart Rate:  [] 97  Resp:  [19-28] 20  BP: (112-182)/(57-96) 155/75    Flowsheet Rows         First Filed Value    Admission Height  72.99\" (185.4 cm) Documented at 2017 1542    Admission Weight  (!)  386 lb 6.4 oz (175 kg) Documented at 2017 1659           I/O last 3 completed shifts:  In: 1123.8 [I.V.:423.8; IV Piggyback:700]  Out: 1350 [Urine:1350]    PHYSICAL EXAM    Physical Exam   Lungs few crackles at the bases.  Heart regular rate and rhythm with distant heart sounds.  Abdomen morbidly obese nontender nondistended bowel sounds are present.    Extremities 2+ pitting edema bilaterally    RESULTS   Results Review:      Results from last 7 days  Lab Units 17  0930 17  0600 17  2035 17  0245  17  1147   SODIUM mmol/L  --  148*  --  148*  --  149*   SODIUM, ARTERIAL mmol/L  --   --  144.1  --   < >  --    POTASSIUM mmol/L 4.4 4.4  --  4.4  --  4.8   CHLORIDE mmol/L  --  113*  --  113*  --  113*   TOTAL CO2 mmol/L  --  20.0*  --  18.0*  --  20.0*   BUN mg/dL  --  48*  --  41*  --  40*   CREATININE mg/dL  --  3.22*  --  3.10*  --  2.99*   CALCIUM " mg/dL  --  9.0  --  9.3  --  9.5   BILIRUBIN mg/dL  --  0.4  --   --   --  0.4   ALK PHOS U/L  --  106  --   --   --  120   ALT (SGPT) U/L  --  12*  --   --   --  12*   AST (SGOT) U/L  --  23  --   --   --  19   GLUCOSE mg/dL  --  164*  --  196*  --  180*   GLUCOSE, ARTERIAL mmol/L  --   --  241  --   < >  --    < > = values in this interval not displayed.    Estimated Creatinine Clearance: 39.5 mL/min (by C-G formula based on Cr of 3.22).      Results from last 7 days  Lab Units 04/07/17  0600 04/06/17  0245   PHOSPHORUS mg/dL 4.9* 3.9               Results from last 7 days  Lab Units 04/07/17  0600 04/06/17  0245 04/05/17  1147   WBC 10*3/mm3 16.39* 15.23* 13.98*   HEMOGLOBIN g/dL 9.4* 9.0* 8.9*   PLATELETS 10*3/mm3 261 267 248         Results from last 7 days  Lab Units 04/05/17  1712   INR  1.28*         Imaging Results (last 24 hours)     Procedure Component Value Units Date/Time    XR Chest 1 View [[id number]] Collected:  04/06/17 2132     Updated:  04/06/17 2137    Narrative:       Exam: AP portable chest    INDICATION: Dyspnea    COMPARISON: 4/5/2017    FINDINGS: AP portable chest. Heart size upper limits of normal.  Interval increase in the bilateral airspace opacity and  infiltrates. Pulmonary vascularity is mildly prominent. Cannot  exclude a small left pleural effusion.      Impression:       Interval increase in the bilateral airspace  opacity/infiltrates    Electronically signed by:  Vicente Nascimento MD  4/6/2017 9:36 PM CDT  Workstation: [id number]           MEDICATIONS      atorvastatin 80 mg Oral Nightly   clopidogrel 75 mg Oral Daily   gabapentin 100 mg Oral Nightly   hydrALAZINE 25 mg Oral BID   insulin aspart 0-7 Units Subcutaneous 4x Daily AC & at Bedtime   magic butt ointment  Topical BID   metoprolol tartrate 25 mg Oral Q12H   pantoprazole 40 mg Oral Daily   piperacillin-tazobactam 2.25 g Intravenous Q6H   SITagliptin 50 mg Oral Daily   vancomycin 2,000 mg Intravenous Q24H       heparin  (porcine) 1,000 Units/hr Last Rate: 1,800 Units/hr (04/07/17 8038)   Pharmacy to dose vancomycin         Assessment/Plan   ASSESSMENT / PLAN    Principal Problem:    NSTEMI (non-ST elevated myocardial infarction)  Active Problems:    Type 2 diabetes mellitus    Osteoarthritis of multiple joints    Essential hypertension    Bacterial pneumonia    Morbid obesity with BMI of 50.0-59.9, adult    Acute cystitis    1.  Acute kidney injury with slight worsening in the renal function over the past 24 hours, currently he is volume overloaded and I agree with Lasix although this may result in some further worsening of the renal function.  2.  Non-ST elevation MI patient is not clear for contrast administration at this point.  Risk of acute kidney injury/need for dialysis is extremely high           Vaibhav Avalos MD  04/07/17  10:36 AM

## 2017-04-07 NOTE — CONSULTS
Referring Provider: viral  Reason for Consultation: Renal failure    Patient Care Team:  Himanshu Tovar MD as PCP - General (Family Medicine)    Chief complaint flank pain chest pain    Subjective .     History of present illness:  Kidney stone bilateral with double-J stent right side    Review of Systems  Pertinent items are noted in HPI    History  Past Medical History:   Diagnosis Date   • Acute cystitis    • BPH (benign prostatic hypertrophy)    • Calculus of kidney and ureter     recently passed      • Chest pain    • Crohn's disease    • Edema     unspecified - bilateral lower extremities     • Essential hypertension    • Hip pain    • Hypertensive disorder    • Knee pain    • Morbid obesity    • Nausea    • Nuclear senile cataract    • Osteoarthritis of multiple joints    • Paraumbilical hernia    • Right hand fracture     as a teen   • Severe concentric left ventricular hypertrophy    • Type 2 diabetes mellitus      no BDR    • Type 2 diabetes mellitus      no BDR    ,   Past Surgical History:   Procedure Laterality Date   • COLONOSCOPY  ; 11/03/0216    Diverticulosis in sigmoid colon. 1 polyp in sigmoid colon; removed by cold biopsy polypectomy. Hemorrhoids found   • CYSTOSCOPY, URETEROSCOPY, RETROGRADE PYELOGRAM, STENT INSERTION Bilateral 3/29/2017    Procedure: CYSTOSCOPY, BILATERAL RETROGRADE, URETEROSCOPY, LASER LITHOTRIPSY, STENT PLACEMENT; LASER LITHOTRIPSY BLADDER CALCULI;  Surgeon: Blake Lopez MD;  Location: [facility];  Service:    • HYDROCELE EXCISION / REPAIR     • INJECTION OF MEDICATION  04/18/2016    Kenalog   • UPPER GASTROINTESTINAL ENDOSCOPY  11/03/2016   ,   Family History   Problem Relation Age of Onset   • Cancer Other    • Diabetes Other    • Stroke Other    • Other Other      Colon problems    • Diabetes Mother    • Heart failure Mother    • Colon cancer Father    • Breast cancer Paternal Grandmother    ,   Social History   Substance Use Topics   • Smoking status: Never Smoker    • Smokeless tobacco: Never Used   • Alcohol use No   ,   Prescriptions Prior to Admission   Medication Sig Dispense Refill Last Dose   • diltiaZEM (TIAZAC) 360 MG 24 hr capsule TAKE 1 CAPSULE EVERY DAY 30 capsule 5 3/29/2017 at 1000   • FE BISGLY-FE [id number] PO Take  by mouth Daily.   3/28/2017 at 2200   • gabapentin (NEURONTIN) 100 MG capsule TAKE 1 CAPSULE AT BEDTIME FOR FOOT PAIN 30 capsule 5 3/28/2017 at 2200   • Glucosamine 500 MG capsule Take 500 mg by mouth 2 (Two) Times a Day.   4/5/2017 at Unknown time   • glyBURIDE (DIAbeta) 5 MG tablet TAKE 2 TABLETS TWICE DAILY BEFORE MEALS (Patient taking differently: TAKE 2 TABLETS BID) 120 tablet 5 3/28/2017 at 2200   • hydrALAZINE (APRESOLINE) 25 MG tablet Take 1 tablet by mouth 3 (Three) Times a Day. (Patient taking differently: Take 25 mg by mouth 2 (Two) Times a Day.) 90 tablet 3 3/29/2017 at 1000   • lisinopril (PRINIVIL,ZESTRIL) 20 MG tablet TAKE 1 TABLET BY MOUTH EVERY DAY FOR BLOOD PRESSURE 30 tablet 5 3/28/2017 at 1000   • nateglinide (STARLIX) 120 MG tablet TAKE 1 TABLET BY MOUTH BEFORE MEALS (Patient taking differently: daily) 90 tablet 3 3/28/2017 at 1000   • Omega-3 Fatty Acids (FISH OIL) 1000 MG capsule capsule Take 1,000 mg by mouth Daily With Breakfast.   3/28/2017 at 2200   • ONE TOUCH ULTRA TEST test strip USE THREE TIMES DAILY 100 each 11 Taking   • POLY-IRON 150 150 MG capsule TAKE 1 CAPSULE TWICE DAILY 60 capsule 3 3/28/2017 at 2200   • pravastatin (PRAVACHOL) 40 MG tablet Take 1 tablet by mouth Daily. 31 tablet 6 3/28/2017 at 1000   • promethazine (PHENERGAN) 12.5 MG tablet Take 1 tablet by mouth Every 6 (Six) Hours As Needed (prn). 30 tablet 1 More than a month at Unknown time   • SITagliptin (JANUVIA) 100 MG tablet Take 1 tablet by mouth Daily. (Patient taking differently: Take 50 mg by mouth Daily.) 30 tablet 5 3/28/2017 at 1000   • VITAMIN D, ERGOCALCIFEROL, PO Take  by mouth Daily.   3/28/2017 at 1000   • amoxicillin-clavulanate  (AUGMENTIN) 875-125 MG per tablet Take 1 tablet by mouth 2 (Two) Times a Day for 10 days. 20 tablet 0    • [] doxycycline (VIBRAMYCIN) 100 MG capsule Take 1 capsule by mouth Daily for 7 days. 7 capsule 0    • HYDROcodone-acetaminophen (NORCO)  MG per tablet Take 1 tablet by mouth Every 6 (Six) Hours As Needed for Moderate Pain (4-6).      • omeprazole (priLOSEC) 40 MG capsule Take 40 mg by mouth Daily.   3/29/2017 at 1000   • oxyCODONE-acetaminophen (PERCOCET) 7.5-325 MG per tablet Take 1-2 tablets by mouth Every 4 (Four) Hours As Needed (Pain). 15 tablet 0    • pantoprazole (PROTONIX) 40 MG EC tablet Take 40 mg by mouth Daily.   not started    and Allergies:  Review of patient's allergies indicates no known allergies.    Objective     Vital Signs   Temp:  [97 °F (36.1 °C)-97.7 °F (36.5 °C)] 97 °F (36.1 °C)  Heart Rate:  [83-95] 86  Resp:  [20] 20  BP: (130-157)/(63-83) 132/64    Physical Exam:     General Appearance:    Alert, cooperative, in no acute distress   Head:    Normocephalic, without obvious abnormality, atraumatic   Eyes:            Lids and lashes normal, conjunctivae and sclerae normal, no   icterus, no pallor, corneas clear, PERRLA   Ears:    Ears appear intact with no abnormalities noted   Throat:   No oral lesions, no thrush, oral mucosa moist   Lungs:     Clear to auscultation,respirations regular, even and                  unlabored    Heart:    Regular rhythm and normal rate, normal S1 and S2, no            murmur, no gallop, no rub, no click   Abdomen:     Normal bowel sounds, no masses, no organomegaly, soft        non-tender, non-distended, no guarding, no rebound                tenderness   Genitourinary:   normal    Rectal:     deferred    Extremities:   Moves all extremities well, no edema, no cyanosis, no             redness   Pulses:   Pulses palpable and equal bilaterally   Skin:   No bleeding, bruising or rash   Neurologic:   Cranial nerves 2 - 12 grossly intact, sensation  intact, DTR       present and equal bilaterally       Results Review:    Lab Results (last 24 hours)     Procedure Component Value Units Date/Time    S. Pneumo Ag Urine or CSF [[id number]]  (Normal) Collected:  04/05/17 1832    Specimen:  Urine from Urine, Clean Catch Updated:  04/05/17 2002     Strep Pneumo Ag Negative    Legionella Antigen, Urine [[id number]]  (Normal) Collected:  04/05/17 1832    Specimen:  Urine from Urine, Clean Catch Updated:  04/05/17 2002     LEGIONELLA ANTIGEN, URINE Negative    Protime-INR [[id number]]  (Abnormal) Collected:  04/05/17 1712    Specimen:  Blood Updated:  04/05/17 2047     Protime 16.0 (H) Seconds      INR 1.28 (H)    Narrative:       Therapeutic range for most indications is 2.0-3.0 INR,  or 2.5-3.5 for mechanical heart valves.    aPTT [[id number]]  (Normal) Collected:  04/05/17 1712    Specimen:  Blood Updated:  04/05/17 2047     PTT 38.3 seconds     Narrative:       The recommended Heparin therapeutic range is 68-97 seconds.    Urinalysis, Microscopic Only [[id number]]  (Abnormal) Collected:  04/05/17 1832    Specimen:  Urine from Urine, Clean Catch Updated:  04/05/17 2116     RBC, UA 3-5 (A) /HPF      WBC, UA Too Numerous to Count (A) /HPF      Bacteria, UA 3+ (A) /HPF      Squamous Epithelial Cells, UA 3-5 (A) /HPF      Hyaline Casts, UA None Seen /LPF      Methodology Manual Light Microscopy    CK Total & CKMB [[id number]]  (Normal) Collected:  04/05/17 2051    Specimen:  Blood Updated:  04/05/17 2139     CKMB 2.47 ng/mL      Creatine Kinase 75 U/L     Troponin [[id number]]  (Abnormal) Collected:  04/05/17 2051    Specimen:  Blood Updated:  04/05/17 2141     Troponin I 1.280 (C) ng/mL     Extra Tubes [[id number]] Collected:  04/05/17 1712    Specimen:  Blood from Blood, Venous Line Updated:  04/05/17 2202    Narrative:       The following orders were created for panel order Extra Tubes.  Procedure                               Abnormality         Status                     ---------                                -----------         ------                     Light Blue Top[[id number]]                                    Final result               Lavender Top[[id number]]                                      Final result               Gold Top - SST[[id number]]                                    Final result                 Please view results for these tests on the individual orders.    Light Blue Top [[id number]] Collected:  04/05/17 1712    Specimen:  Blood Updated:  04/05/17 2202     Extra Tube hold for add-on      Auto resulted       Lavender Top [[id number]] Collected:  04/05/17 1712    Specimen:  Blood Updated:  04/05/17 2202     Extra Tube hold for add-on      Auto resulted       Gold Top - SST [[id number]] Collected:  04/05/17 1712    Specimen:  Blood Updated:  04/05/17 2202     Extra Tube Hold for add-ons.      Auto resulted.       POC Glucose Fingerstick [[id number]]  (Abnormal) Collected:  04/05/17 2051    Specimen:  Blood Updated:  04/05/17 2353     Glucose 193 (H) mg/dL       RN NotifiedMeter: [id number]: [id number] CHESNEL LISSET       CK Total & CKMB [[id number]]  (Normal) Collected:  04/05/17 2319    Specimen:  Blood Updated:  04/06/17 0046     CKMB 2.45 ng/mL      Creatine Kinase 72 U/L     Troponin [[id number]]  (Abnormal) Collected:  04/05/17 2319    Specimen:  Blood Updated:  04/06/17 0050     Troponin I 1.220 (C) ng/mL     CBC (No Diff) [[id number]]  (Abnormal) Collected:  04/06/17 0245    Specimen:  Blood Updated:  04/06/17 0314     WBC 15.23 (H) 10*3/mm3      RBC 3.77 (L) 10*6/mm3      Hemoglobin 9.0 (L) g/dL      Hematocrit 28.5 (L) %      MCV 75.6 (L) fL      MCH 23.9 (L) pg      MCHC 31.6 g/dL      RDW 15.1 (H) %      RDW-SD 41.9 fl      MPV 8.4 fL      Platelets 267 10*3/mm3     Renal Function Panel [[id number]]  (Abnormal) Collected:  04/06/17 0245    Specimen:  Blood Updated:  04/06/17 0333     Glucose 196 (H) mg/dL      BUN 41 (H) mg/dL      Creatinine 3.10 (H) mg/dL      Sodium  148 (H) mmol/L      Potassium 4.4 mmol/L      Chloride 113 (H) mmol/L      CO2 18.0 (L) mmol/L      Calcium 9.3 mg/dL      Albumin 3.60 g/dL      Phosphorus 3.9 mg/dL      Anion Gap 17.0 (H) mmol/L      BUN/Creatinine Ratio 13.2     eGFR Non African Amer 20 (L) mL/min/1.73     CK Total & CKMB [[id number]]  (Normal) Collected:  04/06/17 0245    Specimen:  Blood Updated:  04/06/17 0346     CKMB 2.53 ng/mL      Creatine Kinase 73 U/L     Troponin [[id number]]  (Abnormal) Collected:  04/06/17 0245    Specimen:  Blood Updated:  04/06/17 0350     Troponin I 1.250 (C) ng/mL     aPTT [[id number]]  (Normal) Collected:  04/06/17 0245    Specimen:  Blood Updated:  04/06/17 0407     PTT 37.5 seconds     Narrative:       The recommended Heparin therapeutic range is 68-97 seconds.    POC Glucose Fingerstick [[id number]]  (Abnormal) Collected:  04/06/17 0623    Specimen:  Blood Updated:  04/06/17 0658     Glucose 182 (H) mg/dL       RN NotifiedMeter: [id number]: [id number] CHESNEL LISSET       aPTT [[id number]]  (Normal) Collected:  04/06/17 0806    Specimen:  Blood Updated:  04/06/17 0852     PTT 39.2 seconds     Narrative:       The recommended Heparin therapeutic range is 68-97 seconds.    Mycoplasma Pneumoniae PCR [[id number]] Collected:  04/05/17 1719    Specimen:  Sputum from Nasopharynx Updated:  04/06/17 0916     MYCOPLASMAE PNEUMONIAE BY PCR Negative    Narrative:       This test is performed by utilizing real time polymerace chain recation (PCR).   This test is performed by utilizing real time polymerace chain recation (PCR).     POC Glucose Fingerstick [[id number]]  (Abnormal) Collected:  04/06/17 1023    Specimen:  Blood Updated:  04/06/17 1035     Glucose 228 (H) mg/dL       RN NotifiedMeter: [id number]: [id number] GRACIELA ISIDORO       aPTT [[id number]]  (Normal) Collected:  04/06/17 1423    Specimen:  Blood Updated:  04/06/17 1501     PTT 32.0 seconds     Narrative:       The recommended Heparin therapeutic  range is 68-97 seconds.    POC Glucose Fingerstick [[id number]]  (Abnormal) Collected:  04/06/17 1632    Specimen:  Blood Updated:  04/06/17 1725     Glucose 214 (H) mg/dL       RN NotifiedMeter: [id number]: [id number] GRACIELA CHAUDHRY       Blood Culture [[id number]]  (Normal) Collected:  04/05/17 1712    Specimen:  Blood from Arm, Left Updated:  04/06/17 1801     Blood Culture No growth at 24 hours    Blood Culture [[id number]]  (Normal) Collected:  04/05/17 1702    Specimen:  Blood from Arm, Right Updated:  04/06/17 1801     Blood Culture No growth at 24 hours         Imaging Results (last 24 hours)     Procedure Component Value Units Date/Time    NM Lung Scan Perfusion Particulate [[id number]] Collected:  04/05/17 2044     Updated:  04/05/17 2048    Narrative:       Patient Name:  INGRID HARRIS  Patient ID:  [id number]   Ordering:  SHERON KELLER  Attending:  SHERON KELLER  Referring:  SHERON KELLER  ------------------------------------------------    Nuclear medicine perfusion lung scan    History: Shortness of breath. Elevated d-dimer.    Correlation: Chest radiograph 4/5/2017.    Following the intravenous administration of 6.6 millicuries of  technetium 99m MAA, multiple perfusion images obtained.    Some inhomogeneous distribution of radionuclide.  No peripheral wedge-shaped perfusion defect to indicate pulmonary  embolism.      Impression:       Conclusion:  Low probability for pulmonary embolism.    [id number]    Electronically signed by:  Gene Fu MD  4/5/2017 8:47 PM CDT  Workstation: [employer] Venous Doppler Lower Extremity Bilateral (duplex) [[id number]] Collected:  04/06/17 1007     Updated:  04/06/17 1010    Narrative:       Patient Name:  INGRID HARRIS  Patient ID:  [id number]   Ordering:  SHERON KELLER  Attending:  RIC GUILLORY  Referring:  SHERON KELLER  ------------------------------------------------  Doppler duplex venous examination bilateral lower  extremities.  CLINICAL INDICATION: Leg swelling.      COMPARISON: None    FINDINGS:    The common femoral,  femoral, and popliteal veins of the  bilateral     lower extremity are well identified and compress  normally.  Doppler signals are heard either spontaneously or on  distal compression.  No evidence of intraluminal thrombus was  noted.     The visualized posterior calf veins are unremarkable.      Impression:       CONCLUSION:  No evidence of deep venous thrombosis in the common  femoral, superficial femoral veins, or popliteal veins of the  bilateral lower extremities.         Electronically signed by:  Leo Kelly MD  4/6/2017 10:09 AM CDT  Workstation: [id number]          I reviewed the patient's new clinical results.  I reviewed the patient's new imaging results and agree with the interpretation.  I reviewed the patient's other test results and agree with the interpretation      Assessment/Plan     Principal Problem:    NSTEMI (non-ST elevated myocardial infarction)  Active Problems:    Type 2 diabetes mellitus    Osteoarthritis of multiple joints    Essential hypertension    Bacterial pneumonia    Morbid obesity with BMI of 50.0-59.9, adult    Acute cystitis      Renal failure and bilateral kidney stones with a right double-J stent recommendations noncontrast CT scan cases been discussed with   I discussed the patients findings and my recommendations with patient.     Blake Lopez MD  04/06/17  7:20 PM

## 2017-04-07 NOTE — PROGRESS NOTES
"Cardiovascular Daily Progress Note  Bharath Dobbins M.D., Ph.D., [facility]      Subjective     Interval History:   Worsening oxygen saturations required nonrebreather and transferred to the CCU    Review of Systems - History obtained from chart review and the patient  Respiratory ROS: positive for - shortness of breath  Cardiovascular ROS: negative for - chest pain    Objective     Vital Sign Min/Max for last 24 hours  Temp  Min: 97 °F (36.1 °C)  Max: 98.2 °F (36.8 °C)   BP  Min: 112/57  Max: 182/84   Pulse  Min: 79  Max: 100   Resp  Min: 19  Max: 28   SpO2  Min: 86 %  Max: 98 %   Flow (L/min)  Min: 3  Max: 15   Weight  Min: 388 lb 0.2 oz (176 kg)  Max: 390 lb 3.4 oz (177 kg)     Flowsheet Rows         First Filed Value    Admission Height  72.99\" (185.4 cm) Documented at 04/06/2017 1542    Admission Weight  (!)  386 lb 6.4 oz (175 kg) Documented at 04/05/2017 1659        Last 3 weights    04/06/17  0500 04/06/17  1542 04/07/17  0402   Weight: (!) 387 lb 3.2 oz (176 kg) (!) 388 lb 0.2 oz (176 kg) (!) 390 lb 3.4 oz (177 kg)     Body mass index is 51.49 kg/(m^2).    Physical Exam:   GEN: alert, appears stated age and cooperative wearing BiPAP  Body Habitus: morbidly obese  HEENT: Head: Normocephalic, no lesions, without obvious abnormality.  Neck / Thyroid: Supple, no masses, nodes, nodules or enlargement. No arcus senilis, xanthelasma or xanthomas.   JVP: 14 cm of water at 45 degrees HJR: Present    Carotid:  Upstroke: easily palpated bilaterally Volume: Normal.    Carotid Bruit:  None  Subclavian Bruit: Not present.    Chest:  Normal Excursion: Good    I:E: Good  Pulmonary:clear to auscultation, no wheezes, rales or rhonchi, symmetric air entry      Precordium:  No palpable heaves or thrusts. P2 is not palpable.   [place]:  normal size and placement Palpable S4: Not present.   Heart rate: normal  Heart Rhythm: regular     Heart Sounds: S1: normal intensity  S2: increased P2  S3: absent   S4: absent  Opening Snap: " absent  A2-OS:  N/A  Pericardial rub: absent    Ejection click: None      Murmurs: Systolic: none  Diastolic: none  Extremity: 1+ edema  Pulses: Right radial artery has 2+ (normal) pulse and Left radial artery has 2+ (normal) pulse         DATA REVIEWED:    Lab Results (last 24 hours)     Procedure Component Value Units Date/Time    aPTT [[id number]]  (Normal) Collected:  04/06/17 0806    Specimen:  Blood Updated:  04/06/17 0852     PTT 39.2 seconds     Narrative:       The recommended Heparin therapeutic range is 68-97 seconds.    Mycoplasma Pneumoniae PCR [[id number]] Collected:  04/05/17 1719    Specimen:  Sputum from Nasopharynx Updated:  04/06/17 0916     MYCOPLASMAE PNEUMONIAE BY PCR Negative    Narrative:       This test is performed by utilizing real time polymerace chain recation (PCR).   This test is performed by utilizing real time polymerace chain recation (PCR).     POC Glucose Fingerstick [[id number]]  (Abnormal) Collected:  04/06/17 1023    Specimen:  Blood Updated:  04/06/17 1035     Glucose 228 (H) mg/dL       RN NotifiedMeter: [id number]: [id number] GRACIELA ISIDORO       aPTT [[id number]]  (Normal) Collected:  04/06/17 1423    Specimen:  Blood Updated:  04/06/17 1501     PTT 32.0 seconds     Narrative:       The recommended Heparin therapeutic range is 68-97 seconds.    POC Glucose Fingerstick [[id number]]  (Abnormal) Collected:  04/06/17 1632    Specimen:  Blood Updated:  04/06/17 1725     Glucose 214 (H) mg/dL       RN NotifiedMeter: [id number]: [id number] GRACIELA ISIDORO       Blood Culture [[id number]]  (Normal) Collected:  04/05/17 1712    Specimen:  Blood from Arm, Left Updated:  04/06/17 1801     Blood Culture No growth at 24 hours    Blood Culture [[id number]]  (Normal) Collected:  04/05/17 1702    Specimen:  Blood from Arm, Right Updated:  04/06/17 1801     Blood Culture No growth at 24 hours    Blood Gas, Arterial [[id number]]  (Abnormal) Collected:  04/06/17 2035    Specimen:  Arterial  Blood Updated:  04/06/17 2046     Site --      Not performed at this site.        Josias's Test --      Not performed at this site.        pH, Arterial 7.353 pH units      pCO2, Arterial 32.1 (L) mm Hg      pO2, Arterial 205.4 (H) mm Hg      HCO3, Arterial 17.4 (L) mmol/L      Base Excess, Arterial -7.2 (L) mmol/L      O2 Saturation, Arterial 99.2 %      Hemoglobin, Blood Gas 10.0 (L) g/dL      Hematocrit, Blood Gas 29.0 (L) %      CO2 Content 18.4 (L)     Sodium, Arterial 144.1 mmol/L      Potassium, Arterial 4.51 mmol/L      Glucose, Arterial 241 mmol/L      Barometric Pressure for Blood Gas -- mmHg       Not performed at this site.        Modality --      Not performed at this site.        Ionized Calcium 4.9 mg/dL     POC Glucose Fingerstick [[id number]]  (Abnormal) Collected:  04/06/17 2037    Specimen:  Blood Updated:  04/06/17 2103     Glucose 229 (H) mg/dL       RN NotifiedMeter: [id number]: [id number] CHESNEL LISSET       aPTT [[id number]]  (Abnormal) Collected:  04/06/17 2139    Specimen:  Blood Updated:  04/06/17 2218     PTT 40.4 (H) seconds     Narrative:       The recommended Heparin therapeutic range is 68-97 seconds.    Urine Culture [[id number]] Collected:  04/05/17 1832    Specimen:  Urine from Urine, Clean Catch Updated:  04/07/17 0602     Urine Culture Culture in progress      Mixed Culture    Narrative:         Specimen contains mixed organisms of questionable pathogenicity which indicates contamination with commensal gricelda.  Further identification is unlikely to provide clinically useful information.  Suggest recollection.    CBC & Differential [[id number]] Collected:  04/07/17 0600    Specimen:  Blood Updated:  04/07/17 0614    Narrative:       The following orders were created for panel order CBC & Differential.  Procedure                               Abnormality         Status                     ---------                               -----------         ------                     Scan  Slide[[id number]]                                                                   CBC Auto Differential[[id number]]         Abnormal            Final result                 Please view results for these tests on the individual orders.    CBC Auto Differential [[id number]]  (Abnormal) Collected:  04/07/17 0600    Specimen:  Blood Updated:  04/07/17 0614     WBC 16.39 (H) 10*3/mm3      RBC 4.00 (L) 10*6/mm3      Hemoglobin 9.4 (L) g/dL      Hematocrit 29.7 (L) %      MCV 74.3 (L) fL      MCH 23.5 (L) pg      MCHC 31.6 g/dL      RDW 15.1 (H) %      RDW-SD 40.6 fl      MPV 8.3 fL      Platelets 261 10*3/mm3      Neutrophil % 82.9 (H) %      Lymphocyte % 4.8 (L) %      Monocyte % 11.5 %      Eosinophil % 0.1 %      Basophil % 0.2 %      Immature Grans % 0.5 %      Neutrophils, Absolute 13.59 (H) 10*3/mm3      Lymphocytes, Absolute 0.79 10*3/mm3      Monocytes, Absolute 1.88 (H) 10*3/mm3      Eosinophils, Absolute 0.02 10*3/mm3      Basophils, Absolute 0.03 10*3/mm3      Immature Grans, Absolute 0.08 (H) 10*3/mm3     Comprehensive Metabolic Panel [[id number]]  (Abnormal) Collected:  04/07/17 0600    Specimen:  Blood Updated:  04/07/17 0621     Glucose 164 (H) mg/dL      BUN 48 (H) mg/dL      Creatinine 3.22 (H) mg/dL      Sodium 148 (H) mmol/L      Potassium 4.4 mmol/L      Chloride 113 (H) mmol/L      CO2 20.0 (L) mmol/L      Calcium 9.0 mg/dL      Total Protein 6.6 g/dL      Albumin 3.40 g/dL      ALT (SGPT) 12 (L) U/L      AST (SGOT) 23 U/L      Alkaline Phosphatase 106 U/L      Total Bilirubin 0.4 mg/dL      eGFR Non African Amer 20 (L) mL/min/1.73      Globulin 3.2 gm/dL      A/G Ratio 1.1 g/dL      BUN/Creatinine Ratio 14.9     Anion Gap 15.0 mmol/L     Phosphorus [[id number]]  (Abnormal) Collected:  04/07/17 0600    Specimen:  Blood Updated:  04/07/17 0624     Phosphorus 4.9 (H) mg/dL     aPTT [[id number]]  (Normal) Collected:  04/07/17 0600    Specimen:  Blood Updated:  04/07/17 0628     PTT 37.7 seconds     Narrative:        The recommended Heparin therapeutic range is 68-97 seconds.    Troponin [[id number]]  (Abnormal) Collected:  04/07/17 0600    Specimen:  Blood Updated:  04/07/17 0639     Troponin I 0.938 (C) ng/mL         Imaging Results (last 24 hours)     Procedure Component Value Units Date/Time    US Venous Doppler Lower Extremity Bilateral (duplex) [[id number]] Collected:  04/06/17 1007     Updated:  04/06/17 1010    Narrative:       Patient Name:  INGRID HARRIS  Patient ID:  [id number]   Ordering:  SHERON KELLER  Attending:  RIC GUILLORY  Referring:  SHERON KELLER  ------------------------------------------------  Doppler duplex venous examination bilateral lower extremities.  CLINICAL INDICATION: Leg swelling.      COMPARISON: None    FINDINGS:    The common femoral,  femoral, and popliteal veins of the  bilateral     lower extremity are well identified and compress  normally.  Doppler signals are heard either spontaneously or on  distal compression.  No evidence of intraluminal thrombus was  noted.     The visualized posterior calf veins are unremarkable.      Impression:       CONCLUSION:  No evidence of deep venous thrombosis in the common  femoral, superficial femoral veins, or popliteal veins of the  bilateral lower extremities.         Electronically signed by:  Leo Kelly MD  4/6/2017 10:09 AM CDT  Workstation: [id number]    XR Chest 1 View [[id number]] Collected:  04/06/17 2132     Updated:  04/06/17 2137    Narrative:       Exam: AP portable chest    INDICATION: Dyspnea    COMPARISON: 4/5/2017    FINDINGS: AP portable chest. Heart size upper limits of normal.  Interval increase in the bilateral airspace opacity and  infiltrates. Pulmonary vascularity is mildly prominent. Cannot  exclude a small left pleural effusion.      Impression:       Interval increase in the bilateral airspace  opacity/infiltrates    Electronically signed by:  Vicente Nascimento MD  4/6/2017 9:36 PM CDT  Workstation: [id number]         · Left Ventricle: Estimated EF appears to be in the range of 51 - 55%  · Global left ventricular wall motion appears abnormal. The left ventricular cavity is moderate-to-severely dilated  · Left ventricular diastolic dysfunction is noted (grade II w/high LAP) consistent with pseudonormalization. Elevated left atrial pressure  · Right Ventricle: Normal wall thickness, systolic function and septal motion noted. Right ventricular cavity is mildly dilated.  · The inferior vena cava is dilated. Normal IVC inspiratory collapse of greater than 50% noted.  · Mild mitral valve regurgitation is present.  · Trace to mild tricuspid valve regurgitation is present.  · Calculated right ventricular systolic pressure from tricuspid regurgitation is 66 mmHg  · Severe pulmonary hypertension is present.  · There is no evidence of pericardial effusion.    LE Duplex  FINDINGS:     The common femoral, femoral, and popliteal veins of the  bilateral lower extremity are well identified and compress  normally. Doppler signals are heard either spontaneously or on  distal compression. No evidence of intraluminal thrombus was  noted.      The visualized posterior calf veins are unremarkable.    Assessment/Plan      1. ACS: NSTEMI. The patient is CP free. EKG is Abnormal, but not acute. He appears to have had a septal infarction prior to his arrival. Most recent troponin is abnormal, but downtrending. CORIN score: elevated. The patient is currently hemodynamically stable. Currently, the patient is not having active CP. I recommended a deferred invasive strategy. He is in BRANDO. Contrast at this point with coronary angiography would likely worsen his renal function.  There are no active CI to full anticoagulation and antiplatelet therapy. The patient's renal function is Abnormal:. We can plan on full medical therapy at this time and plan for a deferred invasive strategy pending nephrology evaluation and improving renal function unless the patient  develops ADHF, electrical or hemodynamic instability, or refractory CP. Please contact me back if any of these develop so that we can consider a more urgent approach. Please notify me of any changes in the patient's stability.   -ASA 81mg; Plavix 75mg PO daily.   -heparin protocol for an additional 24 hours  -Start high-dose Atorvastatin 80mg  -Lopressor  -Deferred consideration for LHC possibly next week pending respiratory and renal function    2. PAH/PVH. WHO Group likely 3.3/3.4; FC: Class 3 - comfortable at rest but have symptoms with less-than-ordinary effort..    RV status: adversely adapted. The patient is currently hypervolemic and perfused physiologic state.  He has had interval right ventricular dilation and increased PA systolic pressure.  His VQ scan is low-probability for PE and certainly was not consistent with CTEPH.  I am more concerned that he likely has GRAHAM/OHS.  However, his PA systolic pressure is out-of-proportion to his disease.  He may have developed CpC-PAH.  As such, he will require RHC.  There is no current indication of right ventricular failure on examination.  It's also very possible that he has developed an element of HFpEF.   -No current indication for PAH-specific medications  -Will plan on RHC next week  -Will need out-pt sleep eval  -Lasix 100mg IV NIKKI and reassess    3. Dyspnea, possibly CAP but also possible due to HFpEF.  Patient clearly has elevated left-sided filling pressures on physical examination.  His LV ejection fraction is preserved.  I am suspicious he has developed an element of HFpEF.  He will require invasive RHC for further clarification of his hemodynamics.  He is also in worsening BRANDO.  While his right ventricle was dilated in his PA pressure is elevated it is beyond the elevation seen in acute pulmonary embolism.  Additionally, I personally reviewed his VQ scan and agreed it is low-probability and not consistent with CTEPH.  -Diuretics, as aboce  -Will plan on  RHC next week  -Antibiotics per primary team  -I will allow another 24 hours of antibiotics but if his clinical condition is not improved I will likely have Dr. Fu see the patient tomorrow for further pulmonary evaluation    Thank you for allowing me to participate in the care of your patient.  If I can be of any further assistance, please do not hesitate contact me.          This document has been electronically signed by Bharath Dobbins MD PhD on April 7, 2017 7:26 AM

## 2017-04-07 NOTE — NURSING NOTE
1915 - Pt was placed on a Venturi mask at %55 by RT with this nurse at bedside.    1935 - Pt desat with %86 SaO2 with Ventrui mask at %55.  Pt placed on nonrebreather mask and RT paged.    1940 - Pt SaO2 at %98 with nonrebreather mask.      1950 - Dr. Warren contacted about pt breathing.  Order received for ICU, lasix ABG.    2020 - Pt transferred to ICU with bedside report given.

## 2017-04-07 NOTE — PROGRESS NOTES
LOS: 2 days   Patient Care Team:  Himanshu Tovar MD as PCP - General (Family Medicine)    Chief Complaint: NSTEMI (non-ST elevated myocardial infarction)    Subjective     Interval History:     Respiratory deterioration last PM. Patient transferred to CCU and placed on CPAP.     Patient Complaints: dyspnea  Patient Denies:  Chest pain  History taken from: patient chart RN    Review of Systems:    Review of Systems   Constitutional: Negative for activity change, appetite change, fatigue and fever.   HENT: Negative for ear pain and sore throat.    Eyes: Negative for pain and visual disturbance.   Respiratory: Positive for shortness of breath. Negative for cough.    Cardiovascular: Positive for leg swelling. Negative for chest pain and palpitations.   Gastrointestinal: Negative for abdominal pain and nausea.   Endocrine: Negative for cold intolerance and heat intolerance.   Genitourinary: Negative for difficulty urinating and dysuria.   Musculoskeletal: Negative for arthralgias and gait problem.   Skin: Negative for color change and rash.   Neurological: Negative for dizziness, weakness and headaches.   Hematological: Negative for adenopathy. Does not bruise/bleed easily.   Psychiatric/Behavioral: Negative for agitation, confusion and sleep disturbance.       Objective     Vital Signs  Temp:  [97 °F (36.1 °C)-99.2 °F (37.3 °C)] 99.2 °F (37.3 °C)  Heart Rate:  [] 97  Resp:  [19-28] 20  BP: (112-182)/(57-96) 155/75    Physical Exam:   Physical Exam   Constitutional: He appears well-developed and well-nourished. He appears lethargic. He has a sickly appearance. He appears ill.   HENT:   Head: Normocephalic and atraumatic.   CPAP mask in place   Eyes: Conjunctivae and EOM are normal. Pupils are equal, round, and reactive to light. Right eye exhibits no discharge. Left eye exhibits no discharge. No scleral icterus.   Neck: Normal range of motion. Neck supple. No JVD present. No tracheal deviation present. No  thyromegaly present.   Cardiovascular: Normal rate, regular rhythm, normal heart sounds and intact distal pulses.  Exam reveals no gallop and no friction rub.    No murmur heard.  Pulmonary/Chest: Effort normal. No stridor. No respiratory distress. He has no wheezes. He has rhonchi in the right upper field, the right middle field, the right lower field, the left upper field, the left middle field and the left lower field. He has rales in the right upper field, the right lower field, the left upper field, the left middle field and the left lower field. He exhibits no tenderness.   Abdominal: Soft. Bowel sounds are normal. He exhibits no distension and no mass. There is no tenderness. There is no rebound and no guarding. No hernia.   Musculoskeletal: He exhibits no edema or deformity.   Lymphadenopathy:     He has no cervical adenopathy.   Neurological: He appears lethargic. He exhibits normal muscle tone. Coordination normal.   Skin: Skin is warm and dry. No erythema.   Nursing note and vitals reviewed.       Results Review:       Lab Results (last 24 hours)     Procedure Component Value Units Date/Time    aPTT [[id number]]  (Normal) Collected:  04/06/17 1423    Specimen:  Blood Updated:  04/06/17 1501     PTT 32.0 seconds     Narrative:       The recommended Heparin therapeutic range is 68-97 seconds.    POC Glucose Fingerstick [[id number]]  (Abnormal) Collected:  04/06/17 1632    Specimen:  Blood Updated:  04/06/17 1725     Glucose 214 (H) mg/dL       RN NotifiedMeter: [id number]: [id number] GRACIELA CHAUDHRY       Blood Culture [[id number]]  (Normal) Collected:  04/05/17 1712    Specimen:  Blood from Arm, Left Updated:  04/06/17 1801     Blood Culture No growth at 24 hours    Blood Culture [[id number]]  (Normal) Collected:  04/05/17 1702    Specimen:  Blood from Arm, Right Updated:  04/06/17 1801     Blood Culture No growth at 24 hours    Blood Gas, Arterial [[id number]]  (Abnormal) Collected:  04/06/17 2035     Specimen:  Arterial Blood Updated:  04/06/17 2046     Site --      Not performed at this site.        Josias's Test --      Not performed at this site.        pH, Arterial 7.353 pH units      pCO2, Arterial 32.1 (L) mm Hg      pO2, Arterial 205.4 (H) mm Hg      HCO3, Arterial 17.4 (L) mmol/L      Base Excess, Arterial -7.2 (L) mmol/L      O2 Saturation, Arterial 99.2 %      Hemoglobin, Blood Gas 10.0 (L) g/dL      Hematocrit, Blood Gas 29.0 (L) %      CO2 Content 18.4 (L)     Sodium, Arterial 144.1 mmol/L      Potassium, Arterial 4.51 mmol/L      Glucose, Arterial 241 mmol/L      Barometric Pressure for Blood Gas -- mmHg       Not performed at this site.        Modality --      Not performed at this site.        Ionized Calcium 4.9 mg/dL     POC Glucose Fingerstick [[id number]]  (Abnormal) Collected:  04/06/17 2037    Specimen:  Blood Updated:  04/06/17 2103     Glucose 229 (H) mg/dL       RN NotifiedMeter: [id number]: [id number] [facility]       aPTT [[id number]]  (Abnormal) Collected:  04/06/17 2139    Specimen:  Blood Updated:  04/06/17 2218     PTT 40.4 (H) seconds     Narrative:       The recommended Heparin therapeutic range is 68-97 seconds.    Urine Culture [[id number]] Collected:  04/05/17 1832    Specimen:  Urine from Urine, Clean Catch Updated:  04/07/17 0602     Urine Culture Culture in progress      Mixed Culture    Narrative:         Specimen contains mixed organisms of questionable pathogenicity which indicates contamination with commensal gricelda.  Further identification is unlikely to provide clinically useful information.  Suggest recollection.    CBC & Differential [[id number]] Collected:  04/07/17 0600    Specimen:  Blood Updated:  04/07/17 0614    Narrative:       The following orders were created for panel order CBC & Differential.  Procedure                               Abnormality         Status                     ---------                               -----------         ------                      Scan Slide[[id number]]                                                                   CBC Auto Differential[[id number]]         Abnormal            Final result                 Please view results for these tests on the individual orders.    CBC Auto Differential [[id number]]  (Abnormal) Collected:  04/07/17 0600    Specimen:  Blood Updated:  04/07/17 0614     WBC 16.39 (H) 10*3/mm3      RBC 4.00 (L) 10*6/mm3      Hemoglobin 9.4 (L) g/dL      Hematocrit 29.7 (L) %      MCV 74.3 (L) fL      MCH 23.5 (L) pg      MCHC 31.6 g/dL      RDW 15.1 (H) %      RDW-SD 40.6 fl      MPV 8.3 fL      Platelets 261 10*3/mm3      Neutrophil % 82.9 (H) %      Lymphocyte % 4.8 (L) %      Monocyte % 11.5 %      Eosinophil % 0.1 %      Basophil % 0.2 %      Immature Grans % 0.5 %      Neutrophils, Absolute 13.59 (H) 10*3/mm3      Lymphocytes, Absolute 0.79 10*3/mm3      Monocytes, Absolute 1.88 (H) 10*3/mm3      Eosinophils, Absolute 0.02 10*3/mm3      Basophils, Absolute 0.03 10*3/mm3      Immature Grans, Absolute 0.08 (H) 10*3/mm3     Comprehensive Metabolic Panel [[id number]]  (Abnormal) Collected:  04/07/17 0600    Specimen:  Blood Updated:  04/07/17 0621     Glucose 164 (H) mg/dL      BUN 48 (H) mg/dL      Creatinine 3.22 (H) mg/dL      Sodium 148 (H) mmol/L      Potassium 4.4 mmol/L      Chloride 113 (H) mmol/L      CO2 20.0 (L) mmol/L      Calcium 9.0 mg/dL      Total Protein 6.6 g/dL      Albumin 3.40 g/dL      ALT (SGPT) 12 (L) U/L      AST (SGOT) 23 U/L      Alkaline Phosphatase 106 U/L      Total Bilirubin 0.4 mg/dL      eGFR Non African Amer 20 (L) mL/min/1.73      Globulin 3.2 gm/dL      A/G Ratio 1.1 g/dL      BUN/Creatinine Ratio 14.9     Anion Gap 15.0 mmol/L     Phosphorus [[id number]]  (Abnormal) Collected:  04/07/17 0600    Specimen:  Blood Updated:  04/07/17 0624     Phosphorus 4.9 (H) mg/dL     aPTT [[id number]]  (Normal) Collected:  04/07/17 0600    Specimen:  Blood Updated:  04/07/17 0628     PTT 37.7 seconds      Narrative:       The recommended Heparin therapeutic range is 68-97 seconds.    Troponin [[id number]]  (Abnormal) Collected:  04/07/17 0600    Specimen:  Blood Updated:  04/07/17 0639     Troponin I 0.938 (C) ng/mL     Potassium [[id number]]  (Normal) Collected:  04/07/17 0930    Specimen:  Blood Updated:  04/07/17 1002     Potassium 4.4 mmol/L         · Left Ventricle: Estimated EF appears to be in the range of 51 - 55%  · Global left ventricular wall motion appears abnormal. The left ventricular cavity is moderate-to-severely dilated  · Left ventricular diastolic dysfunction is noted (grade II w/high LAP) consistent with pseudonormalization. Elevated left atrial pressure  · Right Ventricle: Normal wall thickness, systolic function and septal motion noted. Right ventricular cavity is mildly dilated.  · The inferior vena cava is dilated. Normal IVC inspiratory collapse of greater than 50% noted.  · Mild mitral valve regurgitation is present.  · Trace to mild tricuspid valve regurgitation is present.  · Calculated right ventricular systolic pressure from tricuspid regurgitation is 66 mmHg  · Severe pulmonary hypertension is present.  · There is no evidence of pericardial effusion.      Exam: AP portable chest     INDICATION: Dyspnea     COMPARISON: 4/5/2017     FINDINGS: AP portable chest. Heart size upper limits of normal.  Interval increase in the bilateral airspace opacity and  infiltrates. Pulmonary vascularity is mildly prominent. Cannot  exclude a small left pleural effusion.     IMPRESSION:  Interval increase in the bilateral airspace  opacity/infiltrates     Electronically signed by: Vicente Nascimento MD 4/6/2017 9:36 PM CDT  Workstation: [id number]      Medication Review:   Current Facility-Administered Medications   Medication Dose Route Frequency Provider Last Rate Last Dose   • atorvastatin (LIPITOR) tablet 80 mg  80 mg Oral Nightly Bharath Dobbins MD PhD   80 mg at 04/06/17 2133   • clopidogrel  (PLAVIX) tablet 75 mg  75 mg Oral Daily Bharath Dobbins MD PhD   75 mg at 04/07/17 0909   • dextrose (D50W) solution 25 g  25 g Intravenous Q15 Min PRN Hayley Valderrama MD       • dextrose (GLUTOSE) oral gel 15 g  15 g Oral Q15 Min PRN Hayley Valderrama MD       • gabapentin (NEURONTIN) capsule 100 mg  100 mg Oral Nightly Hayley Valderrama MD   100 mg at 04/06/17 2134   • glucagon (human recombinant) (GLUCAGEN DIAGNOSTIC) injection 1 mg  1 mg Subcutaneous Q15 Min PRN Hayley Valderrama MD       • heparin infusion 100 units/mL in 0.45% NaCl  1,000 Units/hr Intravenous Titrated Hayley Valderrama MD 18 mL/hr at 04/07/17 0645 1,800 Units/hr at 04/07/17 0645    And   • heparin (porcine) 5000 UNIT/ML injection 3,000 Units  3,000 Units Intravenous PRN Hayley Valderrama MD   3,000 Units at 04/07/17 0650    And   • heparin (porcine) 5000 UNIT/ML injection 2,000 Units  2,000 Units Intravenous PRN Hayley Valderrama MD       • hydrALAZINE (APRESOLINE) tablet 25 mg  25 mg Oral BID Hayley Valderrama MD   25 mg at 04/06/17 1803   • HYDROcodone-acetaminophen (NORCO)  MG per tablet 1 tablet  1 tablet Oral Q6H PRN Hayley Valderrama MD   1 tablet at 04/06/17 0728   • insulin aspart (novoLOG) injection 0-7 Units  0-7 Units Subcutaneous 4x Daily AC & at Bedtime Hayley Valderrama MD   2 Units at 04/07/17 0653   • magic butt ointment   Topical BID Hayley Valderrama MD       • metoprolol tartrate (LOPRESSOR) tablet 25 mg  25 mg Oral Q12H Bharath Dobbins MD PhD   25 mg at 04/06/17 2133   • pantoprazole (PROTONIX) EC tablet 40 mg  40 mg Oral Daily Hayley Valderrama MD   40 mg at 04/07/17 0911   • piperacillin-tazobactam (ZOSYN) in iso-osmotic dextrose IVPB 2.25 g (premix)  2.25 g Intravenous Q6H Alanna Brand MD 0 mL/hr at 04/06/17 1854 2.25 g at 04/07/17 0619    And   • Pharmacy to dose vancomycin   Does not apply Continuous PRN Alanna Brand MD       • SITagliptin (JANUVIA) tablet 50 mg  50 mg Oral Daily Hayley Valderrama MD   50 mg at 04/07/17 0909   •  sodium chloride 0.9 % flush 1-10 mL  1-10 mL Intravenous PRN Hayley Valderrama MD       • vancomycin (VANCOCIN) 2,000 mg in sodium chloride 0.9 % 500 mL IVPB  2,000 mg Intravenous Q24H Hayley Valderrama MD   2,000 mg at 04/07/17 0240       Assessment/Plan     Principal Problem:    NSTEMI (non-ST elevated myocardial infarction)  Active Problems:    Type 2 diabetes mellitus    Osteoarthritis of multiple joints    Essential hypertension    Bacterial pneumonia    Morbid obesity with BMI of 50.0-59.9, adult    Acute cystitis    Plan    1. NSTEMI -- medical management for now due to BRANDO on CKD. Trop trending down.  2. Pneumonia clinically and per CXR. Deteriorated on Vancomycin and Zosyn. Will add Levaquin  3. BRANDO -- Nephrology following  4. UTI urine culture inconclusive. Lab recommends repeat. Will repeat UA with culture.          This document has been electronically signed by Javed Childers DO on April 7, 2017 10:50 AM

## 2017-04-08 ENCOUNTER — APPOINTMENT (OUTPATIENT)
Dept: GENERAL RADIOLOGY | Facility: HOSPITAL | Age: 62
End: 2017-04-08

## 2017-04-08 PROBLEM — I50.30 HEART FAILURE WITH PRESERVED EJECTION FRACTION, BORDERLINE, CLASS III (HCC): Status: ACTIVE | Noted: 2017-04-07

## 2017-04-08 PROBLEM — I27.20 PULMONARY HYPERTENSION (HCC): Status: ACTIVE | Noted: 2017-04-07

## 2017-04-08 LAB
ALBUMIN SERPL-MCNC: 2.9 G/DL (ref 3.4–4.8)
ANION GAP SERPL CALCULATED.3IONS-SCNC: 13 MMOL/L (ref 5–15)
APTT PPP: 51.9 SECONDS (ref 20–40.3)
APTT PPP: 62.1 SECONDS (ref 20–40.3)
ARTERIAL PATENCY WRIST A: ABNORMAL
ATMOSPHERIC PRESS: ABNORMAL MMHG
BASE EXCESS BLDA CALC-SCNC: -6 MMOL/L (ref -2.4–2.4)
BDY SITE: ABNORMAL
BUN BLD-MCNC: 54 MG/DL (ref 7–21)
BUN/CREAT SERPL: 16.1 (ref 7–25)
CA-I BLD-MCNC: 4.7 MG/DL (ref 4.5–4.9)
CALCIUM SPEC-SCNC: 8.4 MG/DL (ref 8.4–10.2)
CHLORIDE SERPL-SCNC: 113 MMOL/L (ref 95–110)
CO2 BLDA-SCNC: 20.2 MMOL/L (ref 23–27)
CO2 SERPL-SCNC: 20 MMOL/L (ref 22–31)
CREAT BLD-MCNC: 3.36 MG/DL (ref 0.7–1.3)
DEPRECATED RDW RBC AUTO: 42.6 FL (ref 35.1–43.9)
ERYTHROCYTE [DISTWIDTH] IN BLOOD BY AUTOMATED COUNT: 15.1 % (ref 11.5–14.5)
GFR SERPL CREATININE-BSD FRML MDRD: 19 ML/MIN/1.73 (ref 49–113)
GLUCOSE BLD-MCNC: 104 MG/DL (ref 60–100)
GLUCOSE BLDA-MCNC: 108 MMOL/L
GLUCOSE BLDC GLUCOMTR-MCNC: 113 MG/DL (ref 70–130)
GLUCOSE BLDC GLUCOMTR-MCNC: 156 MG/DL (ref 70–130)
GLUCOSE BLDC GLUCOMTR-MCNC: 182 MG/DL (ref 70–130)
GLUCOSE BLDC GLUCOMTR-MCNC: 243 MG/DL (ref 70–130)
HCO3 BLDA-SCNC: 19.1 MMOL/L (ref 22–26)
HCT VFR BLD AUTO: 23.2 % (ref 39–49)
HCT VFR BLD CALC: 24 % (ref 40–54)
HGB BLD-MCNC: 7.2 G/DL (ref 13.7–17.3)
HGB BLDA-MCNC: 8.2 G/DL (ref 14–18)
L PNEUMO1 AG UR QL IA: NEGATIVE
MCH RBC QN AUTO: 23.5 PG (ref 26.5–34)
MCHC RBC AUTO-ENTMCNC: 31 G/DL (ref 31.5–36.3)
MCV RBC AUTO: 75.8 FL (ref 80–98)
MODALITY: ABNORMAL
PCO2 BLDA: 35.9 MM HG (ref 35–45)
PH BLDA: 7.34 PH UNITS (ref 7.35–7.45)
PHOSPHATE SERPL-MCNC: 5.1 MG/DL (ref 2.4–4.4)
PLATELET # BLD AUTO: 213 10*3/MM3 (ref 150–450)
PMV BLD AUTO: 8.2 FL (ref 8–12)
PO2 BLDA: 149 MM HG (ref 80–105)
POTASSIUM BLD-SCNC: 4.2 MMOL/L (ref 3.5–5.1)
POTASSIUM BLDA-SCNC: 4.12 MMOL/L (ref 3.6–4.9)
RBC # BLD AUTO: 3.06 10*6/MM3 (ref 4.37–5.74)
S PNEUM AG SPEC QL LA: NEGATIVE
SAO2 % BLDCOA: 98.9 %
SODIUM BLD-SCNC: 146 MMOL/L (ref 137–145)
SODIUM BLDA-SCNC: 143.9 MMOL/L (ref 138–146)
VANCOMYCIN TROUGH SERPL-MCNC: 16.84 MCG/ML (ref 10–15)
WBC NRBC COR # BLD: 11.96 10*3/MM3 (ref 3.2–9.8)

## 2017-04-08 PROCEDURE — 99233 SBSQ HOSP IP/OBS HIGH 50: CPT | Performed by: INTERNAL MEDICINE

## 2017-04-08 PROCEDURE — 94660 CPAP INITIATION&MGMT: CPT

## 2017-04-08 PROCEDURE — 63710000001 INSULIN ASPART PER 5 UNITS: Performed by: FAMILY MEDICINE

## 2017-04-08 PROCEDURE — 94799 UNLISTED PULMONARY SVC/PX: CPT

## 2017-04-08 PROCEDURE — 87449 NOS EACH ORGANISM AG IA: CPT | Performed by: INTERNAL MEDICINE

## 2017-04-08 PROCEDURE — 36600 WITHDRAWAL OF ARTERIAL BLOOD: CPT

## 2017-04-08 PROCEDURE — 71010 HC CHEST PA OR AP: CPT

## 2017-04-08 PROCEDURE — 25010000002 HEPARIN (PORCINE) PER 1000 UNITS: Performed by: FAMILY MEDICINE

## 2017-04-08 PROCEDURE — 25010000002 PIPERACILLIN SOD-TAZOBACTAM PER 1 G: Performed by: INTERNAL MEDICINE

## 2017-04-08 PROCEDURE — 85027 COMPLETE CBC AUTOMATED: CPT | Performed by: FAMILY MEDICINE

## 2017-04-08 PROCEDURE — 85730 THROMBOPLASTIN TIME PARTIAL: CPT | Performed by: FAMILY MEDICINE

## 2017-04-08 PROCEDURE — 25010000002 VANCOMYCIN PER 500 MG: Performed by: FAMILY MEDICINE

## 2017-04-08 PROCEDURE — 94640 AIRWAY INHALATION TREATMENT: CPT

## 2017-04-08 PROCEDURE — 80202 ASSAY OF VANCOMYCIN: CPT | Performed by: FAMILY MEDICINE

## 2017-04-08 PROCEDURE — 82803 BLOOD GASES ANY COMBINATION: CPT | Performed by: FAMILY MEDICINE

## 2017-04-08 PROCEDURE — 82962 GLUCOSE BLOOD TEST: CPT

## 2017-04-08 PROCEDURE — 25010000002 FUROSEMIDE PER 20 MG: Performed by: INTERNAL MEDICINE

## 2017-04-08 PROCEDURE — 99291 CRITICAL CARE FIRST HOUR: CPT | Performed by: INTERNAL MEDICINE

## 2017-04-08 PROCEDURE — 25010000002 HEPARIN (PORCINE) PER 1000 UNITS: Performed by: INTERNAL MEDICINE

## 2017-04-08 PROCEDURE — 25010000002 LEVOFLOXACIN PER 250 MG: Performed by: FAMILY MEDICINE

## 2017-04-08 PROCEDURE — 94760 N-INVAS EAR/PLS OXIMETRY 1: CPT

## 2017-04-08 PROCEDURE — 80069 RENAL FUNCTION PANEL: CPT | Performed by: FAMILY MEDICINE

## 2017-04-08 PROCEDURE — 87899 AGENT NOS ASSAY W/OPTIC: CPT | Performed by: INTERNAL MEDICINE

## 2017-04-08 RX ORDER — HEPARIN SODIUM 5000 [USP'U]/ML
5000 INJECTION, SOLUTION INTRAVENOUS; SUBCUTANEOUS EVERY 8 HOURS SCHEDULED
Status: DISCONTINUED | OUTPATIENT
Start: 2017-04-08 | End: 2017-04-14 | Stop reason: HOSPADM

## 2017-04-08 RX ORDER — FUROSEMIDE 10 MG/ML
100 INJECTION INTRAMUSCULAR; INTRAVENOUS ONCE
Status: COMPLETED | OUTPATIENT
Start: 2017-04-08 | End: 2017-04-08

## 2017-04-08 RX ORDER — IPRATROPIUM BROMIDE AND ALBUTEROL SULFATE 2.5; .5 MG/3ML; MG/3ML
3 SOLUTION RESPIRATORY (INHALATION)
Status: DISCONTINUED | OUTPATIENT
Start: 2017-04-08 | End: 2017-04-12

## 2017-04-08 RX ADMIN — VANCOMYCIN HYDROCHLORIDE 2000 MG: 1 INJECTION, POWDER, LYOPHILIZED, FOR SOLUTION INTRAVENOUS at 03:41

## 2017-04-08 RX ADMIN — SITAGLIPTIN 50 MG: 25 TABLET, FILM COATED ORAL at 08:54

## 2017-04-08 RX ADMIN — HEPARIN SODIUM 5000 UNITS: 5000 INJECTION, SOLUTION INTRAVENOUS; SUBCUTANEOUS at 10:00

## 2017-04-08 RX ADMIN — GABAPENTIN 100 MG: 100 CAPSULE ORAL at 20:43

## 2017-04-08 RX ADMIN — IPRATROPIUM BROMIDE AND ALBUTEROL SULFATE 3 ML: 2.5; .5 SOLUTION RESPIRATORY (INHALATION) at 14:33

## 2017-04-08 RX ADMIN — HYDROCODONE BITARTRATE AND ACETAMINOPHEN 1 TABLET: 10; 325 TABLET ORAL at 20:42

## 2017-04-08 RX ADMIN — TAZOBACTAM SODIUM AND PIPERACILLIN SODIUM 2.25 G: 250; 2 INJECTION, SOLUTION INTRAVENOUS at 18:42

## 2017-04-08 RX ADMIN — HEPARIN SODIUM 5000 UNITS: 5000 INJECTION, SOLUTION INTRAVENOUS; SUBCUTANEOUS at 23:32

## 2017-04-08 RX ADMIN — HYDRALAZINE HYDROCHLORIDE 25 MG: 25 TABLET ORAL at 08:54

## 2017-04-08 RX ADMIN — TAZOBACTAM SODIUM AND PIPERACILLIN SODIUM 2.25 G: 250; 2 INJECTION, SOLUTION INTRAVENOUS at 12:24

## 2017-04-08 RX ADMIN — LEVOFLOXACIN 500 MG: 5 INJECTION, SOLUTION INTRAVENOUS at 15:30

## 2017-04-08 RX ADMIN — Medication: at 10:00

## 2017-04-08 RX ADMIN — TAZOBACTAM SODIUM AND PIPERACILLIN SODIUM 2.25 G: 250; 2 INJECTION, SOLUTION INTRAVENOUS at 23:33

## 2017-04-08 RX ADMIN — METOPROLOL TARTRATE 25 MG: 25 TABLET ORAL at 20:43

## 2017-04-08 RX ADMIN — INSULIN ASPART 2 UNITS: 100 INJECTION, SOLUTION INTRAVENOUS; SUBCUTANEOUS at 20:54

## 2017-04-08 RX ADMIN — HYDRALAZINE HYDROCHLORIDE 25 MG: 25 TABLET ORAL at 18:43

## 2017-04-08 RX ADMIN — IPRATROPIUM BROMIDE AND ALBUTEROL SULFATE 3 ML: 2.5; .5 SOLUTION RESPIRATORY (INHALATION) at 19:08

## 2017-04-08 RX ADMIN — IPRATROPIUM BROMIDE AND ALBUTEROL SULFATE 3 ML: 2.5; .5 SOLUTION RESPIRATORY (INHALATION) at 10:52

## 2017-04-08 RX ADMIN — ATORVASTATIN CALCIUM 80 MG: 40 TABLET, FILM COATED ORAL at 20:43

## 2017-04-08 RX ADMIN — INSULIN ASPART 2 UNITS: 100 INJECTION, SOLUTION INTRAVENOUS; SUBCUTANEOUS at 12:25

## 2017-04-08 RX ADMIN — HEPARIN SODIUM 2000 UNITS: 5000 INJECTION, SOLUTION INTRAVENOUS; SUBCUTANEOUS at 00:19

## 2017-04-08 RX ADMIN — Medication: at 18:43

## 2017-04-08 RX ADMIN — PANTOPRAZOLE SODIUM 40 MG: 40 TABLET, DELAYED RELEASE ORAL at 08:54

## 2017-04-08 RX ADMIN — FUROSEMIDE 100 MG: 10 INJECTION, SOLUTION INTRAVENOUS at 09:59

## 2017-04-08 RX ADMIN — HEPARIN SODIUM 5000 UNITS: 5000 INJECTION, SOLUTION INTRAVENOUS; SUBCUTANEOUS at 15:30

## 2017-04-08 RX ADMIN — METOPROLOL TARTRATE 25 MG: 25 TABLET ORAL at 08:54

## 2017-04-08 RX ADMIN — CLOPIDOGREL BISULFATE 75 MG: 75 TABLET ORAL at 08:54

## 2017-04-08 RX ADMIN — INSULIN ASPART 3 UNITS: 100 INJECTION, SOLUTION INTRAVENOUS; SUBCUTANEOUS at 18:46

## 2017-04-08 NOTE — PROGRESS NOTES
LOS: 3 days   Patient Care Team:  Himanshu Tovar MD as PCP - General (Family Medicine)    Chief Complaint: NSTEMI (non-ST elevated myocardial infarction)    Subjective      Feeling better this am. No chest pain. Dyspnea improved somewhat. Has an appetite and able to eat breakfast.    Patient Complaints: dyspnea  Patient Denies:  Chest pain  History taken from: patient chart RN    Review of Systems:    Review of Systems   Constitutional: Positive for activity change, chills and fatigue. Negative for appetite change and fever.   HENT: Negative for ear pain and sore throat.    Eyes: Negative for pain and visual disturbance.   Respiratory: Positive for cough and shortness of breath.    Cardiovascular: Positive for leg swelling. Negative for chest pain and palpitations.   Gastrointestinal: Negative for abdominal pain, nausea and vomiting.   Genitourinary: Negative for difficulty urinating and dysuria.   Musculoskeletal: Negative for arthralgias and gait problem.   Skin: Negative for color change and rash.   Neurological: Negative for dizziness, weakness and headaches.   Hematological: Negative for adenopathy. Does not bruise/bleed easily.   Psychiatric/Behavioral: Negative for agitation, confusion and sleep disturbance.       Objective     Vital Signs  Temp:  [99 °F (37.2 °C)-99.7 °F (37.6 °C)] 99.1 °F (37.3 °C)  Heart Rate:  [75-97] 76  Resp:  [18-23] 18  BP: (107-145)/(55-78) 126/60    Physical Exam:   Physical Exam   Constitutional: He is oriented to person, place, and time. He appears well-developed and well-nourished.   HENT:   Head: Normocephalic and atraumatic.   Eyes: EOM are normal. Pupils are equal, round, and reactive to light.   Neck: Normal range of motion. Neck supple.   Cardiovascular: Normal rate.    Pulmonary/Chest: Effort normal. No respiratory distress. He has no wheezes. He has rales.   Abdominal: Soft. Bowel sounds are normal. He exhibits no distension. There is no tenderness.   Neurological: He  is alert and oriented to person, place, and time.   Skin: Skin is warm and dry.        Results Review:       Lab Results (last 24 hours)     Procedure Component Value Units Date/Time    Potassium [[id number]]  (Normal) Collected:  04/07/17 0930    Specimen:  Blood Updated:  04/07/17 1002     Potassium 4.4 mmol/L     Urine Culture [[id number]] Collected:  04/07/17 1102    Specimen:  Urine from Urine, Catheter Updated:  04/07/17 1127    Urinalysis With / Culture If Indicated [[id number]]  (Abnormal) Collected:  04/07/17 1102    Specimen:  Urine from Urine, Catheter Updated:  04/07/17 1154     Color, UA Yellow     Appearance, UA Turbid (A)     pH, UA <=5.0     Specific Gravity, UA 1.008     Glucose, UA Negative     Ketones, UA Negative     Bilirubin, UA Negative     Blood, UA Large (3+) (A)     Protein, UA Trace (A)     Leuk Esterase, UA Large (3+) (A)     Nitrite, UA Negative     Urobilinogen, UA 0.2 E.U./dL    Urinalysis, Microscopic Only [[id number]]  (Abnormal) Collected:  04/07/17 1102    Specimen:  Urine from Urine, Catheter Updated:  04/07/17 1214     RBC, UA Too Numerous to Count (A) /HPF      WBC, UA Too Numerous to Count (A) /HPF      Bacteria, UA 1+ (A) /HPF      Squamous Epithelial Cells, UA 6-12 (A) /HPF      Hyaline Casts, UA Unable to Determine /LPF      Fine Granular Casts, UA -- /LPF      Methodology Manual Light Microscopy    aPTT [[id number]]  (Abnormal) Collected:  04/07/17 1402    Specimen:  Blood Updated:  04/07/17 1431     PTT 47.1 (H) seconds     Narrative:       The recommended Heparin therapeutic range is 68-97 seconds.    POC Glucose Fingerstick [[id number]]  (Abnormal) Collected:  04/07/17 1106    Specimen:  Blood Updated:  04/07/17 1757     Glucose 135 (H) mg/dL       Meter: [id number]: [id number] ROLA FISCHER       Blood Culture [[id number]]  (Normal) Collected:  04/05/17 1712    Specimen:  Blood from Arm, Left Updated:  04/07/17 1801     Blood Culture No growth at 2 days    Blood Culture  [[id number]]  (Normal) Collected:  04/05/17 1702    Specimen:  Blood from Arm, Right Updated:  04/07/17 1801     Blood Culture No growth at 2 days    POC Glucose Fingerstick [[id number]]  (Normal) Collected:  04/07/17 1632    Specimen:  Blood Updated:  04/07/17 1921     Glucose 127 mg/dL       Meter: [id number]: [id number] ROLA FISCHER       POC Glucose Fingerstick [[id number]]  (Normal) Collected:  04/07/17 2030    Specimen:  Blood Updated:  04/07/17 2049     Glucose 115 mg/dL       Meter: [id number]: [id number] ELSI MEZA       aPTT [[id number]]  (Abnormal) Collected:  04/07/17 2230    Specimen:  Blood Updated:  04/07/17 2307     PTT 48.5 (H) seconds     Narrative:       The recommended Heparin therapeutic range is 68-97 seconds.    CBC (No Diff) [[id number]]  (Abnormal) Collected:  04/08/17 0216    Specimen:  Blood Updated:  04/08/17 0259     WBC 11.96 (H) 10*3/mm3      RBC 3.06 (L) 10*6/mm3      Hemoglobin 7.2 (L) g/dL      Hematocrit 23.2 (L) %      MCV 75.8 (L) fL      MCH 23.5 (L) pg      MCHC 31.0 (L) g/dL      RDW 15.1 (H) %      RDW-SD 42.6 fl      MPV 8.2 fL      Platelets 213 10*3/mm3     Renal Function Panel [[id number]]  (Abnormal) Collected:  04/08/17 0216    Specimen:  Blood Updated:  04/08/17 0306     Glucose 104 (H) mg/dL      BUN 54 (H) mg/dL      Creatinine 3.36 (H) mg/dL      Sodium 146 (H) mmol/L      Potassium 4.2 mmol/L      Chloride 113 (H) mmol/L      CO2 20.0 (L) mmol/L      Calcium 8.4 mg/dL      Albumin 2.90 (L) g/dL      Phosphorus 5.1 (H) mg/dL      Anion Gap 13.0 mmol/L      BUN/Creatinine Ratio 16.1     eGFR Non African Amer 19 (L) mL/min/1.73     aPTT [[id number]]  (Abnormal) Collected:  04/08/17 0216    Specimen:  Blood Updated:  04/08/17 0312     PTT 62.1 (H) seconds     Narrative:       The recommended Heparin therapeutic range is 68-97 seconds.    Vancomycin, Trough [[id number]]  (Abnormal) Collected:  04/08/17 0210    Specimen:  Blood Updated:  04/08/17 032      Vancomycin Trough 16.84 (H) mcg/mL     POC Glucose Fingerstick [[id number]]  (Normal) Collected:  04/08/17 0526    Specimen:  Blood Updated:  04/08/17 0538     Glucose 113 mg/dL       Meter: [id number]: [id number] ELSI MEZA       Blood Gas, Arterial [[id number]]  (Abnormal) Collected:  04/08/17 0542    Specimen:  Arterial Blood Updated:  04/08/17 0559     Site --      Not performed at this site.        Josias's Test --      Not performed at this site.        pH, Arterial 7.344 (L) pH units      pCO2, Arterial 35.9 mm Hg      pO2, Arterial 149.0 (H) mm Hg      HCO3, Arterial 19.1 (L) mmol/L      Base Excess, Arterial -6.0 (L) mmol/L      O2 Saturation, Arterial 98.9 %      Hemoglobin, Blood Gas 8.2 (L) g/dL      Hematocrit, Blood Gas 24.0 (L) %      CO2 Content 20.2 (L)     Sodium, Arterial 143.9 mmol/L      Potassium, Arterial 4.12 mmol/L      Glucose, Arterial 108 mmol/L      Barometric Pressure for Blood Gas -- mmHg       Not performed at this site.        Modality --      Not performed at this site.        Ionized Calcium 4.7 mg/dL     aPTT [[id number]]  (Abnormal) Collected:  04/08/17 0636    Specimen:  Blood Updated:  04/08/17 0718     PTT 51.9 (H) seconds     Narrative:       The recommended Heparin therapeutic range is 68-97 seconds.        Exam: AP portable chest     INDICATION: Pneumonia     COMPARISON: 4/6/2016     FINDINGS: AP portable chest. Heart size upper limits normal. No  change in bilateral airspace opacity. Pulmonary vascularity is  mildly prominent. Cannot exclude a small  Left pleural effusion.     IMPRESSION:  No significant interval change in bilateral airspace  opacity     Electronically signed by: Vicente Nascimento MD 4/8/2017 5:55 AM CDT  Workstation: [id number]    .Results for INGRID HARRIS (MRN [id number]) as of 4/8/2017 08:24   Ref. Range 4/8/2017 05:42   pH, Arterial Latest Ref Range: 7.350 - 7.450 pH units 7.344 (L)   pCO2, Arterial Latest Ref Range: 35.0 - 45.0  mm Hg 35.9   pO2, Arterial Latest Ref Range: 80.0 - 105.0 mm Hg 149.0 (H)   HCO3, Arterial Latest Ref Range: 22.0 - 26.0 mmol/L 19.1 (L)   Base Excess, Arterial Latest Ref Range: -2.4 - 2.4 mmol/L -6.0 (L)   O2 Saturation, Arterial Latest Units: % 98.9   Hemoglobin, Blood Gas Latest Ref Range: 14 - 18 g/dL 8.2 (L)   Hematocrit, Blood Gas Latest Ref Range: 40.0 - 54.0 % 24.0 (L)   Sodium, Arterial Latest Ref Range: 138 - 146 mmol/L 143.9   Potassium, Arterial Latest Ref Range: 3.6 - 4.9 mmol/L 4.12   Glucose, Arterial Latest Units: mmol/L 108   Barometric Pressure for Blood Gas Latest Units: mmHg Pend       Medication Review:   Current Facility-Administered Medications   Medication Dose Route Frequency Provider Last Rate Last Dose   • atorvastatin (LIPITOR) tablet 80 mg  80 mg Oral Nightly Bharath Dobbins MD PhD   80 mg at 04/07/17 2031   • clopidogrel (PLAVIX) tablet 75 mg  75 mg Oral Daily Bharath Dobbins MD PhD   75 mg at 04/07/17 0909   • dextrose (D50W) solution 25 g  25 g Intravenous Q15 Min PRN Hayley Valderrama MD       • dextrose (GLUTOSE) oral gel 15 g  15 g Oral Q15 Min PRN Hayley Valderrama MD       • furosemide (LASIX) injection 100 mg  100 mg Intravenous Once Bharath Dobbins MD PhD       • gabapentin (NEURONTIN) capsule 100 mg  100 mg Oral Nightly Hayley Valderrama MD   100 mg at 04/07/17 2031   • glucagon (human recombinant) (GLUCAGEN DIAGNOSTIC) injection 1 mg  1 mg Subcutaneous Q15 Min PRN Hayley Valderrama MD       • heparin (porcine) 5000 UNIT/ML injection 5,000 Units  5,000 Units Subcutaneous Q8H Bharath Dobbins MD PhD       • hydrALAZINE (APRESOLINE) tablet 25 mg  25 mg Oral BID Hayley Valderrama MD   25 mg at 04/06/17 1803   • HYDROcodone-acetaminophen (NORCO)  MG per tablet 1 tablet  1 tablet Oral Q6H PRN Hayley Valderrama MD   1 tablet at 04/06/17 0728   • insulin aspart (novoLOG) injection 0-7 Units  0-7 Units Subcutaneous 4x Daily AC & at Bedtime Hayley Valderrama MD   2 Units  at 04/07/17 0653   • ipratropium-albuterol (DUO-NEB) nebulizer solution 3 mL  3 mL Nebulization 4x Daily - RT Abby Fu MD       • levoFLOXacin (LEVAQUIN) 500 mg/100 mL D5W (premix) (LEVAQUIN) 500 mg  500 mg Intravenous Q24H Javed Childers DO   500 mg at 04/07/17 1121   • magic butt ointment   Topical BID Hayley Valderrama MD       • metoprolol tartrate (LOPRESSOR) tablet 25 mg  25 mg Oral Q12H Bharath Dobbins MD PhD   25 mg at 04/07/17 2031   • pantoprazole (PROTONIX) EC tablet 40 mg  40 mg Oral Daily Hayley Valderrama MD   40 mg at 04/07/17 0911   • piperacillin-tazobactam (ZOSYN) in iso-osmotic dextrose IVPB 2.25 g (premix)  2.25 g Intravenous Q6H Alanna Brand MD 0 mL/hr at 04/06/17 1854 2.25 g at 04/07/17 2345    And   • Pharmacy to dose vancomycin   Does not apply Continuous PRN Alanna Brand MD       • SITagliptin (JANUVIA) tablet 50 mg  50 mg Oral Daily Hayley Valderrama MD   50 mg at 04/07/17 0909   • sodium chloride 0.9 % flush 1-10 mL  1-10 mL Intravenous PRN Hayley Valderrama MD   10 mL at 04/07/17 1250   • vancomycin (VANCOCIN) 2,000 mg in sodium chloride 0.9 % 500 mL IVPB  2,000 mg Intravenous Q24H Hayley Valderrama MD   2,000 mg at 04/08/17 0341       Assessment/Plan     Principal Problem:    NSTEMI (non-ST elevated myocardial infarction)  Active Problems:    Type 2 diabetes mellitus    Osteoarthritis of multiple joints    Essential hypertension    Bacterial pneumonia    Morbid obesity with BMI of 50.0-59.9, adult    Acute cystitis    Heart failure with preserved ejection fraction, borderline, class III    Pulmonary hypertension    Plan    NSTEMI -- medically managed per cardiology. No chest pain. Further eval next week with improvement  Pneumonia--clinically improved today following addition of Levaquin  BRANDO on CKD -- nephrology following  Cystitis -- broad abx coverage. Awaiting culture results          This document has been electronically signed by Javed Childers DO on April 8, 2017 8:37  AM

## 2017-04-08 NOTE — CONSULTS
CRITICAL CARE CONSULT NOTE  Abby Fu MD    [facility] CRITICAL CARE  4/8/2017        Blake Chavez  61 y.o. male  1955  [id number]            Requesting physician: Hayley Valderrama MD    Reason for Consultation:  Acute hypoxemic respiratory failure, Pneumonia    CC: shortness of breath    Subjective     History of Present Illness:  Blake Chavez is a 61 y.o. male with PMH significant for DM, morbid obesity, and HTN who was admitted on 4/5 with chest pain and dyspnea. Pt had a ureteral stent last Wednesday, but on Friday he noticed some left sided chest pain. He took an mynor seltzer and his pain resolved. Pt noticed increasing MARTINS over there next few days and when his chest pain recurred he came to the ED. He was found to have an elevated troponin as well as BRANDO so cardiology and nephrology were consulted. CXR showed infiltrates so he was also started on CAP coverage. The day after admission, the pt had worsening hypoxemia and dyspnea and was transferred to the ICU for further management. His abx were broadened to Vanc/ zosyn and more aggressive diuresis was started. CXR showed continued bilateral infiltrates so I was asked to see the patient to assist with management. Pt denies prior lung disease or smoking, though he did work as a . He denies recent cough, fevers, chills, nausea or vomiting. He does admit to onset of LE edema over the past few weeks and a slightly productive cough since hospitalization. Pt does report his wife commented on apneas but he has not been tested for GRAHAM. He has been on CPAP, but is now on venturi at 55% with adequate sats and his PaO2 was >150.    Review of Systems:   Review of Systems   Constitutional: Positive for fatigue. Negative for chills and fever.   HENT: Negative for congestion.    Respiratory: Positive for cough and shortness of breath. Negative for wheezing.    Cardiovascular: Positive for leg swelling. Negative for  chest pain and palpitations.   Gastrointestinal: Positive for abdominal pain and diarrhea. Negative for nausea and vomiting.       All systems were reviewed and negative except as noted above in the HPI.    Home Meds:  Prescriptions Prior to Admission   Medication Sig Dispense Refill Last Dose   • diltiaZEM (TIAZAC) 360 MG 24 hr capsule TAKE 1 CAPSULE EVERY DAY 30 capsule 5 3/29/2017 at 1000   • FE BISGLY-FE [id number] PO Take  by mouth Daily.   3/28/2017 at 2200   • gabapentin (NEURONTIN) 100 MG capsule TAKE 1 CAPSULE AT BEDTIME FOR FOOT PAIN 30 capsule 5 3/28/2017 at 2200   • Glucosamine 500 MG capsule Take 500 mg by mouth 2 (Two) Times a Day.   4/5/2017 at Unknown time   • glyBURIDE (DIAbeta) 5 MG tablet TAKE 2 TABLETS TWICE DAILY BEFORE MEALS (Patient taking differently: TAKE 2 TABLETS BID) 120 tablet 5 3/28/2017 at 2200   • hydrALAZINE (APRESOLINE) 25 MG tablet Take 1 tablet by mouth 3 (Three) Times a Day. (Patient taking differently: Take 25 mg by mouth 2 (Two) Times a Day.) 90 tablet 3 3/29/2017 at 1000   • lisinopril (PRINIVIL,ZESTRIL) 20 MG tablet TAKE 1 TABLET BY MOUTH EVERY DAY FOR BLOOD PRESSURE 30 tablet 5 3/28/2017 at 1000   • nateglinide (STARLIX) 120 MG tablet TAKE 1 TABLET BY MOUTH BEFORE MEALS (Patient taking differently: daily) 90 tablet 3 3/28/2017 at 1000   • Omega-3 Fatty Acids (FISH OIL) 1000 MG capsule capsule Take 1,000 mg by mouth Daily With Breakfast.   3/28/2017 at 2200   • ONE TOUCH ULTRA TEST test strip USE THREE TIMES DAILY 100 each 11 Taking   • POLY-IRON 150 150 MG capsule TAKE 1 CAPSULE TWICE DAILY 60 capsule 3 3/28/2017 at 2200   • pravastatin (PRAVACHOL) 40 MG tablet Take 1 tablet by mouth Daily. 31 tablet 6 3/28/2017 at 1000   • promethazine (PHENERGAN) 12.5 MG tablet Take 1 tablet by mouth Every 6 (Six) Hours As Needed (prn). 30 tablet 1 More than a month at Unknown time   • SITagliptin (JANUVIA) 100 MG tablet Take 1 tablet by mouth Daily. (Patient taking differently: Take  50 mg by mouth Daily.) 30 tablet 5 3/28/2017 at 1000   • VITAMIN D, ERGOCALCIFEROL, PO Take  by mouth Daily.   3/28/2017 at 1000   • amoxicillin-clavulanate (AUGMENTIN) 875-125 MG per tablet Take 1 tablet by mouth 2 (Two) Times a Day for 10 days. 20 tablet 0    • [] doxycycline (VIBRAMYCIN) 100 MG capsule Take 1 capsule by mouth Daily for 7 days. 7 capsule 0    • HYDROcodone-acetaminophen (NORCO)  MG per tablet Take 1 tablet by mouth Every 6 (Six) Hours As Needed for Moderate Pain (4-6).      • omeprazole (priLOSEC) 40 MG capsule Take 40 mg by mouth Daily.   3/29/2017 at 1000   • oxyCODONE-acetaminophen (PERCOCET) 7.5-325 MG per tablet Take 1-2 tablets by mouth Every 4 (Four) Hours As Needed (Pain). 15 tablet 0    • pantoprazole (PROTONIX) 40 MG EC tablet Take 40 mg by mouth Daily.   not started       Inpatient Meds:    Current Facility-Administered Medications:   •  atorvastatin (LIPITOR) tablet 80 mg, 80 mg, Oral, Nightly, Bharath Dobbins MD PhD, 80 mg at 17  •  clopidogrel (PLAVIX) tablet 75 mg, 75 mg, Oral, Daily, Bharath Dobbins MD PhD, 75 mg at 17 0909  •  dextrose (D50W) solution 25 g, 25 g, Intravenous, Q15 Min PRN, Hayley Valderrama MD  •  dextrose (GLUTOSE) oral gel 15 g, 15 g, Oral, Q15 Min PRN, Hayley Valderrama MD  •  furosemide (LASIX) injection 100 mg, 100 mg, Intravenous, Once, Bharath Dobbins MD PhD  •  gabapentin (NEURONTIN) capsule 100 mg, 100 mg, Oral, Nightly, Hayley Valderrama MD, 100 mg at 17  •  glucagon (human recombinant) (GLUCAGEN DIAGNOSTIC) injection 1 mg, 1 mg, Subcutaneous, Q15 Min PRN, Hayley Valderrama MD  •  heparin (porcine) 5000 UNIT/ML injection 5,000 Units, 5,000 Units, Subcutaneous, Q8H, Bharath Dobbins MD PhD  •  hydrALAZINE (APRESOLINE) tablet 25 mg, 25 mg, Oral, BID, Hayley Valderrama MD, 25 mg at 17  •  HYDROcodone-acetaminophen (NORCO)  MG per tablet 1 tablet, 1 tablet, Oral, Q6H PRN, Hayley Valderrama MD,  1 tablet at 04/06/17 0728  •  insulin aspart (novoLOG) injection 0-7 Units, 0-7 Units, Subcutaneous, 4x Daily AC & at Bedtime, aHyley Valderrama MD, 2 Units at 04/07/17 0653  •  ipratropium-albuterol (DUO-NEB) nebulizer solution 3 mL, 3 mL, Nebulization, 4x Daily - RT, Abby Fu MD  •  levoFLOXacin (LEVAQUIN) 500 mg/100 mL D5W (premix) (LEVAQUIN) 500 mg, 500 mg, Intravenous, Q24H, Javed Childers DO, 500 mg at 04/07/17 1121  •  magic butt ointment, , Topical, BID, Hayley Valderrama MD  •  metoprolol tartrate (LOPRESSOR) tablet 25 mg, 25 mg, Oral, Q12H, Bharath Dobbins MD PhD, 25 mg at 04/07/17 2031  •  pantoprazole (PROTONIX) EC tablet 40 mg, 40 mg, Oral, Daily, Hayley Valderrama MD, 40 mg at 04/07/17 0911  •  piperacillin-tazobactam (ZOSYN) in iso-osmotic dextrose IVPB 2.25 g (premix), 2.25 g, Intravenous, Q6H, Last Rate: 0 mL/hr at 04/06/17 1854, 2.25 g at 04/07/17 2345 **AND** [COMPLETED] vancomycin (VANCOCIN) 2,500 mg in sodium chloride 0.9 % 500 mL IVPB, 2,500 mg, Intravenous, Once, 2,500 mg at 04/06/17 0008 **AND** Pharmacy to dose vancomycin, , Does not apply, Continuous PRN, Alanna Brand MD  •  SITagliptin (JANUVIA) tablet 50 mg, 50 mg, Oral, Daily, Hayley Valderrama MD, 50 mg at 04/07/17 0909  •  sodium chloride 0.9 % flush 1-10 mL, 1-10 mL, Intravenous, PRN, Hayley Valderrama MD, 10 mL at 04/07/17 1250  •  vancomycin (VANCOCIN) 2,000 mg in sodium chloride 0.9 % 500 mL IVPB, 2,000 mg, Intravenous, Q24H, Hayley Valderrama MD, 2,000 mg at 04/08/17 0341    Allergies:  No Known Allergies    Past Medical History:  Past Medical History:   Diagnosis Date   • Acute cystitis    • BPH (benign prostatic hypertrophy)    • Calculus of kidney and ureter     recently passed      • Chest pain    • Crohn's disease    • Edema     unspecified - bilateral lower extremities     • Essential hypertension    • Hip pain    • Hypertensive disorder    • Knee pain    • Morbid obesity    • Nausea    • Nuclear senile cataract    •  Osteoarthritis of multiple joints    • Paraumbilical hernia    • Right hand fracture     as a teen   • Severe concentric left ventricular hypertrophy    • Type 2 diabetes mellitus      no BDR    • Type 2 diabetes mellitus      no BDR        Past Surgical History:  Past Surgical History:   Procedure Laterality Date   • COLONOSCOPY  ; 11/03/0216    Diverticulosis in sigmoid colon. 1 polyp in sigmoid colon; removed by cold biopsy polypectomy. Hemorrhoids found   • CYSTOSCOPY, URETEROSCOPY, RETROGRADE PYELOGRAM, STENT INSERTION Bilateral 3/29/2017    Procedure: CYSTOSCOPY, BILATERAL RETROGRADE, URETEROSCOPY, LASER LITHOTRIPSY, STENT PLACEMENT; LASER LITHOTRIPSY BLADDER CALCULI;  Surgeon: Blake Lopez MD;  Location: [facility];  Service:    • HYDROCELE EXCISION / REPAIR     • INJECTION OF MEDICATION  04/18/2016    Kenalog   • UPPER GASTROINTESTINAL ENDOSCOPY  11/03/2016        Social History:   Social History     Social History   • Marital status:      Spouse name: N/A   • Number of children: N/A   • Years of education: N/A     Occupational History   • Not on file.     Social History Main Topics   • Smoking status: Never Smoker   • Smokeless tobacco: Never Used   • Alcohol use No   • Drug use: No   • Sexual activity: Defer     Other Topics Concern   • Not on file     Social History Narrative    Retired from the fire department.  Lives in [place] with wife and son.  Uses four pronged cane.       Family History:  Family History   Problem Relation Age of Onset   • Cancer Other    • Diabetes Other    • Stroke Other    • Other Other      Colon problems    • Diabetes Mother    • Heart failure Mother    • Colon cancer Father    • Breast cancer Paternal Grandmother        Objective     Vital Sign Min/Max for last 24 hours:  Temp  Min: 99 °F (37.2 °C)  Max: 99.7 °F (37.6 °C)   BP  Min: 107/56  Max: 155/75   Pulse  Min: 75  Max: 97   Resp  Min: 18  Max: 23   SpO2  Min: 91 %  Max: 100 %   No Data Recorded   No Data  Recorded     Physical Exam:  99.1 °F (37.3 °C) (Axillary) 76 126/60 18 100% (!) 390 lb 3.4 oz (177 kg) Body mass index is 51.49 kg/(m^2).  Physical Exam   Constitutional: He is oriented to person, place, and time. Vital signs are normal. He appears well-developed and well-nourished.   Morbidly obese   HENT:   Head: Normocephalic and atraumatic.   Nose: Nose normal.   Mouth/Throat: Oropharynx is clear and moist and mucous membranes are normal.   Mallampati 4   Eyes: Conjunctivae, EOM and lids are normal.   Neck: Trachea normal. Neck supple. No thyroid mass present.   Cardiovascular: Normal rate and regular rhythm.  Exam reveals distant heart sounds. Exam reveals no gallop.    No murmur heard.  Pulmonary/Chest: Effort normal. No accessory muscle usage. No tachypnea. No respiratory distress. He has wheezes. He has rhonchi. He has no rales.   Abdominal: Soft. Normal appearance and bowel sounds are normal. There is no tenderness.   Morbidly obese   Musculoskeletal:        Right ankle: He exhibits swelling.        Left ankle: He exhibits swelling.        Right lower leg: He exhibits swelling.        Left lower leg: He exhibits swelling.       Vascular Status -  His exam exhibits no right foot edema. His exam exhibits no left foot edema.  Lymphadenopathy:        Head (right side): No submandibular adenopathy present.        Head (left side): No submandibular adenopathy present.     He has no cervical adenopathy.        Right: No supraclavicular adenopathy present.        Left: No supraclavicular adenopathy present.   Neurological: He is alert and oriented to person, place, and time.   Skin: Skin is warm and dry. No cyanosis. Nails show no clubbing.   Psychiatric: He has a normal mood and affect. His behavior is normal. Judgment normal. Cognition and memory are normal.       Central Lines/PICC: absent    Data Review-   Labs: I personally reviewed the latest laboratory results.  Lab Results (last 24 hours)     Procedure  Component Value Units Date/Time    Potassium [[id number]]  (Normal) Collected:  04/07/17 0930    Specimen:  Blood Updated:  04/07/17 1002     Potassium 4.4 mmol/L     Urine Culture [[id number]] Collected:  04/07/17 1102    Specimen:  Urine from Urine, Catheter Updated:  04/07/17 1127    Urinalysis With / Culture If Indicated [[id number]]  (Abnormal) Collected:  04/07/17 1102    Specimen:  Urine from Urine, Catheter Updated:  04/07/17 1154     Color, UA Yellow     Appearance, UA Turbid (A)     pH, UA <=5.0     Specific Gravity, UA 1.008     Glucose, UA Negative     Ketones, UA Negative     Bilirubin, UA Negative     Blood, UA Large (3+) (A)     Protein, UA Trace (A)     Leuk Esterase, UA Large (3+) (A)     Nitrite, UA Negative     Urobilinogen, UA 0.2 E.U./dL    Urinalysis, Microscopic Only [[id number]]  (Abnormal) Collected:  04/07/17 1102    Specimen:  Urine from Urine, Catheter Updated:  04/07/17 1214     RBC, UA Too Numerous to Count (A) /HPF      WBC, UA Too Numerous to Count (A) /HPF      Bacteria, UA 1+ (A) /HPF      Squamous Epithelial Cells, UA 6-12 (A) /HPF      Hyaline Casts, UA Unable to Determine /LPF      Fine Granular Casts, UA -- /LPF      Methodology Manual Light Microscopy    aPTT [[id number]]  (Abnormal) Collected:  04/07/17 1402    Specimen:  Blood Updated:  04/07/17 1431     PTT 47.1 (H) seconds     Narrative:       The recommended Heparin therapeutic range is 68-97 seconds.    POC Glucose Fingerstick [[id number]]  (Abnormal) Collected:  04/07/17 1106    Specimen:  Blood Updated:  04/07/17 1757     Glucose 135 (H) mg/dL       Meter: [id number]: [id number] ROLA FISCHER       Blood Culture [[id number]]  (Normal) Collected:  04/05/17 1712    Specimen:  Blood from Arm, Left Updated:  04/07/17 1801     Blood Culture No growth at 2 days    Blood Culture [[id number]]  (Normal) Collected:  04/05/17 1702    Specimen:  Blood from Arm, Right Updated:  04/07/17 1809     Blood Culture No growth at 2 days    POC  Glucose Fingerstick [[id number]]  (Normal) Collected:  04/07/17 1632    Specimen:  Blood Updated:  04/07/17 1921     Glucose 127 mg/dL       Meter: [id number]: [id number] ROLA FISCHER       POC Glucose Fingerstick [[id number]]  (Normal) Collected:  04/07/17 2030    Specimen:  Blood Updated:  04/07/17 2049     Glucose 115 mg/dL       Meter: [id number]: [id number] ELSI MEZA       aPTT [[id number]]  (Abnormal) Collected:  04/07/17 2230    Specimen:  Blood Updated:  04/07/17 2307     PTT 48.5 (H) seconds     Narrative:       The recommended Heparin therapeutic range is 68-97 seconds.    CBC (No Diff) [[id number]]  (Abnormal) Collected:  04/08/17 0216    Specimen:  Blood Updated:  04/08/17 0259     WBC 11.96 (H) 10*3/mm3      RBC 3.06 (L) 10*6/mm3      Hemoglobin 7.2 (L) g/dL      Hematocrit 23.2 (L) %      MCV 75.8 (L) fL      MCH 23.5 (L) pg      MCHC 31.0 (L) g/dL      RDW 15.1 (H) %      RDW-SD 42.6 fl      MPV 8.2 fL      Platelets 213 10*3/mm3     Renal Function Panel [[id number]]  (Abnormal) Collected:  04/08/17 0216    Specimen:  Blood Updated:  04/08/17 0306     Glucose 104 (H) mg/dL      BUN 54 (H) mg/dL      Creatinine 3.36 (H) mg/dL      Sodium 146 (H) mmol/L      Potassium 4.2 mmol/L      Chloride 113 (H) mmol/L      CO2 20.0 (L) mmol/L      Calcium 8.4 mg/dL      Albumin 2.90 (L) g/dL      Phosphorus 5.1 (H) mg/dL      Anion Gap 13.0 mmol/L      BUN/Creatinine Ratio 16.1     eGFR Non African Amer 19 (L) mL/min/1.73     aPTT [[id number]]  (Abnormal) Collected:  04/08/17 0216    Specimen:  Blood Updated:  04/08/17 0312     PTT 62.1 (H) seconds     Narrative:       The recommended Heparin therapeutic range is 68-97 seconds.    Vancomycin, Trough [[id number]]  (Abnormal) Collected:  04/08/17 0210    Specimen:  Blood Updated:  04/08/17 0320     Vancomycin Trough 16.84 (H) mcg/mL     POC Glucose Fingerstick [[id number]]  (Normal) Collected:  04/08/17 0526    Specimen:  Blood Updated:  04/08/17 0538      Glucose 113 mg/dL       Meter: [id number]: [id number] ELSI MEZA       Blood Gas, Arterial [[id number]]  (Abnormal) Collected:  04/08/17 0542    Specimen:  Arterial Blood Updated:  04/08/17 0559     Site --      Not performed at this site.        Josias's Test --      Not performed at this site.        pH, Arterial 7.344 (L) pH units      pCO2, Arterial 35.9 mm Hg      pO2, Arterial 149.0 (H) mm Hg      HCO3, Arterial 19.1 (L) mmol/L      Base Excess, Arterial -6.0 (L) mmol/L      O2 Saturation, Arterial 98.9 %      Hemoglobin, Blood Gas 8.2 (L) g/dL      Hematocrit, Blood Gas 24.0 (L) %      CO2 Content 20.2 (L)     Sodium, Arterial 143.9 mmol/L      Potassium, Arterial 4.12 mmol/L      Glucose, Arterial 108 mmol/L      Barometric Pressure for Blood Gas -- mmHg       Not performed at this site.        Modality --      Not performed at this site.        Ionized Calcium 4.7 mg/dL     aPTT [[id number]]  (Abnormal) Collected:  04/08/17 0636    Specimen:  Blood Updated:  04/08/17 0718     PTT 51.9 (H) seconds     Narrative:       The recommended Heparin therapeutic range is 68-97 seconds.           Imaging: I personally visualized the relevant images of scans/x-rays performed.  Imaging Results (last 24 hours)     Procedure Component Value Units Date/Time    XR Chest 1 View [[id number]] Collected:  04/08/17 0554     Updated:  04/08/17 0556    Narrative:       Exam: AP portable chest    INDICATION: Pneumonia    COMPARISON: 4/6/2016    FINDINGS: AP portable chest. Heart size upper limits normal. No  change in bilateral airspace opacity. Pulmonary vascularity is  mildly prominent. Cannot exclude a small  Left pleural effusion.      Impression:       No significant interval change in bilateral airspace  opacity    Electronically signed by:  Vicente Nascimento MD  4/8/2017 5:55 AM CDT  Workstation: [id number]            Assessment/Plan     Assessment:  # Acute hypoxemic respiratory failure  # Probable HAP  #  Pulmonary edema/ volume overload  # PH- likely secondary  # NSTEMI- troponin trending down  # Morbid obesity  # Probable GRAHAM/ OHS  # HFpEF  # BRANDO  # DM      Recommendations:  -Wean O2 to keep sats >89%. Turned down to 45% and satting in lower 90's  -CPAP/ Bipap prn and QHS. If unable to maintain sats or increased WOB despite NIV, would need to be intubated.  -Cont empiric vanc/ zosyn/ levaquin  -Check strep pneumo and legionella urine Ags  -F/u cultures  -Duonebs QID  -Hold steroids for now but may require if pneumonia progresses  -OOB to chair to optimize respiratory mechanics  -Pulmonary toilet  -Agree with continued efforts at diuresis  -Cardiac management per Dr. Dobbins  -PPX: heparin gtt, protonix  -FULL CODE      Critical care time spent: 49 minutes  This time excludes other billable procedures. Time does include preparation of documents, medical consultations, review of old records, and direct bedside care.       Thank you for allowing me to participate in the care of Blake Chavez. Please do not hesitate to contact me with any questions.       This document has been electronically signed by Abby Fu MD on April 8, 2017 7:35 AM      [phone]    EMR Dragon/Transcription disclaimer:     Much of this encounter note is an electronic transcription/translation of spoken language to printed text. The electronic translation of spoken language may permit erroneous, or at times, nonsensical words or phrases to be inadvertently transcribed; Although I have reviewed the note for such errors, some may still exist.

## 2017-04-08 NOTE — PROGRESS NOTES
"Cardiovascular Daily Progress Note  Bharath Dobbins M.D., Ph.D., [facility]      Subjective     Interval History:   Remains in CCU. Stable dyspnea. Adeaquate UOP with IV furosemide.    Review of Systems - History obtained from chart review and the patient  Respiratory ROS: positive for - shortness of breath  Cardiovascular ROS: negative for - chest pain    Objective     Vital Sign Min/Max for last 24 hours  Temp  Min: 99 °F (37.2 °C)  Max: 99.7 °F (37.6 °C)   BP  Min: 107/56  Max: 155/75   Pulse  Min: 75  Max: 97   Resp  Min: 18  Max: 23   SpO2  Min: 91 %  Max: 100 %   No Data Recorded   No Data Recorded     Flowsheet Rows         First Filed Value    Admission Height  72.99\" (185.4 cm) Documented at 04/06/2017 1542    Admission Weight  (!)  386 lb 6.4 oz (175 kg) Documented at 04/05/2017 1659        Last 3 weights    04/06/17  0500 04/06/17  1542 04/07/17  0402   Weight: (!) 387 lb 3.2 oz (176 kg) (!) 388 lb 0.2 oz (176 kg) (!) 390 lb 3.4 oz (177 kg)     Body mass index is 51.49 kg/(m^2).    Physical Exam:   GEN: alert, appears stated age and cooperative wearing BiPAP  Body Habitus: morbidly obese  HEENT: Head: Normocephalic, no lesions, without obvious abnormality.  Neck / Thyroid: Supple, no masses, nodes, nodules or enlargement. No arcus senilis, xanthelasma or xanthomas.   JVP: 14 cm of water at 45 degrees HJR: Present    Carotid:  Upstroke: easily palpated bilaterally Volume: Normal.    Carotid Bruit:  None  Subclavian Bruit: Not present.    Chest:  Normal Excursion: Good    I:E: Good  Pulmonary:clear to auscultation, no wheezes, rales or rhonchi, symmetric air entry      Precordium:  No palpable heaves or thrusts. P2 is not palpable.   [place]:  normal size and placement Palpable S4: Not present.   Heart rate: normal  Heart Rhythm: regular     Heart Sounds: S1: normal intensity  S2: increased P2  S3: absent   S4: absent  Opening Snap: absent  A2-OS:  N/A  Pericardial rub: absent    Ejection click: " None      Murmurs: Systolic: none  Diastolic: none  Extremity: 1+ edema  Pulses: Right radial artery has 2+ (normal) pulse and Left radial artery has 2+ (normal) pulse         DATA REVIEWED:    Lab Results (last 24 hours)     Procedure Component Value Units Date/Time    Potassium [[id number]]  (Normal) Collected:  04/07/17 0930    Specimen:  Blood Updated:  04/07/17 1002     Potassium 4.4 mmol/L     Urine Culture [[id number]] Collected:  04/07/17 1102    Specimen:  Urine from Urine, Catheter Updated:  04/07/17 1127    Urinalysis With / Culture If Indicated [[id number]]  (Abnormal) Collected:  04/07/17 1102    Specimen:  Urine from Urine, Catheter Updated:  04/07/17 1154     Color, UA Yellow     Appearance, UA Turbid (A)     pH, UA <=5.0     Specific Gravity, UA 1.008     Glucose, UA Negative     Ketones, UA Negative     Bilirubin, UA Negative     Blood, UA Large (3+) (A)     Protein, UA Trace (A)     Leuk Esterase, UA Large (3+) (A)     Nitrite, UA Negative     Urobilinogen, UA 0.2 E.U./dL    Urinalysis, Microscopic Only [[id number]]  (Abnormal) Collected:  04/07/17 1102    Specimen:  Urine from Urine, Catheter Updated:  04/07/17 1214     RBC, UA Too Numerous to Count (A) /HPF      WBC, UA Too Numerous to Count (A) /HPF      Bacteria, UA 1+ (A) /HPF      Squamous Epithelial Cells, UA 6-12 (A) /HPF      Hyaline Casts, UA Unable to Determine /LPF      Fine Granular Casts, UA -- /LPF      Methodology Manual Light Microscopy    aPTT [[id number]]  (Abnormal) Collected:  04/07/17 1402    Specimen:  Blood Updated:  04/07/17 1431     PTT 47.1 (H) seconds     Narrative:       The recommended Heparin therapeutic range is 68-97 seconds.    POC Glucose Fingerstick [[id number]]  (Abnormal) Collected:  04/07/17 1106    Specimen:  Blood Updated:  04/07/17 1757     Glucose 135 (H) mg/dL       Meter: [id number]: [id number] CANELA AMADO       Blood Culture [[id number]]  (Normal) Collected:  04/05/17 1712    Specimen:  Blood from Arm,  Left Updated:  04/07/17 1801     Blood Culture No growth at 2 days    Blood Culture [[id number]]  (Normal) Collected:  04/05/17 1702    Specimen:  Blood from Arm, Right Updated:  04/07/17 1801     Blood Culture No growth at 2 days    POC Glucose Fingerstick [[id number]]  (Normal) Collected:  04/07/17 1632    Specimen:  Blood Updated:  04/07/17 1921     Glucose 127 mg/dL       Meter: [id number]: [id number] ROLA PACI       POC Glucose Fingerstick [[id number]]  (Normal) Collected:  04/07/17 2030    Specimen:  Blood Updated:  04/07/17 2049     Glucose 115 mg/dL       Meter: [id number]: [id number] ELSI MEZA       aPTT [[id number]]  (Abnormal) Collected:  04/07/17 2230    Specimen:  Blood Updated:  04/07/17 2307     PTT 48.5 (H) seconds     Narrative:       The recommended Heparin therapeutic range is 68-97 seconds.    CBC (No Diff) [[id number]]  (Abnormal) Collected:  04/08/17 0216    Specimen:  Blood Updated:  04/08/17 0259     WBC 11.96 (H) 10*3/mm3      RBC 3.06 (L) 10*6/mm3      Hemoglobin 7.2 (L) g/dL      Hematocrit 23.2 (L) %      MCV 75.8 (L) fL      MCH 23.5 (L) pg      MCHC 31.0 (L) g/dL      RDW 15.1 (H) %      RDW-SD 42.6 fl      MPV 8.2 fL      Platelets 213 10*3/mm3     Renal Function Panel [[id number]]  (Abnormal) Collected:  04/08/17 0216    Specimen:  Blood Updated:  04/08/17 0306     Glucose 104 (H) mg/dL      BUN 54 (H) mg/dL      Creatinine 3.36 (H) mg/dL      Sodium 146 (H) mmol/L      Potassium 4.2 mmol/L      Chloride 113 (H) mmol/L      CO2 20.0 (L) mmol/L      Calcium 8.4 mg/dL      Albumin 2.90 (L) g/dL      Phosphorus 5.1 (H) mg/dL      Anion Gap 13.0 mmol/L      BUN/Creatinine Ratio 16.1     eGFR Non African Amer 19 (L) mL/min/1.73     aPTT [[id number]]  (Abnormal) Collected:  04/08/17 0216    Specimen:  Blood Updated:  04/08/17 0312     PTT 62.1 (H) seconds     Narrative:       The recommended Heparin therapeutic range is 68-97 seconds.    Vancomycin, Trough [[id number]]   (Abnormal) Collected:  04/08/17 0210    Specimen:  Blood Updated:  04/08/17 0320     Vancomycin Trough 16.84 (H) mcg/mL     POC Glucose Fingerstick [[id number]]  (Normal) Collected:  04/08/17 0526    Specimen:  Blood Updated:  04/08/17 0538     Glucose 113 mg/dL       Meter: [id number]: [id number] ELSI MEZA       Blood Gas, Arterial [[id number]]  (Abnormal) Collected:  04/08/17 0542    Specimen:  Arterial Blood Updated:  04/08/17 0559     Site --      Not performed at this site.        Josias's Test --      Not performed at this site.        pH, Arterial 7.344 (L) pH units      pCO2, Arterial 35.9 mm Hg      pO2, Arterial 149.0 (H) mm Hg      HCO3, Arterial 19.1 (L) mmol/L      Base Excess, Arterial -6.0 (L) mmol/L      O2 Saturation, Arterial 98.9 %      Hemoglobin, Blood Gas 8.2 (L) g/dL      Hematocrit, Blood Gas 24.0 (L) %      CO2 Content 20.2 (L)     Sodium, Arterial 143.9 mmol/L      Potassium, Arterial 4.12 mmol/L      Glucose, Arterial 108 mmol/L      Barometric Pressure for Blood Gas -- mmHg       Not performed at this site.        Modality --      Not performed at this site.        Ionized Calcium 4.7 mg/dL     aPTT [[id number]] Collected:  04/08/17 0636    Specimen:  Blood Updated:  04/08/17 0656        Imaging Results (last 24 hours)     Procedure Component Value Units Date/Time    US Venous Doppler Lower Extremity Bilateral (duplex) [[id number]] Collected:  04/06/17 1007     Updated:  04/06/17 1010    Narrative:       Patient Name:  INGRID HARRIS  Patient ID:  [id number]   Ordering:  SHERON KELLER  Attending:  RIC GUILLORY  Referring:  SHERON KELLER  ------------------------------------------------  Doppler duplex venous examination bilateral lower extremities.  CLINICAL INDICATION: Leg swelling.      COMPARISON: None    FINDINGS:    The common femoral,  femoral, and popliteal veins of the  bilateral     lower extremity are well identified and compress  normally.  Doppler signals  are heard either spontaneously or on  distal compression.  No evidence of intraluminal thrombus was  noted.     The visualized posterior calf veins are unremarkable.      Impression:       CONCLUSION:  No evidence of deep venous thrombosis in the common  femoral, superficial femoral veins, or popliteal veins of the  bilateral lower extremities.         Electronically signed by:  Leo Kelly MD  4/6/2017 10:09 AM CDT  Workstation: [id number]    XR Chest 1 View [[id number]] Collected:  04/06/17 2132     Updated:  04/06/17 2137    Narrative:       Exam: AP portable chest    INDICATION: Dyspnea    COMPARISON: 4/5/2017    FINDINGS: AP portable chest. Heart size upper limits of normal.  Interval increase in the bilateral airspace opacity and  infiltrates. Pulmonary vascularity is mildly prominent. Cannot  exclude a small left pleural effusion.      Impression:       Interval increase in the bilateral airspace  opacity/infiltrates    Electronically signed by:  Vicente Nascimento MD  4/6/2017 9:36 PM CDT  Workstation: [id number]        · Left Ventricle: Estimated EF appears to be in the range of 51 - 55%  · Global left ventricular wall motion appears abnormal. The left ventricular cavity is moderate-to-severely dilated  · Left ventricular diastolic dysfunction is noted (grade II w/high LAP) consistent with pseudonormalization. Elevated left atrial pressure  · Right Ventricle: Normal wall thickness, systolic function and septal motion noted. Right ventricular cavity is mildly dilated.  · The inferior vena cava is dilated. Normal IVC inspiratory collapse of greater than 50% noted.  · Mild mitral valve regurgitation is present.  · Trace to mild tricuspid valve regurgitation is present.  · Calculated right ventricular systolic pressure from tricuspid regurgitation is 66 mmHg  · Severe pulmonary hypertension is present.  · There is no evidence of pericardial effusion.    LE Duplex  FINDINGS:     The common femoral, femoral, and  popliteal veins of the  bilateral lower extremity are well identified and compress  normally. Doppler signals are heard either spontaneously or on  distal compression. No evidence of intraluminal thrombus was  noted.      The visualized posterior calf veins are unremarkable.    Assessment/Plan      1. ACS: NSTEMI. The patient is CP free. He appears to have had a septal infarction prior to his arrival. Most recent troponin is abnormal, but downtrending. CORIN score: elevated. The patient is currently hemodynamically stable.   -ASA 81mg; Plavix 75mg PO daily.   -D/C UFH and start Heparin DVT prop dosing  -Atorvastatin 80mg  -Lopressor  -Deferred consideration for LHC pending improvement in renal function    2. PAH/PVH. WHO Group likely 3.3/3.4; FC: Class 3 - comfortable at rest but have symptoms with less-than-ordinary effort..    RV status: adversely adapted. The patient is currently hypervolemic and perfused physiologic state.  He has had interval right ventricular dilation and increased PA systolic pressure.  His VQ scan is low-probability for PE and certainly was not consistent with CTEPH.  I am more concerned that he likely has GRAHAM/OHS.  However, his PA systolic pressure is out-of-proportion to his disease.  He may have developed CpC-PAH.  As such, he will require RHC.  There is no current indication of right ventricular failure on examination.  It's also very possible that he has developed an element of HFpEF.   -No current indication for PAH-specific medications  -Will plan on RHC on Monday, no pulmonary angiography  -Will need out-pt sleep eval  -Lasix 100mg IV NIKKI and reassess    3. HFpEF. Patient clearly has elevated left-sided filling pressures on physical examination.  His LV ejection fraction is preserved.  I am suspicious he has developed HFpEF.   While his right ventricle was dilated and his PA pressure is elevated, it is beyond the elevation seen in acute pulmonary embolism.  Additionally, I personally  reviewed his VQ scan and agree it is low-probability and not consistent with CTEPH.  -Diuretics, as above  -Will plan on RHC on Monday  -Antibiotics per primary team  -Dr. Fu will consult for his dyspnea and HCAP    Thank you for allowing me to participate in the care of your patient.  If I can be of any further assistance, please do not hesitate contact me.          This document has been electronically signed by Bharath Dobbins MD PhD on April 8, 2017 7:17 AM

## 2017-04-08 NOTE — PLAN OF CARE
Problem: Patient Care Overview (Adult)  Goal: Plan of Care Review  Outcome: Ongoing (interventions implemented as appropriate)    04/08/17 0538   Coping/Psychosocial Response Interventions   Plan Of Care Reviewed With patient   Patient Care Overview   Progress improving   Outcome Evaluation   Outcome Summary/Follow up Plan Pt was able to 55% venturi for an hour before feeling like he needed to go back on cpap, however oxyfgen sat and vitals were sttable. Chest xray lf lung still not fully clear.        Goal: Adult Individualization and Mutuality  Outcome: Ongoing (interventions implemented as appropriate)  Goal: Discharge Needs Assessment  Outcome: Ongoing (interventions implemented as appropriate)    Problem: Fall Risk (Adult)  Goal: Identify Related Risk Factors and Signs and Symptoms  Outcome: Ongoing (interventions implemented as appropriate)  Goal: Absence of Falls  Outcome: Ongoing (interventions implemented as appropriate)    Problem: Respiratory Insufficiency (Adult)  Goal: Identify Related Risk Factors and Signs and Symptoms  Outcome: Ongoing (interventions implemented as appropriate)  Goal: Acid/Base Balance  Outcome: Ongoing (interventions implemented as appropriate)  Goal: Effective Ventilation  Outcome: Ongoing (interventions implemented as appropriate)

## 2017-04-08 NOTE — PROGRESS NOTES
"[facility]  [address] [place] [address]. [id number]  T - [phone]  F - [phone]     Progress Note          PATIENT  DEMOGRAPHICS   PATIENT NAME: Blake Chavez                      PHYSICIAN: Vaibhav Avalos MD  : [id number]  MRN: [id number]   LOS: 3 days    Patient Care Team:  Himanshu Tovar MD as PCP - General (Family Medicine)  Subjective   SUBJECTIVE   Mr. Chavez has not been using BiPAP this morning.  His breathing has improved some after Lasix.  Urine output in the last 24 hours is reported to be around 2800 cc.  No chest pain or other complaints at present         Objective   OBJECTIVE   Vital Signs  Temp:  [99 °F (37.2 °C)-99.7 °F (37.6 °C)] 99.1 °F (37.3 °C)  Heart Rate:  [] 93  Resp:  [18-23] 20  BP: (112-147)/(55-98) 133/61    Flowsheet Rows         First Filed Value    Admission Height  72.99\" (185.4 cm) Documented at 2017 1542    Admission Weight  (!)  386 lb 6.4 oz (175 kg) Documented at 2017 1659           I/O last 3 completed shifts:  In: 1548.3 [I.V.:848.3; IV Piggyback:700]  Out: 3760 [Urine:3760]    PHYSICAL EXAM    Physical Exam   Lungs fair air entry bilaterally.  Heart regular rate and rhythm no gallop.  Abdomen obese soft nontender nondistended.  Extremities no edema    RESULTS   Results Review:      Results from last 7 days  Lab Units 17  0542 17  0216 17  0930 17  0600  17  0245  17  1147   SODIUM mmol/L  --  146*  --  148*  --  148*  --  149*   SODIUM, ARTERIAL mmol/L 143.9  --   --   --   < >  --   < >  --    POTASSIUM mmol/L  --  4.2 4.4 4.4  --  4.4  --  4.8   CHLORIDE mmol/L  --  113*  --  113*  --  113*  --  113*   TOTAL CO2 mmol/L  --  20.0*  --  20.0*  --  18.0*  --  20.0*   BUN mg/dL  --  54*  --  48*  --  41*  --  40*   CREATININE mg/dL  --  3.36*  --  3.22*  --  3.10*  --  2.99*   CALCIUM mg/dL  --  8.4  --  9.0  --  9.3  --  9.5   BILIRUBIN mg/dL  --   --   --  0.4  --   -- "   --  0.4   ALK PHOS U/L  --   --   --  106  --   --   --  120   ALT (SGPT) U/L  --   --   --  12*  --   --   --  12*   AST (SGOT) U/L  --   --   --  23  --   --   --  19   GLUCOSE mg/dL  --  104*  --  164*  --  196*  --  180*   GLUCOSE, ARTERIAL mmol/L 108  --   --   --   < >  --   < >  --    < > = values in this interval not displayed.    Estimated Creatinine Clearance: 37.9 mL/min (by C-G formula based on Cr of 3.36).      Results from last 7 days  Lab Units 04/08/17 0216 04/07/17  0600 04/06/17  0245   PHOSPHORUS mg/dL 5.1* 4.9* 3.9               Results from last 7 days  Lab Units 04/08/17  0216 04/07/17  0600 04/06/17  0245 04/05/17  1147   WBC 10*3/mm3 11.96* 16.39* 15.23* 13.98*   HEMOGLOBIN g/dL 7.2* 9.4* 9.0* 8.9*   PLATELETS 10*3/mm3 213 261 267 248         Results from last 7 days  Lab Units 04/05/17  1712   INR  1.28*         Imaging Results (last 24 hours)     Procedure Component Value Units Date/Time    XR Chest 1 View [[id number]] Collected:  04/08/17 0554     Updated:  04/08/17 0556    Narrative:       Exam: AP portable chest    INDICATION: Pneumonia    COMPARISON: 4/6/2016    FINDINGS: AP portable chest. Heart size upper limits normal. No  change in bilateral airspace opacity. Pulmonary vascularity is  mildly prominent. Cannot exclude a small  Left pleural effusion.      Impression:       No significant interval change in bilateral airspace  opacity    Electronically signed by:  Vicente Nascimento MD  4/8/2017 5:55 AM CDT  Workstation: [id number]           MEDICATIONS      atorvastatin 80 mg Oral Nightly   clopidogrel 75 mg Oral Daily   gabapentin 100 mg Oral Nightly   heparin (porcine) 5,000 Units Subcutaneous Q8H   hydrALAZINE 25 mg Oral BID   insulin aspart 0-7 Units Subcutaneous 4x Daily AC & at Bedtime   ipratropium-albuterol 3 mL Nebulization 4x Daily - RT   levoFLOXacin 500 mg Intravenous Q24H   magic butt ointment  Topical BID   metoprolol tartrate 25 mg Oral Q12H   pantoprazole 40 mg Oral  Daily   piperacillin-tazobactam 2.25 g Intravenous Q6H   SITagliptin 50 mg Oral Daily   vancomycin 2,000 mg Intravenous Q24H       Pharmacy to dose vancomycin        Assessment/Plan   ASSESSMENT / PLAN    Principal Problem:    NSTEMI (non-ST elevated myocardial infarction)  Active Problems:    Type 2 diabetes mellitus    Osteoarthritis of multiple joints    Essential hypertension    Bacterial pneumonia    Morbid obesity with BMI of 50.0-59.9, adult    Acute cystitis    Heart failure with preserved ejection fraction, borderline, class III    Pulmonary hypertension    1.  Acute kidney injury still worsening some most likely secondary to Lasix.  2.  Non-ST elevation MI still not clear for any intervention.  3.  Hemoglobin had dropped significantly and he may require blood transfusion.  Plan  1.  Volume status seems to be acceptable and there is no need for Lasix today.  We'll keep him on fluid restriction and continue to monitor renal function and electrolytes closely.  2.  He will require BiPAP at night probably CPAP at home             Vaibhav Avalos MD  04/08/17  3:52 PM

## 2017-04-08 NOTE — PROGRESS NOTES
"Pharmacokinetics by Pharmacy - Vancomycin    Blake Chavez is a 61 y.o. male  [Ht: 72.99\" (185.4 cm); Wt: (!) 390 lb 3.4 oz (177 kg)]    Estimated Creatinine Clearance: 37.9 mL/min (by C-G formula based on Cr of 3.36).   Lab Results   Component Value Date    CREATININE 3.36 (H) 04/08/2017    CREATININE 3.22 (H) 04/07/2017    CREATININE 3.10 (H) 04/06/2017      Lab Results   Component Value Date    WBC 11.96 (H) 04/08/2017    WBC 16.39 (H) 04/07/2017    WBC 15.23 (H) 04/06/2017      Temp Readings from Last 1 Encounters:   04/08/17 99.1 °F (37.3 °C) (Axillary)      Lab Results   Component Value Date    VANCOTROUGH 16.84 (H) 04/08/2017         Culture Results:  Microbiology Results (last 10 days)       Procedure Component Value - Date/Time      Urine Culture [[id number]]  (Abnormal) Collected:  04/07/17 1102    Lab Status:  Preliminary result Specimen:  Urine from Urine, Catheter Updated:  04/08/17 0841     Urine Culture >100,000 CFU/mL Enterococcus species (A)    Urine Culture [[id number]] Collected:  04/05/17 1832    Lab Status:  Final result Specimen:  Urine from Urine, Clean Catch Updated:  04/07/17 0602     Urine Culture Culture in progress      Mixed Culture    Narrative:         Specimen contains mixed organisms of questionable pathogenicity which indicates contamination with commensal gricelda.  Further identification is unlikely to provide clinically useful information.  Suggest recollection.    S. Pneumo Ag Urine or CSF [[id number]]  (Normal) Collected:  04/05/17 1832    Lab Status:  Final result Specimen:  Urine from Urine, Clean Catch Updated:  04/05/17 2002     Strep Pneumo Ag Negative    Respiratory Culture [[id number]] Collected:  04/05/17 1724    Lab Status:  Final result Specimen:  Sputum from Unknown Updated:  04/05/17 1843     Respiratory Culture Rejected     Gram Stain Result Rare (1+) WBCs per low power field      Moderate (3+) Epithelial cells per low power field      Specimen rejected based upon " microscopic exam of gram stain    Mycoplasma Pneumoniae PCR [[id number]] Collected:  04/05/17 1719    Lab Status:  Final result Specimen:  Sputum from Nasopharynx Updated:  04/06/17 0916     MYCOPLASMAE PNEUMONIAE BY PCR Negative    Narrative:       This test is performed by utilizing real time polymerace chain recation (PCR).   This test is performed by utilizing real time polymerace chain recation (PCR).     Blood Culture [[id number]]  (Normal) Collected:  04/05/17 1712    Lab Status:  Preliminary result Specimen:  Blood from Arm, Left Updated:  04/07/17 1801     Blood Culture No growth at 2 days    Blood Culture [[id number]]  (Normal) Collected:  04/05/17 1702    Lab Status:  Preliminary result Specimen:  Blood from Arm, Right Updated:  04/07/17 1801     Blood Culture No growth at 2 days    Stone Analysis [[id number]] Collected:  03/29/17 1858    Lab Status:  Final result Specimen:  Calculus from Urinary Bladder Updated:  04/05/17 1710     Color Orange     Size Comment mm       Specimen received as fragments.        Stone Weight 112.0 mg      Composition Comment      Percentage (Represents the % composition)        Ca Oxalate - Monohydrate, Stone 30 %      Calcium phosphate, Stone 03 %      Uric Acid, Stone 65 %      Sodium Acid Urate 02 %      Nidus No Nidus visualized     Please note Comment      Calculi report without photograph will follow via computer, mail,  or  delivery.        Additional comment: Comment      Physician questions regarding Calculi Analysis contact [facility] at:  [phone].        Disclaimer: Comment      This test was developed and its performance characteristics  determined by [facility]. It has not been cleared or approved  by the Food and Drug Administration.       Narrative:       Performed at:  [address]  [id number]  : Altaf Carr MD, Phone:  [phone]          Respiratory Culture   Date Value Ref Range Status   04/05/2017  Rejected  Final         Indication for use: cystitis/pneumonia    Current Vancomycin Dose:  2000 mg IVPB every 24 hours, day 4 of therapy.      Assessment/Plan:  Trough within goal range. Urine culture positive for enterococcus, blood and respiratory cultures negative currently. Creatinine clearance declining. Will check another trough in AM prior to dose. Pharmacy will continue to monitor renal function and adjust dose accordingly.    Tolu Barry [facility]   04/08/17 9:12 AM

## 2017-04-09 LAB
ALBUMIN SERPL-MCNC: 2.6 G/DL (ref 3.4–4.8)
ANION GAP SERPL CALCULATED.3IONS-SCNC: 13 MMOL/L (ref 5–15)
ARTERIAL PATENCY WRIST A: ABNORMAL
ATMOSPHERIC PRESS: ABNORMAL MMHG
BACTERIA SPEC AEROBE CULT: ABNORMAL
BASE EXCESS BLDA CALC-SCNC: -5.6 MMOL/L (ref -2.4–2.4)
BDY SITE: ABNORMAL
BUN BLD-MCNC: 60 MG/DL (ref 7–21)
BUN/CREAT SERPL: 16.3 (ref 7–25)
CA-I BLD-MCNC: 4.7 MG/DL (ref 4.5–4.9)
CALCIUM SPEC-SCNC: 8.3 MG/DL (ref 8.4–10.2)
CHLORIDE SERPL-SCNC: 114 MMOL/L (ref 95–110)
CO2 BLDA-SCNC: 20.8 MMOL/L (ref 23–27)
CO2 SERPL-SCNC: 17 MMOL/L (ref 22–31)
CREAT BLD-MCNC: 3.69 MG/DL (ref 0.7–1.3)
DEPRECATED RDW RBC AUTO: 41.5 FL (ref 35.1–43.9)
ERYTHROCYTE [DISTWIDTH] IN BLOOD BY AUTOMATED COUNT: 15 % (ref 11.5–14.5)
GFR SERPL CREATININE-BSD FRML MDRD: 17 ML/MIN/1.73 (ref 49–113)
GLUCOSE BLD-MCNC: 159 MG/DL (ref 60–100)
GLUCOSE BLDA-MCNC: 171 MMOL/L
GLUCOSE BLDC GLUCOMTR-MCNC: 221 MG/DL (ref 70–130)
GLUCOSE BLDC GLUCOMTR-MCNC: 225 MG/DL (ref 70–130)
GLUCOSE BLDC GLUCOMTR-MCNC: 226 MG/DL (ref 70–130)
GLUCOSE BLDC GLUCOMTR-MCNC: 241 MG/DL (ref 70–130)
HCO3 BLDA-SCNC: 19.6 MMOL/L (ref 22–26)
HCT VFR BLD AUTO: 24.6 % (ref 39–49)
HCT VFR BLD CALC: 23 % (ref 40–54)
HGB BLD-MCNC: 7.7 G/DL (ref 13.7–17.3)
HGB BLDA-MCNC: 7.9 G/DL (ref 14–18)
IRON 24H UR-MRATE: <10 MCG/DL (ref 49–181)
IRON SATN MFR SERPL: ABNORMAL % (ref 20–55)
MCH RBC QN AUTO: 23.6 PG (ref 26.5–34)
MCHC RBC AUTO-ENTMCNC: 31.3 G/DL (ref 31.5–36.3)
MCV RBC AUTO: 75.5 FL (ref 80–98)
MODALITY: ABNORMAL
PCO2 BLDA: 37.1 MM HG (ref 35–45)
PH BLDA: 7.34 PH UNITS (ref 7.35–7.45)
PHOSPHATE SERPL-MCNC: 5.5 MG/DL (ref 2.4–4.4)
PLATELET # BLD AUTO: 236 10*3/MM3 (ref 150–450)
PMV BLD AUTO: 8.8 FL (ref 8–12)
PO2 BLDA: 95.5 MM HG (ref 80–105)
POTASSIUM BLD-SCNC: 4.2 MMOL/L (ref 3.5–5.1)
POTASSIUM BLDA-SCNC: 4.07 MMOL/L (ref 3.6–4.9)
RBC # BLD AUTO: 3.26 10*6/MM3 (ref 4.37–5.74)
SAO2 % BLDCOA: 96.9 % (ref 94–100)
SODIUM BLD-SCNC: 144 MMOL/L (ref 137–145)
SODIUM BLDA-SCNC: 142.9 MMOL/L (ref 138–146)
TIBC SERPL-MCNC: 178 MCG/DL (ref 261–462)
VANCOMYCIN TROUGH SERPL-MCNC: 23.83 MCG/ML (ref 10–15)
WBC NRBC COR # BLD: 15.39 10*3/MM3 (ref 3.2–9.8)
WHOLE BLOOD HOLD SPECIMEN: NORMAL

## 2017-04-09 PROCEDURE — 80202 ASSAY OF VANCOMYCIN: CPT | Performed by: FAMILY MEDICINE

## 2017-04-09 PROCEDURE — 99232 SBSQ HOSP IP/OBS MODERATE 35: CPT | Performed by: INTERNAL MEDICINE

## 2017-04-09 PROCEDURE — 94660 CPAP INITIATION&MGMT: CPT

## 2017-04-09 PROCEDURE — 94799 UNLISTED PULMONARY SVC/PX: CPT

## 2017-04-09 PROCEDURE — 25010000002 LEVOFLOXACIN PER 250 MG: Performed by: FAMILY MEDICINE

## 2017-04-09 PROCEDURE — 82803 BLOOD GASES ANY COMBINATION: CPT | Performed by: FAMILY MEDICINE

## 2017-04-09 PROCEDURE — 63710000001 INSULIN ASPART PER 5 UNITS: Performed by: FAMILY MEDICINE

## 2017-04-09 PROCEDURE — 36600 WITHDRAWAL OF ARTERIAL BLOOD: CPT

## 2017-04-09 PROCEDURE — 25010000002 LINEZOLID 600 MG/300ML SOLUTION: Performed by: FAMILY MEDICINE

## 2017-04-09 PROCEDURE — 82962 GLUCOSE BLOOD TEST: CPT

## 2017-04-09 PROCEDURE — 94760 N-INVAS EAR/PLS OXIMETRY 1: CPT

## 2017-04-09 PROCEDURE — 80069 RENAL FUNCTION PANEL: CPT | Performed by: FAMILY MEDICINE

## 2017-04-09 PROCEDURE — 99232 SBSQ HOSP IP/OBS MODERATE 35: CPT | Performed by: FAMILY MEDICINE

## 2017-04-09 PROCEDURE — 25010000002 PIPERACILLIN SOD-TAZOBACTAM PER 1 G: Performed by: INTERNAL MEDICINE

## 2017-04-09 PROCEDURE — 86900 BLOOD TYPING SEROLOGIC ABO: CPT

## 2017-04-09 PROCEDURE — 83550 IRON BINDING TEST: CPT | Performed by: INTERNAL MEDICINE

## 2017-04-09 PROCEDURE — 25010000002 HEPARIN (PORCINE) PER 1000 UNITS: Performed by: INTERNAL MEDICINE

## 2017-04-09 PROCEDURE — 86901 BLOOD TYPING SEROLOGIC RH(D): CPT

## 2017-04-09 PROCEDURE — 83540 ASSAY OF IRON: CPT | Performed by: INTERNAL MEDICINE

## 2017-04-09 PROCEDURE — 85027 COMPLETE CBC AUTOMATED: CPT | Performed by: FAMILY MEDICINE

## 2017-04-09 PROCEDURE — 99233 SBSQ HOSP IP/OBS HIGH 50: CPT | Performed by: INTERNAL MEDICINE

## 2017-04-09 RX ORDER — SODIUM CHLORIDE 0.9 % (FLUSH) 0.9 %
1-10 SYRINGE (ML) INJECTION AS NEEDED
Status: DISCONTINUED | OUTPATIENT
Start: 2017-04-09 | End: 2017-04-14 | Stop reason: HOSPADM

## 2017-04-09 RX ORDER — SODIUM CHLORIDE 9 MG/ML
100 INJECTION, SOLUTION INTRAVENOUS ONCE
Status: COMPLETED | OUTPATIENT
Start: 2017-04-09 | End: 2017-04-10

## 2017-04-09 RX ORDER — ECHINACEA PURPUREA EXTRACT 125 MG
1 TABLET ORAL AS NEEDED
Status: DISCONTINUED | OUTPATIENT
Start: 2017-04-09 | End: 2017-04-14 | Stop reason: HOSPADM

## 2017-04-09 RX ORDER — FERROUS SULFATE TAB EC 324 MG (65 MG FE EQUIVALENT) 324 (65 FE) MG
324 TABLET DELAYED RESPONSE ORAL 2 TIMES DAILY WITH MEALS
Status: DISCONTINUED | OUTPATIENT
Start: 2017-04-09 | End: 2017-04-14 | Stop reason: HOSPADM

## 2017-04-09 RX ORDER — LINEZOLID 2 MG/ML
600 INJECTION, SOLUTION INTRAVENOUS EVERY 12 HOURS SCHEDULED
Status: DISCONTINUED | OUTPATIENT
Start: 2017-04-09 | End: 2017-04-13

## 2017-04-09 RX ADMIN — HEPARIN SODIUM 5000 UNITS: 5000 INJECTION, SOLUTION INTRAVENOUS; SUBCUTANEOUS at 21:31

## 2017-04-09 RX ADMIN — METOPROLOL TARTRATE 25 MG: 25 TABLET ORAL at 09:33

## 2017-04-09 RX ADMIN — HEPARIN SODIUM 5000 UNITS: 5000 INJECTION, SOLUTION INTRAVENOUS; SUBCUTANEOUS at 06:06

## 2017-04-09 RX ADMIN — TAZOBACTAM SODIUM AND PIPERACILLIN SODIUM 2.25 G: 250; 2 INJECTION, SOLUTION INTRAVENOUS at 13:22

## 2017-04-09 RX ADMIN — METOPROLOL TARTRATE 25 MG: 25 TABLET ORAL at 21:32

## 2017-04-09 RX ADMIN — GABAPENTIN 100 MG: 100 CAPSULE ORAL at 21:36

## 2017-04-09 RX ADMIN — HYDRALAZINE HYDROCHLORIDE 25 MG: 25 TABLET ORAL at 18:12

## 2017-04-09 RX ADMIN — INSULIN ASPART 3 UNITS: 100 INJECTION, SOLUTION INTRAVENOUS; SUBCUTANEOUS at 09:59

## 2017-04-09 RX ADMIN — TAZOBACTAM SODIUM AND PIPERACILLIN SODIUM 2.25 G: 250; 2 INJECTION, SOLUTION INTRAVENOUS at 23:50

## 2017-04-09 RX ADMIN — SODIUM CHLORIDE 100 ML/HR: 9 INJECTION, SOLUTION INTRAVENOUS at 15:17

## 2017-04-09 RX ADMIN — LINEZOLID 600 MG: 600 INJECTION, SOLUTION INTRAVENOUS at 21:31

## 2017-04-09 RX ADMIN — FERROUS SULFATE TAB EC 324 MG (65 MG FE EQUIVALENT) 324 MG: 324 (65 FE) TABLET DELAYED RESPONSE at 18:13

## 2017-04-09 RX ADMIN — Medication: at 09:34

## 2017-04-09 RX ADMIN — Medication: at 18:14

## 2017-04-09 RX ADMIN — TAZOBACTAM SODIUM AND PIPERACILLIN SODIUM 2.25 G: 250; 2 INJECTION, SOLUTION INTRAVENOUS at 18:13

## 2017-04-09 RX ADMIN — HEPARIN SODIUM 5000 UNITS: 5000 INJECTION, SOLUTION INTRAVENOUS; SUBCUTANEOUS at 13:27

## 2017-04-09 RX ADMIN — IPRATROPIUM BROMIDE AND ALBUTEROL SULFATE 3 ML: 2.5; .5 SOLUTION RESPIRATORY (INHALATION) at 07:20

## 2017-04-09 RX ADMIN — ATORVASTATIN CALCIUM 80 MG: 40 TABLET, FILM COATED ORAL at 21:32

## 2017-04-09 RX ADMIN — INSULIN ASPART 3 UNITS: 100 INJECTION, SOLUTION INTRAVENOUS; SUBCUTANEOUS at 21:38

## 2017-04-09 RX ADMIN — IPRATROPIUM BROMIDE AND ALBUTEROL SULFATE 3 ML: 2.5; .5 SOLUTION RESPIRATORY (INHALATION) at 10:26

## 2017-04-09 RX ADMIN — IPRATROPIUM BROMIDE AND ALBUTEROL SULFATE 3 ML: 2.5; .5 SOLUTION RESPIRATORY (INHALATION) at 19:36

## 2017-04-09 RX ADMIN — INSULIN ASPART 3 UNITS: 100 INJECTION, SOLUTION INTRAVENOUS; SUBCUTANEOUS at 11:35

## 2017-04-09 RX ADMIN — HYDROCODONE BITARTRATE AND ACETAMINOPHEN 1 TABLET: 10; 325 TABLET ORAL at 14:14

## 2017-04-09 RX ADMIN — IPRATROPIUM BROMIDE AND ALBUTEROL SULFATE 3 ML: 2.5; .5 SOLUTION RESPIRATORY (INHALATION) at 14:36

## 2017-04-09 RX ADMIN — HYDRALAZINE HYDROCHLORIDE 25 MG: 25 TABLET ORAL at 09:34

## 2017-04-09 RX ADMIN — HYDROCODONE BITARTRATE AND ACETAMINOPHEN 1 TABLET: 10; 325 TABLET ORAL at 21:32

## 2017-04-09 RX ADMIN — INSULIN ASPART 3 UNITS: 100 INJECTION, SOLUTION INTRAVENOUS; SUBCUTANEOUS at 18:13

## 2017-04-09 RX ADMIN — LINEZOLID 600 MG: 600 INJECTION, SOLUTION INTRAVENOUS at 09:35

## 2017-04-09 RX ADMIN — PANTOPRAZOLE SODIUM 40 MG: 40 TABLET, DELAYED RELEASE ORAL at 09:33

## 2017-04-09 RX ADMIN — CLOPIDOGREL BISULFATE 75 MG: 75 TABLET ORAL at 09:33

## 2017-04-09 RX ADMIN — TAZOBACTAM SODIUM AND PIPERACILLIN SODIUM 2.25 G: 250; 2 INJECTION, SOLUTION INTRAVENOUS at 06:06

## 2017-04-09 RX ADMIN — FERROUS SULFATE TAB EC 324 MG (65 MG FE EQUIVALENT) 324 MG: 324 (65 FE) TABLET DELAYED RESPONSE at 09:34

## 2017-04-09 RX ADMIN — SITAGLIPTIN 50 MG: 25 TABLET, FILM COATED ORAL at 09:33

## 2017-04-09 RX ADMIN — LEVOFLOXACIN 500 MG: 5 INJECTION, SOLUTION INTRAVENOUS at 11:32

## 2017-04-09 NOTE — PROGRESS NOTES
"[facility]  [address] [place] [address]. [address]  T - [phone]  F - [phone]     Progress Note          PATIENT  DEMOGRAPHICS   PATIENT NAME: Blake Chavez                      PHYSICIAN: Vaibhav Avalos MD  : 1955  MRN: [id number]   LOS: 4 days    Patient Care Team:  Himanshu Tovar MD as PCP - General (Family Medicine)  Subjective   SUBJECTIVE   The patient states that he's breathing a little better.  He reports some swelling in his ankles but no orthopnea.  He has not been able to get out of bed yet urine output in the last 24 hours is recorded to be about 1700 cc.  Blood pressure has been under fair control        Objective   OBJECTIVE   Vital Signs  Temp:  [98.6 °F (37 °C)-99.7 °F (37.6 °C)] 99.7 °F (37.6 °C)  Heart Rate:  [] 94  Resp:  [16-22] 16  BP: (103-157)/(56-88) 116/59    Flowsheet Rows         First Filed Value    Admission Height  72.99\" (185.4 cm) Documented at 2017 1542    Admission Weight  (!)  386 lb 6.4 oz (175 kg) Documented at 2017 1659           I/O last 3 completed shifts:  In: 448.4 [I.V.:448.4]  Out: 2275 [Urine:2275]    PHYSICAL EXAM    Physical Exam   Extremities entry bilaterally no rales or wheezing.  Heart regular rate and rhythm no gallop.  Abdomen obese soft nontender distended bowel sounds are present.  Extremities trace edema in both ankles    RESULTS   Results Review:      Results from last 7 days  Lab Units 17  0435 17  0300 17  0542 17  0216 17  0930 17  0600  17  1147   SODIUM mmol/L  --  144  --  146*  --  148*  < > 149*   SODIUM, ARTERIAL mmol/L 142.9  --  143.9  --   --   --   < >  --    POTASSIUM mmol/L  --  4.2  --  4.2 4.4 4.4  < > 4.8   CHLORIDE mmol/L  --  114*  --  113*  --  113*  < > 113*   TOTAL CO2 mmol/L  --  17.0*  --  20.0*  --  20.0*  < > 20.0*   BUN mg/dL  --  60*  --  54*  --  48*  < > 40*   CREATININE mg/dL  --  3.69*  --  3.36*  --  " 3.22*  < > 2.99*   CALCIUM mg/dL  --  8.3*  --  8.4  --  9.0  < > 9.5   BILIRUBIN mg/dL  --   --   --   --   --  0.4  --  0.4   ALK PHOS U/L  --   --   --   --   --  106  --  120   ALT (SGPT) U/L  --   --   --   --   --  12*  --  12*   AST (SGOT) U/L  --   --   --   --   --  23  --  19   GLUCOSE mg/dL  --  159*  --  104*  --  164*  < > 180*   GLUCOSE, ARTERIAL mmol/L 171  --  108  --   --   --   < >  --    < > = values in this interval not displayed.    Estimated Creatinine Clearance: 34.5 mL/min (by C-G formula based on Cr of 3.69).      Results from last 7 days  Lab Units 04/09/17  0300 04/08/17  0216 04/07/17  0600   PHOSPHORUS mg/dL 5.5* 5.1* 4.9*               Results from last 7 days  Lab Units 04/09/17  0300 04/08/17  0216 04/07/17  0600 04/06/17  0245 04/05/17  1147   WBC 10*3/mm3 15.39* 11.96* 16.39* 15.23* 13.98*   HEMOGLOBIN g/dL 7.7* 7.2* 9.4* 9.0* 8.9*   PLATELETS 10*3/mm3 236 213 261 267 248         Results from last 7 days  Lab Units 04/05/17  1712   INR  1.28*         Imaging Results (last 24 hours)     ** No results found for the last 24 hours. **           MEDICATIONS      atorvastatin 80 mg Oral Nightly   clopidogrel 75 mg Oral Daily   ferrous sulfate 324 mg Oral BID With Meals   gabapentin 100 mg Oral Nightly   heparin (porcine) 5,000 Units Subcutaneous Q8H   hydrALAZINE 25 mg Oral BID   insulin aspart 0-7 Units Subcutaneous 4x Daily AC & at Bedtime   ipratropium-albuterol 3 mL Nebulization 4x Daily - RT   levoFLOXacin 500 mg Intravenous Q24H   Linezolid 600 mg Intravenous Q12H   magic butt ointment  Topical BID   metoprolol tartrate 25 mg Oral Q12H   pantoprazole 40 mg Oral Daily   piperacillin-tazobactam 2.25 g Intravenous Q6H   SITagliptin 50 mg Oral Daily   sodium chloride 100 mL/hr Intravenous Once          Assessment/Plan   ASSESSMENT / PLAN    Principal Problem:    NSTEMI (non-ST elevated myocardial infarction)  Active Problems:    Type 2 diabetes mellitus    Osteoarthritis of multiple  joints    Essential hypertension    Bacterial pneumonia    Morbid obesity with BMI of 50.0-59.9, adult    Acute cystitis    Heart failure with preserved ejection fraction, borderline, class III    Pulmonary hypertension    Assessment  1.  Acute kidney injury, renal function continues to worsen, currently non-oliguric.  2.  Non-ST elevation myocardial infarction.  3.  Anemia.  Plan  1.  Volume status is acceptable.  I'm concerned that there might be a component of intravascular volume depletion despite the mild swelling of the ankles.  We'll give him 1 L of normal saline over the next 10 hours and repeat chemistry profile after that.  If renal function continues to worsen he may require kidney replacement therapy.  2.  Patient is not clear for contrast administration at this point and I will not clear them unless it's an emergency.  3.  Continue to use BiPAP on a regular basis           Vaibhav Avalos MD  04/09/17  2:51 PM

## 2017-04-09 NOTE — PROGRESS NOTES
CRITICAL CARE PROGRESS NOTE  Abby Fu MD    [facility] CRITICAL CARE  4/9/2017        Blake Chavez  [id number]  1955  61 y.o. male            LOS: 4 days   Javed Childers DO    Chief Complaint/Reason for visit: F/u acute hypoxic respiratory failure, HAP    Subjective     Interval History:   History taken from: patient/ chart    O2 weaned to 6lpm NC yesterday. Wore CPAP with sleep but had ot have removed as it was uncomfortable. Still with productive cough. Cr continues to increase. Diuresing but weight up 5lbs since admission.    Review of Systems:   Review of Systems   Constitutional: Positive for fatigue. Negative for fever.   Respiratory: Positive for cough and shortness of breath. Negative for wheezing.    Cardiovascular: Positive for leg swelling. Negative for chest pain and palpitations.   Gastrointestinal: Negative for abdominal pain.     All systems were reviewed and negative except as noted above in the HPI.    Medical history, surgical history, social history, family history reviewed    Objective     Intake/Output:    Intake/Output Summary (Last 24 hours) at 04/09/17 0767  Last data filed at 04/09/17 0500   Gross per 24 hour   Intake           109.67 ml   Output             1675 ml   Net         -1565.33 ml       Nutrition: PO    Infusions:    Pharmacy to dose vancomycin        Respiratory:       Vital Sign Min/Max for last 24 hours:  Temp  Min: 98.6 °F (37 °C)  Max: 98.6 °F (37 °C)   BP  Min: 103/56  Max: 148/78   Pulse  Min: 80  Max: 105   Resp  Min: 20  Max: 23   SpO2  Min: 92 %  Max: 100 %   Flow (L/min)  Min: 6  Max: 6   Weight  Min: 392 lb (178 kg)  Max: 392 lb (178 kg)     Physical Exam:  98.6 °F (37 °C) 93 103/56 20 99% (!) 392 lb (178 kg) Body mass index is 51.73 kg/(m^2).  Physical Exam   Constitutional: He is oriented to person, place, and time. Vital signs are normal. He appears well-developed and well-nourished.   morbidly obese   HENT:   Head:  Normocephalic and atraumatic.   Nose: Nose normal.   Mouth/Throat: Mucous membranes are normal.   Eyes: EOM are normal.   Neck:   Large neck circumference   Cardiovascular: Normal rate, regular rhythm and normal heart sounds.  Exam reveals no gallop.    No murmur heard.  Pulmonary/Chest: Effort normal. No accessory muscle usage. No respiratory distress. He has no wheezes. He has rhonchi.   Abdominal: Soft. Normal appearance and bowel sounds are normal. There is no tenderness.   Morbidly obese   Musculoskeletal:        Right ankle: He exhibits swelling.        Left ankle: He exhibits swelling.        Right lower leg: He exhibits swelling.        Left lower leg: He exhibits swelling.       Vascular Status -  His exam exhibits no right foot edema. His exam exhibits no left foot edema.  Neurological: He is alert and oriented to person, place, and time.   Skin: Skin is warm and dry. No cyanosis. Nails show no clubbing.   Psychiatric: He has a normal mood and affect. His behavior is normal. Judgment normal. Cognition and memory are normal.       Central Lines/PICC: absent     Results Review:  I personally reviewed the patient's new clinical results.   Lab Results (last 24 hours)     Procedure Component Value Units Date/Time    Urine Culture [[id number]]  (Abnormal) Collected:  04/07/17 1102    Specimen:  Urine from Urine, Catheter Updated:  04/08/17 0841     Urine Culture >100,000 CFU/mL Enterococcus species (A)    S. Pneumo Ag Urine or CSF [[id number]]  (Normal) Collected:  04/08/17 1004    Specimen:  Urine from Urine, Clean Catch Updated:  04/08/17 1115     Strep Pneumo Ag Negative    Legionella Antigen, Urine [[id number]]  (Normal) Collected:  04/08/17 1004    Specimen:  Urine from Urine, Catheter Updated:  04/08/17 1211     LEGIONELLA ANTIGEN, URINE Negative    POC Glucose Fingerstick [[id number]]  (Abnormal) Collected:  04/08/17 1219    Specimen:  Blood Updated:  04/08/17 1230     Glucose 156 (H) mg/dL       Sliding Scale  AdminMeter: [id number]: [id number] CANELA AMADO       Blood Culture [[id number]]  (Normal) Collected:  04/05/17 1712    Specimen:  Blood from Arm, Left Updated:  04/08/17 1801     Blood Culture No growth at 3 days    Blood Culture [[id number]]  (Normal) Collected:  04/05/17 1702    Specimen:  Blood from Arm, Right Updated:  04/08/17 1801     Blood Culture No growth at 3 days    POC Glucose Fingerstick [[id number]]  (Abnormal) Collected:  04/08/17 2053    Specimen:  Blood Updated:  04/08/17 2108     Glucose 182 (H) mg/dL       Sliding Scale AdminMeter: [id number]: [id number] JOEY JINNY       POC Glucose Fingerstick [[id number]]  (Abnormal) Collected:  04/08/17 1845    Specimen:  Blood Updated:  04/08/17 2351     Glucose 243 (H) mg/dL       Sliding Scale AdminMeter: [id number]: [id number] CANELA AMADO       CBC (No Diff) [[id number]]  (Abnormal) Collected:  04/09/17 0300    Specimen:  Blood Updated:  04/09/17 0328     WBC 15.39 (H) 10*3/mm3      RBC 3.26 (L) 10*6/mm3      Hemoglobin 7.7 (L) g/dL      Hematocrit 24.6 (L) %      MCV 75.5 (L) fL      MCH 23.6 (L) pg      MCHC 31.3 (L) g/dL      RDW 15.0 (H) %      RDW-SD 41.5 fl      MPV 8.8 fL      Platelets 236 10*3/mm3     Vancomycin, Trough [[id number]]  (Abnormal) Collected:  04/09/17 0300    Specimen:  Blood Updated:  04/09/17 0355     Vancomycin Trough 23.83 (H) mcg/mL     Renal Function Panel [[id number]]  (Abnormal) Collected:  04/09/17 0300    Specimen:  Blood Updated:  04/09/17 0355     Glucose 159 (H) mg/dL      BUN 60 (H) mg/dL      Creatinine 3.69 (H) mg/dL      Sodium 144 mmol/L      Potassium 4.2 mmol/L      Chloride 114 (H) mmol/L      CO2 17.0 (L) mmol/L      Calcium 8.3 (L) mg/dL      Albumin 2.60 (L) g/dL      Phosphorus 5.5 (H) mg/dL      Anion Gap 13.0 mmol/L      BUN/Creatinine Ratio 16.3     eGFR Non African Amer 17 (L) mL/min/1.73     Blood Gas, Arterial [[id number]]  (Abnormal) Collected:  04/09/17 0435    Specimen:  Arterial  Blood Updated:  04/09/17 0508     Site --      Not performed at this site.        Josias's Test --      Not performed at this site.        pH, Arterial 7.341 (L) pH units      pCO2, Arterial 37.1 mm Hg      pO2, Arterial 95.5 mm Hg      HCO3, Arterial 19.6 (L) mmol/L      Base Excess, Arterial -5.6 (L) mmol/L      O2 Saturation, Arterial 96.9 %      Hemoglobin, Blood Gas 7.9 (L) g/dL      Hematocrit, Blood Gas 23.0 %      CO2 Content 20.8 (L)     Sodium, Arterial 142.9 mmol/L      Potassium, Arterial 4.07 mmol/L      Glucose, Arterial 171 mmol/L      Barometric Pressure for Blood Gas -- mmHg       Not performed at this site.        Modality --      Not performed at this site.        Ionized Calcium 4.7 mg/dL           Results from last 7 days  Lab Units 04/09/17  0435   PH, ARTERIAL pH units 7.341*   PO2 ART mm Hg 95.5   PCO2, ARTERIAL mm Hg 37.1   HCO3 ART mmol/L 19.6*     Lab Results   Component Value Date    BLOODCX No growth at 3 days 04/05/2017     Lab Results   Component Value Date    URINECX >100,000 CFU/mL Enterococcus species (A) 04/07/2017       I independently reviewed the patient's new imaging, including images and reports.  Imaging Results (last 24 hours)     ** No results found for the last 24 hours. **          All medications reviewed.     atorvastatin 80 mg Oral Nightly   clopidogrel 75 mg Oral Daily   ferrous sulfate 324 mg Oral BID With Meals   gabapentin 100 mg Oral Nightly   heparin (porcine) 5,000 Units Subcutaneous Q8H   hydrALAZINE 25 mg Oral BID   insulin aspart 0-7 Units Subcutaneous 4x Daily AC & at Bedtime   ipratropium-albuterol 3 mL Nebulization 4x Daily - RT   levoFLOXacin 500 mg Intravenous Q24H   magic butt ointment  Topical BID   metoprolol tartrate 25 mg Oral Q12H   pantoprazole 40 mg Oral Daily   piperacillin-tazobactam 2.25 g Intravenous Q6H   SITagliptin 50 mg Oral Daily   vancomycin 2,000 mg Intravenous Q24H         Assessment/Plan     ASSESSMENT:  # Acute hypoxemic respiratory  failure  # Probable HAP  # Pulmonary edema/ volume overload  # PH- likely secondary  # NSTEMI- troponin trending down  # Morbid obesity  # Probable GRAHAM/ OHS  # HFpEF  # BRANDO  # DM  # Anemia- likely due to blood draws in setting of kidney dz      PLAN:  -CXR in AM  -Wean O2 to keep sats >89%. No role for routine ABGs.   -CPAP QHS and prn as tolerated  -Cont empiric vanc/ zosyn/ levaquin  -F/u strep pneumo and legionella urine Ags  -Sputum cx with normal gricelda  -Duonebs QID  -Hold steroids for now but may require if pneumonia progresses  -OOB to chair to optimize respiratory mechanics  -Pulmonary toilet  -Agree with continued efforts at diuresis. May well require HD for UF.  -Cardiac management per Dr. Dobbins      VTE Prophylaxis: Heparin TID  Stress Ulcer Prophylaxis: Protonix    Stable for transfer to floor per primary team.    Critical Care Time Spent: 22 minutes  I personally provided care to this critically ill patient as documented above.  Critical care time does not include time spent on separately billed procedures.  None of my critical care time was concurrent with other critical care providers.         This document has been electronically signed by Abby Fu MD on April 9, 2017 7:49 AM      [phone]    EMR Dragon/Transcription disclaimer:     Much of this encounter note is an electronic transcription/translation of spoken language to printed text. The electronic translation of spoken language may permit erroneous, or at times, nonsensical words or phrases to be inadvertently transcribed; Although I have reviewed the note for such errors, some may still exist.

## 2017-04-09 NOTE — PLAN OF CARE
Problem: Patient Care Overview (Adult)  Goal: Plan of Care Review  Outcome: Ongoing (interventions implemented as appropriate)    04/08/17 0538 04/08/17 2000 04/09/17 0620   Coping/Psychosocial Response Interventions   Plan Of Care Reviewed With --  patient;spouse --    Patient Care Overview   Progress improving --  --    Outcome Evaluation   Outcome Summary/Follow up Plan --  --  Patient is tolerating being on the nasal cannula for longer periods of time. Patient states that he is feeling better.        Goal: Adult Individualization and Mutuality  Outcome: Ongoing (interventions implemented as appropriate)  Goal: Discharge Needs Assessment  Outcome: Ongoing (interventions implemented as appropriate)    Problem: Fall Risk (Adult)  Goal: Identify Related Risk Factors and Signs and Symptoms  Outcome: Ongoing (interventions implemented as appropriate)  Goal: Absence of Falls  Outcome: Ongoing (interventions implemented as appropriate)

## 2017-04-09 NOTE — PROGRESS NOTES
LOS: 4 days   Patient Care Team:  Himanshu Tovar MD as PCP - General (Family Medicine)    Chief Complaint: Dyspnea    Subjective      No chest pain. Continuing dyspnea.    Patient Complaints: dyspnea  Patient Denies:  Chest pain  History taken from: patient chart RN    Review of Systems:    Review of Systems   Constitutional: Positive for activity change, chills and fatigue. Negative for appetite change and fever.   HENT: Negative for ear pain and sore throat.    Eyes: Negative for pain and visual disturbance.   Respiratory: Positive for cough and shortness of breath.    Cardiovascular: Positive for leg swelling. Negative for chest pain and palpitations.   Gastrointestinal: Negative for abdominal pain, nausea and vomiting.   Genitourinary: Negative for difficulty urinating and dysuria.   Musculoskeletal: Negative for arthralgias and gait problem.   Skin: Negative for color change and rash.   Neurological: Negative for dizziness, weakness and headaches.   Hematological: Negative for adenopathy. Does not bruise/bleed easily.   Psychiatric/Behavioral: Negative for agitation, confusion and sleep disturbance.       Objective     Vital Signs  Temp:  [98.6 °F (37 °C)] 98.6 °F (37 °C)  Heart Rate:  [] 93  Resp:  [20-23] 20  BP: (103-148)/(56-78) 103/56    Physical Exam:   Physical Exam   Constitutional: He is oriented to person, place, and time. He appears well-developed and well-nourished.   HENT:   Head: Normocephalic and atraumatic.   Eyes: EOM are normal. Pupils are equal, round, and reactive to light.   Neck: Normal range of motion. Neck supple.   Cardiovascular: Normal rate.    Pulmonary/Chest: Effort normal. No respiratory distress. He has no wheezes. He has rales.   Abdominal: Soft. Bowel sounds are normal. He exhibits no distension. There is no tenderness.   Neurological: He is alert and oriented to person, place, and time.   Skin: Skin is warm and dry.        Results Review:       Lab Results (last 24  hours)     Procedure Component Value Units Date/Time    S. Pneumo Ag Urine or CSF [[id number]]  (Normal) Collected:  04/08/17 1004    Specimen:  Urine from Urine, Clean Catch Updated:  04/08/17 1115     Strep Pneumo Ag Negative    Legionella Antigen, Urine [[id number]]  (Normal) Collected:  04/08/17 1004    Specimen:  Urine from Urine, Catheter Updated:  04/08/17 1211     LEGIONELLA ANTIGEN, URINE Negative    POC Glucose Fingerstick [[id number]]  (Abnormal) Collected:  04/08/17 1219    Specimen:  Blood Updated:  04/08/17 1230     Glucose 156 (H) mg/dL       Sliding Scale AdminMeter: [id number]: [id number] CANELA AMADO       Blood Culture [[id number]]  (Normal) Collected:  04/05/17 1712    Specimen:  Blood from Arm, Left Updated:  04/08/17 1801     Blood Culture No growth at 3 days    Blood Culture [[id number]]  (Normal) Collected:  04/05/17 1702    Specimen:  Blood from Arm, Right Updated:  04/08/17 1801     Blood Culture No growth at 3 days    POC Glucose Fingerstick [[id number]]  (Abnormal) Collected:  04/08/17 2053    Specimen:  Blood Updated:  04/08/17 2108     Glucose 182 (H) mg/dL       Sliding Scale AdminMeter: [id number]: [id number] JOEY STEARNS       POC Glucose Fingerstick [[id number]]  (Abnormal) Collected:  04/08/17 1845    Specimen:  Blood Updated:  04/08/17 2351     Glucose 243 (H) mg/dL       Sliding Scale AdminMeter: [id number]: [id number] CANELA AMADO       CBC (No Diff) [[id number]]  (Abnormal) Collected:  04/09/17 0300    Specimen:  Blood Updated:  04/09/17 0328     WBC 15.39 (H) 10*3/mm3      RBC 3.26 (L) 10*6/mm3      Hemoglobin 7.7 (L) g/dL      Hematocrit 24.6 (L) %      MCV 75.5 (L) fL      MCH 23.6 (L) pg      MCHC 31.3 (L) g/dL      RDW 15.0 (H) %      RDW-SD 41.5 fl      MPV 8.8 fL      Platelets 236 10*3/mm3     Vancomycin, Trough [[id number]]  (Abnormal) Collected:  04/09/17 0300    Specimen:  Blood Updated:  04/09/17 0355     Vancomycin Trough 23.83 (H) mcg/mL     Renal  Function Panel [[id number]]  (Abnormal) Collected:  04/09/17 0300    Specimen:  Blood Updated:  04/09/17 0355     Glucose 159 (H) mg/dL      BUN 60 (H) mg/dL      Creatinine 3.69 (H) mg/dL      Sodium 144 mmol/L      Potassium 4.2 mmol/L      Chloride 114 (H) mmol/L      CO2 17.0 (L) mmol/L      Calcium 8.3 (L) mg/dL      Albumin 2.60 (L) g/dL      Phosphorus 5.5 (H) mg/dL      Anion Gap 13.0 mmol/L      BUN/Creatinine Ratio 16.3     eGFR Non African Amer 17 (L) mL/min/1.73     Blood Gas, Arterial [[id number]]  (Abnormal) Collected:  04/09/17 0435    Specimen:  Arterial Blood Updated:  04/09/17 0508     Site --      Not performed at this site.        Josias's Test --      Not performed at this site.        pH, Arterial 7.341 (L) pH units      pCO2, Arterial 37.1 mm Hg      pO2, Arterial 95.5 mm Hg      HCO3, Arterial 19.6 (L) mmol/L      Base Excess, Arterial -5.6 (L) mmol/L      O2 Saturation, Arterial 96.9 %      Hemoglobin, Blood Gas 7.9 (L) g/dL      Hematocrit, Blood Gas 23.0 %      CO2 Content 20.8 (L)     Sodium, Arterial 142.9 mmol/L      Potassium, Arterial 4.07 mmol/L      Glucose, Arterial 171 mmol/L      Barometric Pressure for Blood Gas -- mmHg       Not performed at this site.        Modality --      Not performed at this site.        Ionized Calcium 4.7 mg/dL         .  Medication Review:   Current Facility-Administered Medications   Medication Dose Route Frequency Provider Last Rate Last Dose   • atorvastatin (LIPITOR) tablet 80 mg  80 mg Oral Nightly Bharath Dobbins MD PhD   80 mg at 04/08/17 2043   • clopidogrel (PLAVIX) tablet 75 mg  75 mg Oral Daily Bharath Dobbins MD PhD   75 mg at 04/08/17 0854   • dextrose (D50W) solution 25 g  25 g Intravenous Q15 Min PRN Hayley Valderrama MD       • dextrose (GLUTOSE) oral gel 15 g  15 g Oral Q15 Min PRN Hayley Valderrama MD       • ferrous sulfate EC tablet 324 mg  324 mg Oral BID With Meals Bharath Dobbins MD PhD       • gabapentin (NEURONTIN)  capsule 100 mg  100 mg Oral Nightly Hayley Valderrama MD   100 mg at 04/08/17 2043   • glucagon (human recombinant) (GLUCAGEN DIAGNOSTIC) injection 1 mg  1 mg Subcutaneous Q15 Min PRN Hayley Valderrama MD       • heparin (porcine) 5000 UNIT/ML injection 5,000 Units  5,000 Units Subcutaneous Q8H Bharath Dobbins MD PhD   5,000 Units at 04/09/17 0606   • hydrALAZINE (APRESOLINE) tablet 25 mg  25 mg Oral BID Hayley Valderraam MD   25 mg at 04/08/17 1843   • HYDROcodone-acetaminophen (NORCO)  MG per tablet 1 tablet  1 tablet Oral Q6H PRN Hayley Valderrama MD   1 tablet at 04/08/17 2042   • insulin aspart (novoLOG) injection 0-7 Units  0-7 Units Subcutaneous 4x Daily AC & at Bedtime Hayley Valderrama MD   2 Units at 04/08/17 2054   • ipratropium-albuterol (DUO-NEB) nebulizer solution 3 mL  3 mL Nebulization 4x Daily - RT Abby Fu MD   3 mL at 04/09/17 0720   • levoFLOXacin (LEVAQUIN) 500 mg/100 mL D5W (premix) (LEVAQUIN) 500 mg  500 mg Intravenous Q24H Javed Childers DO   500 mg at 04/08/17 1530   • Linezolid (ZYVOX) 600 mg 300 mL  600 mg Intravenous Q12H Javed Childers DO       • magic butt ointment   Topical BID Hayley Valderrama MD       • metoprolol tartrate (LOPRESSOR) tablet 25 mg  25 mg Oral Q12H Bharath Dobbins MD PhD   25 mg at 04/08/17 2043   • pantoprazole (PROTONIX) EC tablet 40 mg  40 mg Oral Daily Hayley Vadlerrama MD   40 mg at 04/08/17 0854   • piperacillin-tazobactam (ZOSYN) in iso-osmotic dextrose IVPB 2.25 g (premix)  2.25 g Intravenous Q6H Alanna Brand MD 0 mL/hr at 04/09/17 0030 2.25 g at 04/09/17 0606   • pneumococcal polysaccharide 23-valent (PNEUMOVAX-23) vaccine 0.5 mL  0.5 mL Intramuscular During Hospitalization Javed Childers DO       • SITagliptin (JANUVIA) tablet 50 mg  50 mg Oral Daily Hayley Valderrama MD   50 mg at 04/08/17 0854   • sodium chloride (OCEAN) nasal spray 1 spray  1 spray Each Nare PRN Abby Fu MD       • sodium chloride 0.9 % flush 1-10 mL  1-10 mL  Intravenous PRN Hayley Valderrama MD   10 mL at 04/07/17 1250   • sodium chloride 0.9 % flush 1-10 mL  1-10 mL Intravenous PRN Bharath Dobbins MD PhD           Assessment/Plan     Principal Problem:    NSTEMI (non-ST elevated myocardial infarction)  Active Problems:    Type 2 diabetes mellitus    Osteoarthritis of multiple joints    Essential hypertension    Bacterial pneumonia    Morbid obesity with BMI of 50.0-59.9, adult    Acute cystitis    Heart failure with preserved ejection fraction, borderline, class III    Pulmonary hypertension    Plan    NSTEMI -- medically managed per cardiology. No chest pain. Further eval next week when pneumonia/BRANDO improves  Pneumonia--clinically improved  BRANDO on CKD -- nephrology following. BRANDO worsening on Vancomycin. Per pharmacy recommendation will discontinue vancomycin and start Zyvox for resistant staph coverage  Cystitis -- culture positive for e. Faecalis.   Anemia -- started on Fe. Will monitor.          This document has been electronically signed by Javed Childers DO on April 9, 2017 9:33 AM

## 2017-04-09 NOTE — PROGRESS NOTES
"Cardiovascular Daily Progress Note  Bharath Dobbins M.D., Ph.D., [facility]      Subjective     Interval History:   Remains in CCU. Stable dyspnea.     Review of Systems - History obtained from chart review and the patient  Respiratory ROS: positive for - shortness of breath  Cardiovascular ROS: negative for - chest pain    Objective     Vital Sign Min/Max for last 24 hours  Temp  Min: 98.6 °F (37 °C)  Max: 98.6 °F (37 °C)   BP  Min: 103/56  Max: 148/78   Pulse  Min: 80  Max: 105   Resp  Min: 20  Max: 23   SpO2  Min: 92 %  Max: 100 %   Flow (L/min)  Min: 6  Max: 6   Weight  Min: 392 lb (178 kg)  Max: 392 lb (178 kg)     Flowsheet Rows         First Filed Value    Admission Height  72.99\" (185.4 cm) Documented at 04/06/2017 1542    Admission Weight  (!)  386 lb 6.4 oz (175 kg) Documented at 04/05/2017 1659        Last 3 weights    04/06/17  1542 04/07/17  0402 04/09/17  0615   Weight: (!) 388 lb 0.2 oz (176 kg) (!) 390 lb 3.4 oz (177 kg) (!) 392 lb (178 kg)     Body mass index is 51.73 kg/(m^2).    Physical Exam:   GEN: alert, appears stated age and cooperative wearing BiPAP  Body Habitus: morbidly obese  HEENT: Head: Normocephalic, no lesions, without obvious abnormality.  Neck / Thyroid: Supple, no masses, nodes, nodules or enlargement. No arcus senilis, xanthelasma or xanthomas.   JVP: 14 cm of water at 45 degrees HJR: Present    Carotid:  Upstroke: easily palpated bilaterally Volume: Normal.    Carotid Bruit:  None  Subclavian Bruit: Not present.    Chest:  Normal Excursion: Good    I:E: Good  Pulmonary:clear to auscultation, no wheezes, rales or rhonchi, symmetric air entry      Precordium:  No palpable heaves or thrusts. P2 is not palpable.   [place]:  normal size and placement Palpable S4: Not present.   Heart rate: normal  Heart Rhythm: regular     Heart Sounds: S1: normal intensity  S2: increased P2  S3: absent   S4: absent  Opening Snap: absent  A2-OS:  N/A  Pericardial rub: absent    Ejection click: " None      Murmurs: Systolic: none  Diastolic: none  Extremity: 1+ edema  Pulses: Right radial artery has 2+ (normal) pulse and Left radial artery has 2+ (normal) pulse         DATA REVIEWED:    Lab Results (last 24 hours)     Procedure Component Value Units Date/Time    Urine Culture [[id number]]  (Abnormal) Collected:  04/07/17 1102    Specimen:  Urine from Urine, Catheter Updated:  04/08/17 0841     Urine Culture >100,000 CFU/mL Enterococcus species (A)    S. Pneumo Ag Urine or CSF [[id number]]  (Normal) Collected:  04/08/17 1004    Specimen:  Urine from Urine, Clean Catch Updated:  04/08/17 1115     Strep Pneumo Ag Negative    Legionella Antigen, Urine [[id number]]  (Normal) Collected:  04/08/17 1004    Specimen:  Urine from Urine, Catheter Updated:  04/08/17 1211     LEGIONELLA ANTIGEN, URINE Negative    POC Glucose Fingerstick [[id number]]  (Abnormal) Collected:  04/08/17 1219    Specimen:  Blood Updated:  04/08/17 1230     Glucose 156 (H) mg/dL       Sliding Scale AdminMeter: [id number]: [id number] CANELA AMADO       Blood Culture [[id number]]  (Normal) Collected:  04/05/17 1712    Specimen:  Blood from Arm, Left Updated:  04/08/17 1801     Blood Culture No growth at 3 days    Blood Culture [[id number]]  (Normal) Collected:  04/05/17 1702    Specimen:  Blood from Arm, Right Updated:  04/08/17 1801     Blood Culture No growth at 3 days    POC Glucose Fingerstick [[id number]]  (Abnormal) Collected:  04/08/17 2053    Specimen:  Blood Updated:  04/08/17 2108     Glucose 182 (H) mg/dL       Sliding Scale AdminMeter: [id number]: [id number] JOEY STEARNS       POC Glucose Fingerstick [[id number]]  (Abnormal) Collected:  04/08/17 1845    Specimen:  Blood Updated:  04/08/17 2351     Glucose 243 (H) mg/dL       Sliding Scale AdminMeter: [id number]: [id number] CANELA AMADO       CBC (No Diff) [[id number]]  (Abnormal) Collected:  04/09/17 0300    Specimen:  Blood Updated:  04/09/17 0328     WBC 15.39 (H)  10*3/mm3      RBC 3.26 (L) 10*6/mm3      Hemoglobin 7.7 (L) g/dL      Hematocrit 24.6 (L) %      MCV 75.5 (L) fL      MCH 23.6 (L) pg      MCHC 31.3 (L) g/dL      RDW 15.0 (H) %      RDW-SD 41.5 fl      MPV 8.8 fL      Platelets 236 10*3/mm3     Vancomycin, Trough [[id number]]  (Abnormal) Collected:  04/09/17 0300    Specimen:  Blood Updated:  04/09/17 0355     Vancomycin Trough 23.83 (H) mcg/mL     Renal Function Panel [[id number]]  (Abnormal) Collected:  04/09/17 0300    Specimen:  Blood Updated:  04/09/17 0355     Glucose 159 (H) mg/dL      BUN 60 (H) mg/dL      Creatinine 3.69 (H) mg/dL      Sodium 144 mmol/L      Potassium 4.2 mmol/L      Chloride 114 (H) mmol/L      CO2 17.0 (L) mmol/L      Calcium 8.3 (L) mg/dL      Albumin 2.60 (L) g/dL      Phosphorus 5.5 (H) mg/dL      Anion Gap 13.0 mmol/L      BUN/Creatinine Ratio 16.3     eGFR Non African Amer 17 (L) mL/min/1.73     Blood Gas, Arterial [[id number]]  (Abnormal) Collected:  04/09/17 0435    Specimen:  Arterial Blood Updated:  04/09/17 0508     Site --      Not performed at this site.        Josias's Test --      Not performed at this site.        pH, Arterial 7.341 (L) pH units      pCO2, Arterial 37.1 mm Hg      pO2, Arterial 95.5 mm Hg      HCO3, Arterial 19.6 (L) mmol/L      Base Excess, Arterial -5.6 (L) mmol/L      O2 Saturation, Arterial 96.9 %      Hemoglobin, Blood Gas 7.9 (L) g/dL      Hematocrit, Blood Gas 23.0 %      CO2 Content 20.8 (L)     Sodium, Arterial 142.9 mmol/L      Potassium, Arterial 4.07 mmol/L      Glucose, Arterial 171 mmol/L      Barometric Pressure for Blood Gas -- mmHg       Not performed at this site.        Modality --      Not performed at this site.        Ionized Calcium 4.7 mg/dL         Imaging Results (last 24 hours)     Procedure Component Value Units Date/Time    US Venous Doppler Lower Extremity Bilateral (duplex) [[id number]] Collected:  04/06/17 1007     Updated:  04/06/17 1010    Narrative:       Patient Name:  INGRID  GARY HARRIS  Patient ID:  [id number]   Ordering:  SHERON KELLER  Attending:  RIC GUILLORY  Referring:  SHERON KELLER  ------------------------------------------------  Doppler duplex venous examination bilateral lower extremities.  CLINICAL INDICATION: Leg swelling.      COMPARISON: None    FINDINGS:    The common femoral,  femoral, and popliteal veins of the  bilateral     lower extremity are well identified and compress  normally.  Doppler signals are heard either spontaneously or on  distal compression.  No evidence of intraluminal thrombus was  noted.     The visualized posterior calf veins are unremarkable.      Impression:       CONCLUSION:  No evidence of deep venous thrombosis in the common  femoral, superficial femoral veins, or popliteal veins of the  bilateral lower extremities.         Electronically signed by:  Leo Kelly MD  4/6/2017 10:09 AM CDT  Workstation: [id number]    XR Chest 1 View [[id number]] Collected:  04/06/17 2132     Updated:  04/06/17 2137    Narrative:       Exam: AP portable chest    INDICATION: Dyspnea    COMPARISON: 4/5/2017    FINDINGS: AP portable chest. Heart size upper limits of normal.  Interval increase in the bilateral airspace opacity and  infiltrates. Pulmonary vascularity is mildly prominent. Cannot  exclude a small left pleural effusion.      Impression:       Interval increase in the bilateral airspace  opacity/infiltrates    Electronically signed by:  Vicente Nascimento MD  4/6/2017 9:36 PM CDT  Workstation: [id number]        · Left Ventricle: Estimated EF appears to be in the range of 51 - 55%  · Global left ventricular wall motion appears abnormal. The left ventricular cavity is moderate-to-severely dilated  · Left ventricular diastolic dysfunction is noted (grade II w/high LAP) consistent with pseudonormalization. Elevated left atrial pressure  · Right Ventricle: Normal wall thickness, systolic function and septal motion noted. Right ventricular cavity is  mildly dilated.  · The inferior vena cava is dilated. Normal IVC inspiratory collapse of greater than 50% noted.  · Mild mitral valve regurgitation is present.  · Trace to mild tricuspid valve regurgitation is present.  · Calculated right ventricular systolic pressure from tricuspid regurgitation is 66 mmHg  · Severe pulmonary hypertension is present.  · There is no evidence of pericardial effusion.    LE Duplex  FINDINGS:     The common femoral, femoral, and popliteal veins of the  bilateral lower extremity are well identified and compress  normally. Doppler signals are heard either spontaneously or on  distal compression. No evidence of intraluminal thrombus was  noted.      The visualized posterior calf veins are unremarkable.    Assessment/Plan      1. ACS: NSTEMI. The patient is CP free. The patient is currently hemodynamically stable.   -ASA 81mg; Plavix 75mg PO daily.   -Atorvastatin 80mg  -Lopressor  -Deferred consideration for LHC pending improvement in renal function    2. PAH/PVH. WHO Group likely 3.3/3.4; FC: Class 3 - comfortable at rest but have symptoms with less-than-ordinary effort..    RV status: adversely adapted. The patient is currently hypervolemic and perfused physiologic state.  He has had interval right ventricular dilation and increased PA systolic pressure.  -No current indication for PAH-specific medications  -Will plan on RHC on Monday, no pulmonary angiography  -Will need out-pt sleep eval    3. HFpEF. Patient clearly has elevated left-sided filling pressures on physical examination.   -Will plan on RHC on Monday  -Antibiotics per primary team  -I will not dose Lasix this AM and defer to Nephro    4. Anemia, presumed iron def.  -Start Iron   -Send Iron profile      Thank you for allowing me to participate in the care of your patient.  If I can be of any further assistance, please do not hesitate contact me.          This document has been electronically signed by Bharath Dobbins,  MD PhD on April 9, 2017 7:34 AM

## 2017-04-10 ENCOUNTER — APPOINTMENT (OUTPATIENT)
Dept: GENERAL RADIOLOGY | Facility: HOSPITAL | Age: 62
End: 2017-04-10

## 2017-04-10 ENCOUNTER — TELEPHONE (OUTPATIENT)
Dept: GASTROENTEROLOGY | Facility: CLINIC | Age: 62
End: 2017-04-10

## 2017-04-10 ENCOUNTER — APPOINTMENT (OUTPATIENT)
Dept: CT IMAGING | Facility: HOSPITAL | Age: 62
End: 2017-04-10

## 2017-04-10 PROBLEM — D64.9 ANEMIA: Status: ACTIVE | Noted: 2017-04-10

## 2017-04-10 LAB
ABO GROUP BLD: NORMAL
ALBUMIN SERPL-MCNC: 2.8 G/DL (ref 3.4–4.8)
ALBUMIN/GLOB SERPL: 1 G/DL (ref 1.1–1.8)
ALP SERPL-CCNC: 77 U/L (ref 38–126)
ALT SERPL W P-5'-P-CCNC: 17 U/L (ref 21–72)
ANION GAP SERPL CALCULATED.3IONS-SCNC: 15 MMOL/L (ref 5–15)
AST SERPL-CCNC: 36 U/L (ref 17–59)
BACTERIA SPEC AEROBE CULT: NORMAL
BACTERIA SPEC AEROBE CULT: NORMAL
BILIRUB SERPL-MCNC: 0.2 MG/DL (ref 0.2–1.3)
BLD GP AB SCN SERPL QL: NEGATIVE
BUN BLD-MCNC: 62 MG/DL (ref 7–21)
BUN/CREAT SERPL: 15.9 (ref 7–25)
CALCIUM SPEC-SCNC: 8 MG/DL (ref 8.4–10.2)
CHLORIDE SERPL-SCNC: 109 MMOL/L (ref 95–110)
CO2 SERPL-SCNC: 19 MMOL/L (ref 22–31)
CREAT BLD-MCNC: 3.89 MG/DL (ref 0.7–1.3)
DEPRECATED RDW RBC AUTO: 42.1 FL (ref 35.1–43.9)
ERYTHROCYTE [DISTWIDTH] IN BLOOD BY AUTOMATED COUNT: 15.2 % (ref 11.5–14.5)
GFR SERPL CREATININE-BSD FRML MDRD: 16 ML/MIN/1.73 (ref 60–113)
GLOBULIN UR ELPH-MCNC: 2.9 GM/DL (ref 2.3–3.5)
GLUCOSE BLD-MCNC: 209 MG/DL (ref 60–100)
GLUCOSE BLDC GLUCOMTR-MCNC: 161 MG/DL (ref 70–130)
GLUCOSE BLDC GLUCOMTR-MCNC: 170 MG/DL (ref 70–130)
GLUCOSE BLDC GLUCOMTR-MCNC: 188 MG/DL (ref 70–130)
GLUCOSE BLDC GLUCOMTR-MCNC: 210 MG/DL (ref 70–130)
HCT VFR BLD AUTO: 23.5 % (ref 39–49)
HGB BLD-MCNC: 7.2 G/DL (ref 13.7–17.3)
Lab: NORMAL
MCH RBC QN AUTO: 23.2 PG (ref 26.5–34)
MCHC RBC AUTO-ENTMCNC: 30.6 G/DL (ref 31.5–36.3)
MCV RBC AUTO: 75.6 FL (ref 80–98)
OXYGEN SATURATION, PULMONARY ARTERY: 65.3 % (ref 94–100)
PHOSPHATE SERPL-MCNC: 5.6 MG/DL (ref 2.4–4.4)
PLATELET # BLD AUTO: 199 10*3/MM3 (ref 150–450)
PMV BLD AUTO: 8.6 FL (ref 8–12)
POTASSIUM BLD-SCNC: 4 MMOL/L (ref 3.5–5.1)
PROT SERPL-MCNC: 5.7 G/DL (ref 6.3–8.6)
RBC # BLD AUTO: 3.11 10*6/MM3 (ref 4.37–5.74)
RH BLD: NEGATIVE
SODIUM BLD-SCNC: 143 MMOL/L (ref 137–145)
WBC NRBC COR # BLD: 14.76 10*3/MM3 (ref 3.2–9.8)

## 2017-04-10 PROCEDURE — 94799 UNLISTED PULMONARY SVC/PX: CPT

## 2017-04-10 PROCEDURE — 82810 BLOOD GASES O2 SAT ONLY: CPT | Performed by: INTERNAL MEDICINE

## 2017-04-10 PROCEDURE — 25010000002 LINEZOLID 600 MG/300ML SOLUTION: Performed by: FAMILY MEDICINE

## 2017-04-10 PROCEDURE — 85027 COMPLETE CBC AUTOMATED: CPT | Performed by: FAMILY MEDICINE

## 2017-04-10 PROCEDURE — 94660 CPAP INITIATION&MGMT: CPT

## 2017-04-10 PROCEDURE — 86900 BLOOD TYPING SEROLOGIC ABO: CPT

## 2017-04-10 PROCEDURE — 25010000002 PIPERACILLIN SOD-TAZOBACTAM PER 1 G: Performed by: INTERNAL MEDICINE

## 2017-04-10 PROCEDURE — 36430 TRANSFUSION BLD/BLD COMPNT: CPT

## 2017-04-10 PROCEDURE — 84100 ASSAY OF PHOSPHORUS: CPT | Performed by: FAMILY MEDICINE

## 2017-04-10 PROCEDURE — 4A1239Z MONITORING OF CARDIAC OUTPUT, PERCUTANEOUS APPROACH: ICD-10-PCS | Performed by: INTERNAL MEDICINE

## 2017-04-10 PROCEDURE — 86850 RBC ANTIBODY SCREEN: CPT | Performed by: INTERNAL MEDICINE

## 2017-04-10 PROCEDURE — 63710000001 INSULIN ASPART PER 5 UNITS: Performed by: FAMILY MEDICINE

## 2017-04-10 PROCEDURE — 99232 SBSQ HOSP IP/OBS MODERATE 35: CPT | Performed by: INTERNAL MEDICINE

## 2017-04-10 PROCEDURE — C1769 GUIDE WIRE: HCPCS | Performed by: INTERNAL MEDICINE

## 2017-04-10 PROCEDURE — 86900 BLOOD TYPING SEROLOGIC ABO: CPT | Performed by: INTERNAL MEDICINE

## 2017-04-10 PROCEDURE — 25010000002 FUROSEMIDE PER 20 MG: Performed by: INTERNAL MEDICINE

## 2017-04-10 PROCEDURE — 93451 RIGHT HEART CATH: CPT | Performed by: INTERNAL MEDICINE

## 2017-04-10 PROCEDURE — 74176 CT ABD & PELVIS W/O CONTRAST: CPT

## 2017-04-10 PROCEDURE — P9016 RBC LEUKOCYTES REDUCED: HCPCS

## 2017-04-10 PROCEDURE — 82962 GLUCOSE BLOOD TEST: CPT

## 2017-04-10 PROCEDURE — 71010 HC CHEST PA OR AP: CPT

## 2017-04-10 PROCEDURE — 4A023N6 MEASUREMENT OF CARDIAC SAMPLING AND PRESSURE, RIGHT HEART, PERCUTANEOUS APPROACH: ICD-10-PCS | Performed by: INTERNAL MEDICINE

## 2017-04-10 PROCEDURE — 25010000002 HEPARIN (PORCINE) PER 1000 UNITS: Performed by: INTERNAL MEDICINE

## 2017-04-10 PROCEDURE — 86923 COMPATIBILITY TEST ELECTRIC: CPT

## 2017-04-10 PROCEDURE — C1894 INTRO/SHEATH, NON-LASER: HCPCS | Performed by: INTERNAL MEDICINE

## 2017-04-10 PROCEDURE — 99232 SBSQ HOSP IP/OBS MODERATE 35: CPT | Performed by: FAMILY MEDICINE

## 2017-04-10 PROCEDURE — 80053 COMPREHEN METABOLIC PANEL: CPT | Performed by: FAMILY MEDICINE

## 2017-04-10 PROCEDURE — 86901 BLOOD TYPING SEROLOGIC RH(D): CPT | Performed by: INTERNAL MEDICINE

## 2017-04-10 RX ORDER — LIDOCAINE HYDROCHLORIDE 20 MG/ML
INJECTION, SOLUTION INFILTRATION; PERINEURAL AS NEEDED
Status: DISCONTINUED | OUTPATIENT
Start: 2017-04-10 | End: 2017-04-10 | Stop reason: HOSPADM

## 2017-04-10 RX ORDER — FUROSEMIDE 10 MG/ML
100 INJECTION INTRAMUSCULAR; INTRAVENOUS ONCE
Status: COMPLETED | OUTPATIENT
Start: 2017-04-10 | End: 2017-04-10

## 2017-04-10 RX ORDER — SODIUM CHLORIDE 9 MG/ML
INJECTION, SOLUTION INTRAVENOUS
Status: COMPLETED
Start: 2017-04-10 | End: 2017-04-10

## 2017-04-10 RX ORDER — BUMETANIDE 0.25 MG/ML
2 INJECTION INTRAMUSCULAR; INTRAVENOUS EVERY 12 HOURS
Status: DISCONTINUED | OUTPATIENT
Start: 2017-04-10 | End: 2017-04-11

## 2017-04-10 RX ADMIN — SODIUM CHLORIDE 10 ML/HR: 9 INJECTION, SOLUTION INTRAVENOUS at 14:26

## 2017-04-10 RX ADMIN — TAZOBACTAM SODIUM AND PIPERACILLIN SODIUM 2.25 G: 250; 2 INJECTION, SOLUTION INTRAVENOUS at 05:50

## 2017-04-10 RX ADMIN — IPRATROPIUM BROMIDE AND ALBUTEROL SULFATE 3 ML: 2.5; .5 SOLUTION RESPIRATORY (INHALATION) at 06:53

## 2017-04-10 RX ADMIN — TAZOBACTAM SODIUM AND PIPERACILLIN SODIUM 2.25 G: 250; 2 INJECTION, SOLUTION INTRAVENOUS at 12:32

## 2017-04-10 RX ADMIN — Medication: at 08:18

## 2017-04-10 RX ADMIN — Medication 10 ML: at 08:24

## 2017-04-10 RX ADMIN — BUMETANIDE 2 MG: 0.25 INJECTION, SOLUTION INTRAMUSCULAR; INTRAVENOUS at 23:56

## 2017-04-10 RX ADMIN — HYDROCODONE BITARTRATE AND ACETAMINOPHEN 1 TABLET: 10; 325 TABLET ORAL at 08:15

## 2017-04-10 RX ADMIN — HYDROCODONE BITARTRATE AND ACETAMINOPHEN 1 TABLET: 10; 325 TABLET ORAL at 21:17

## 2017-04-10 RX ADMIN — HEPARIN SODIUM 5000 UNITS: 5000 INJECTION, SOLUTION INTRAVENOUS; SUBCUTANEOUS at 05:50

## 2017-04-10 RX ADMIN — HYDRALAZINE HYDROCHLORIDE 25 MG: 25 TABLET ORAL at 18:39

## 2017-04-10 RX ADMIN — IPRATROPIUM BROMIDE AND ALBUTEROL SULFATE 3 ML: 2.5; .5 SOLUTION RESPIRATORY (INHALATION) at 16:33

## 2017-04-10 RX ADMIN — ATORVASTATIN CALCIUM 80 MG: 40 TABLET, FILM COATED ORAL at 21:16

## 2017-04-10 RX ADMIN — FUROSEMIDE 100 MG: 10 INJECTION, SOLUTION INTRAVENOUS at 08:23

## 2017-04-10 RX ADMIN — TAZOBACTAM SODIUM AND PIPERACILLIN SODIUM 2.25 G: 250; 2 INJECTION, SOLUTION INTRAVENOUS at 23:56

## 2017-04-10 RX ADMIN — LINEZOLID 600 MG: 600 INJECTION, SOLUTION INTRAVENOUS at 11:16

## 2017-04-10 RX ADMIN — GABAPENTIN 100 MG: 100 CAPSULE ORAL at 21:17

## 2017-04-10 RX ADMIN — HEPARIN SODIUM 5000 UNITS: 5000 INJECTION, SOLUTION INTRAVENOUS; SUBCUTANEOUS at 14:08

## 2017-04-10 RX ADMIN — INSULIN ASPART 2 UNITS: 100 INJECTION, SOLUTION INTRAVENOUS; SUBCUTANEOUS at 18:38

## 2017-04-10 RX ADMIN — HEPARIN SODIUM 5000 UNITS: 5000 INJECTION, SOLUTION INTRAVENOUS; SUBCUTANEOUS at 21:16

## 2017-04-10 RX ADMIN — INSULIN ASPART 3 UNITS: 100 INJECTION, SOLUTION INTRAVENOUS; SUBCUTANEOUS at 21:16

## 2017-04-10 RX ADMIN — TAZOBACTAM SODIUM AND PIPERACILLIN SODIUM 2.25 G: 250; 2 INJECTION, SOLUTION INTRAVENOUS at 18:54

## 2017-04-10 RX ADMIN — Medication: at 18:40

## 2017-04-10 RX ADMIN — IPRATROPIUM BROMIDE AND ALBUTEROL SULFATE 3 ML: 2.5; .5 SOLUTION RESPIRATORY (INHALATION) at 12:54

## 2017-04-10 RX ADMIN — INSULIN ASPART 2 UNITS: 100 INJECTION, SOLUTION INTRAVENOUS; SUBCUTANEOUS at 08:17

## 2017-04-10 RX ADMIN — IPRATROPIUM BROMIDE AND ALBUTEROL SULFATE 3 ML: 2.5; .5 SOLUTION RESPIRATORY (INHALATION) at 19:43

## 2017-04-10 RX ADMIN — METOPROLOL TARTRATE 25 MG: 25 TABLET ORAL at 08:15

## 2017-04-10 RX ADMIN — BUMETANIDE 2 MG: 0.25 INJECTION, SOLUTION INTRAMUSCULAR; INTRAVENOUS at 12:28

## 2017-04-10 RX ADMIN — FERROUS SULFATE TAB EC 324 MG (65 MG FE EQUIVALENT) 324 MG: 324 (65 FE) TABLET DELAYED RESPONSE at 08:15

## 2017-04-10 RX ADMIN — INSULIN ASPART 2 UNITS: 100 INJECTION, SOLUTION INTRAVENOUS; SUBCUTANEOUS at 11:23

## 2017-04-10 RX ADMIN — HYDRALAZINE HYDROCHLORIDE 25 MG: 25 TABLET ORAL at 08:17

## 2017-04-10 RX ADMIN — PANTOPRAZOLE SODIUM 40 MG: 40 TABLET, DELAYED RELEASE ORAL at 08:15

## 2017-04-10 RX ADMIN — LINEZOLID 600 MG: 600 INJECTION, SOLUTION INTRAVENOUS at 21:22

## 2017-04-10 RX ADMIN — FERROUS SULFATE TAB EC 324 MG (65 MG FE EQUIVALENT) 324 MG: 324 (65 FE) TABLET DELAYED RESPONSE at 18:38

## 2017-04-10 RX ADMIN — Medication 10 ML: at 08:19

## 2017-04-10 RX ADMIN — CLOPIDOGREL BISULFATE 75 MG: 75 TABLET ORAL at 08:15

## 2017-04-10 RX ADMIN — HYDROCODONE BITARTRATE AND ACETAMINOPHEN 1 TABLET: 10; 325 TABLET ORAL at 14:08

## 2017-04-10 RX ADMIN — SITAGLIPTIN 50 MG: 25 TABLET, FILM COATED ORAL at 08:16

## 2017-04-10 NOTE — PROGRESS NOTES
"[facility]  [address] [place] [address]. [address]  T - [phone]   - [phone]     Progress Note          PATIENT  DEMOGRAPHICS   PATIENT NAME: Blake Chavez                      PHYSICIAN: Alanna Brand MD  : 1955  MRN: [id number]   LOS: 5 days    Patient Care Team:  Himanshu Tovar MD as PCP - General (Family Medicine)  Subjective   SUBJECTIVE   Still soa plan for RHC.        Objective   OBJECTIVE   Vital Signs  Heart Rate:  [] 92  Resp:  [16-22] 22  BP: ()/(54-82) 113/63    Flowsheet Rows         First Filed Value    Admission Height  72.99\" (185.4 cm) Documented at 2017 1542    Admission Weight  (!)  386 lb 6.4 oz (175 kg) Documented at 2017 1659           I/O last 3 completed shifts:  In: 2275 [P.O.:625; I.V.:1000; IV Piggyback:650]  Out:  [Urine:1980]    PHYSICAL EXAM    Physical Exam   Air entry bilaterally decrease at bases  Heart regular rate and rhythm no gallop.  Abdomen obese soft nontender distended bowel sounds are present.  Extremities trace edema in both ankles    RESULTS   Results Review:      Results from last 7 days  Lab Units 04/10/17  0155 17  0435 17  0300  17  0216  17  0600  17  1147   SODIUM mmol/L 143  --  144  --  146*  --  148*  < > 149*   SODIUM, ARTERIAL mmol/L  --  142.9  --   < >  --   --   --   < >  --    POTASSIUM mmol/L 4.0  --  4.2  --  4.2  < > 4.4  < > 4.8   CHLORIDE mmol/L 109  --  114*  --  113*  --  113*  < > 113*   TOTAL CO2 mmol/L 19.0*  --  17.0*  --  20.0*  --  20.0*  < > 20.0*   BUN mg/dL 62*  --  60*  --  54*  --  48*  < > 40*   CREATININE mg/dL 3.89*  --  3.69*  --  3.36*  --  3.22*  < > 2.99*   CALCIUM mg/dL 8.0*  --  8.3*  --  8.4  --  9.0  < > 9.5   BILIRUBIN mg/dL 0.2  --   --   --   --   --  0.4  --  0.4   ALK PHOS U/L 77  --   --   --   --   --  106  --  120   ALT (SGPT) U/L 17*  --   --   --   --   --  12*  --  12*   AST (SGOT) U/L 36  --   --   --   -- "   --  23  --  19   GLUCOSE mg/dL 209*  --  159*  --  104*  --  164*  < > 180*   GLUCOSE, ARTERIAL mmol/L  --  171  --   < >  --   --   --   < >  --    < > = values in this interval not displayed.    Estimated Creatinine Clearance: 33 mL/min (by C-G formula based on Cr of 3.89).      Results from last 7 days  Lab Units 04/10/17  0155 04/09/17  0300 04/08/17  0216   PHOSPHORUS mg/dL 5.6* 5.5* 5.1*               Results from last 7 days  Lab Units 04/10/17  0155 04/09/17  0300 04/08/17  0216 04/07/17  0600 04/06/17  0245   WBC 10*3/mm3 14.76* 15.39* 11.96* 16.39* 15.23*   HEMOGLOBIN g/dL 7.2* 7.7* 7.2* 9.4* 9.0*   PLATELETS 10*3/mm3 199 236 213 261 267         Results from last 7 days  Lab Units 04/05/17  1712   INR  1.28*         Imaging Results (last 24 hours)     ** No results found for the last 24 hours. **           MEDICATIONS      [MAR Hold] atorvastatin 80 mg Oral Nightly   [MAR Hold] clopidogrel 75 mg Oral Daily   [MAR Hold] ferrous sulfate 324 mg Oral BID With Meals   gabapentin 100 mg Oral Nightly   [MAR Hold] heparin (porcine) 5,000 Units Subcutaneous Q8H   hydrALAZINE 25 mg Oral BID   [MAR Hold] insulin aspart 0-7 Units Subcutaneous 4x Daily AC & at Bedtime   [MAR Hold] ipratropium-albuterol 3 mL Nebulization 4x Daily - RT   [MAR Hold] Linezolid 600 mg Intravenous Q12H   [MAR Hold] magic butt ointment  Topical BID   metoprolol tartrate 25 mg Oral Q12H   [MAR Hold] pantoprazole 40 mg Oral Daily   [MAR Hold] piperacillin-tazobactam 2.25 g Intravenous Q6H   [MAR Hold] SITagliptin 50 mg Oral Daily          Assessment/Plan   ASSESSMENT / PLAN    Principal Problem:    NSTEMI (non-ST elevated myocardial infarction)  Active Problems:    Type 2 diabetes mellitus    Osteoarthritis of multiple joints    Essential hypertension    Bacterial pneumonia    Morbid obesity with BMI of 50.0-59.9, adult    Acute cystitis    Heart failure with preserved ejection fraction, borderline, class III    Pulmonary  hypertension    Assessment  1.  Acute kidney injury, renal function continues to worsen, currently non-oliguric.on ckd 3/4 prior cr came down to 2.6  2.  Non-ST elevation myocardial infarction.  3.  Anemia. hgb 7.2  Plan  1.  Appears hypervolemic on exam and cxr/ s/p lasix. Will keep bumex from this evening. Likely need RRT in next 24 -48 hrs. Plan for RHC and will check PCWP  2.  Check ct abdomen pelvis non contrast today  3. Transfuse 2 units prbc today will dw pt about risks and benefits of blood.            Alanna Brand MD  04/10/17  9:05 AM

## 2017-04-10 NOTE — NURSING NOTE
Patient has been seen previously magic butt was ordered. Pt being seen for buttock wounds by Dr. Sommer.

## 2017-04-10 NOTE — TELEPHONE ENCOUNTER
04/10/2017, 0900 - Patient's wife, Christina, telephoned stating patient, Blake Chavez, 1955, was in need of cancelling Colonoscopy scheduled for completion Thursday, April 17 at 8:00 a.m. as patient is currently in the Intensive Care Unit at [place], [place] due to Heart Attack experienced Friday, March 31, 2017.  Wife further states patient was diagnosed with Pneumonia Wednesday, April 5, 2017 at Urgent Care and is also experiencing difficulty with Kidneys.  0910 - Spoke with Rachelle with Endoscopy Department.  Patient's Colonoscopy cancelled.  Wife instructed to have patient contact office to reschedule Colonoscopy when patient's health will allow completion procedure.  Wife verbalized understanding.

## 2017-04-10 NOTE — PLAN OF CARE
Problem: Patient Care Overview (Adult)  Goal: Plan of Care Review  Outcome: Ongoing (interventions implemented as appropriate)    04/10/17 0187   Coping/Psychosocial Response Interventions   Plan Of Care Reviewed With patient;significant other   Patient Care Overview   Progress no change   Outcome Evaluation   Outcome Summary/Follow up Plan answered questions regarding Low Sodium diet

## 2017-04-10 NOTE — PROGRESS NOTES
CRITICAL CARE PROGRESS NOTE  Abby Fu MD    [facility]  4/10/2017        Blake Chavez  [id number]  1955  61 y.o. male            LOS: 5 days   Javed Childers DO    Chief Complaint/Reason for visit: F/u acute hypoxic respiratory failure, HAP    Subjective     Interval History:   History taken from: patient/ chart    O2 weaned to 5lpm NC yesterday. Still on CPAP until he is adjusted in bed. Cr remains up. Positive ~1.5L over past day. Weight up to 394. Cough and dyspnea stable.    Review of Systems:   Review of Systems   Constitutional: Negative for fever.   Respiratory: Positive for cough and shortness of breath. Negative for wheezing.    Cardiovascular: Positive for leg swelling. Negative for chest pain and palpitations.   Gastrointestinal: Negative for abdominal pain.     All systems were reviewed and negative except as noted above in the HPI.    Medical history, surgical history, social history, family history reviewed    Objective     Intake/Output:    Intake/Output Summary (Last 24 hours) at 04/10/17 0728  Last data filed at 04/10/17 0652   Gross per 24 hour   Intake             2275 ml   Output             1055 ml   Net             1220 ml       Nutrition: PO    Infusions:       Respiratory:       Vital Sign Min/Max for last 24 hours:  Temp  Min: 99.7 °F (37.6 °C)  Max: 99.7 °F (37.6 °C)   BP  Min: 91/54  Max: 157/74   Pulse  Min: 80  Max: 104   Resp  Min: 16  Max: 22   SpO2  Min: 90 %  Max: 100 %   Flow (L/min)  Min: 5  Max: 6   Weight  Min: 394 lb (179 kg)  Max: 394 lb (179 kg)     Physical Exam:  99.7 °F (37.6 °C) (Temporal Artery ) 86 113/63 22 100% (!) 394 lb (179 kg) Body mass index is 51.99 kg/(m^2).  Physical Exam   Constitutional: He is oriented to person, place, and time. Vital signs are normal. He appears well-developed and well-nourished.   morbidly obese   HENT:   Head: Normocephalic and atraumatic.   Nose: Nose normal.   Mouth/Throat:  Mucous membranes are normal.   Eyes: EOM are normal.   Neck:   Large neck circumference   Cardiovascular: Normal rate, regular rhythm and normal heart sounds.  Exam reveals no gallop.    No murmur heard.  Pulmonary/Chest: Effort normal. No accessory muscle usage. No respiratory distress. He has no wheezes. He has no rhonchi.   Abdominal: Soft. Normal appearance and bowel sounds are normal. There is no tenderness.   Morbidly obese   Musculoskeletal:        Right ankle: He exhibits swelling.        Left ankle: He exhibits swelling.        Right lower leg: He exhibits swelling.        Left lower leg: He exhibits swelling.   Neurological: He is alert and oriented to person, place, and time.   Skin: Skin is warm and dry. No cyanosis. Nails show no clubbing.   Psychiatric: He has a normal mood and affect. His behavior is normal. Judgment normal. Cognition and memory are normal.       Central Lines/PICC: absent     Results Review:  I personally reviewed the patient's new clinical results.   Lab Results (last 24 hours)     Procedure Component Value Units Date/Time    Urine Culture [[id number]]  (Abnormal)  (Susceptibility) Collected:  04/07/17 1102    Specimen:  Urine from Urine, Catheter Updated:  04/09/17 0939     Urine Culture >100,000 CFU/mL Enterococcus faecalis (A)    Susceptibility      Enterococcus faecalis     JAQUELINE     Gentamicin High Level Synergy <=500 ug/ml Susceptible     Levofloxacin 4 ug/ml Intermediate     Nitrofurantoin <=32 ug/ml Susceptible     Penicillin G 2 ug/ml Susceptible     Tetracycline >8 ug/ml Resistant     Vancomycin 1 ug/ml Susceptible                    Iron Profile [[id number]]  (Abnormal) Collected:  04/09/17 0945    Specimen:  Blood Updated:  04/09/17 1122     Iron <10 (L) mcg/dL      TIBC 178 (L) mcg/dL      Iron Saturation -- %       Unable to calculate.       POC Glucose Fingerstick [[id number]]  (Abnormal) Collected:  04/09/17 1134    Specimen:  Blood Updated:  04/09/17 1148     Glucose 225  (H) mg/dL       Sliding Scale AdminMeter: [id number]: [id number] CANELA AMADO       Extra Tubes [[id number]] Collected:  04/09/17 0945    Specimen:  Blood from Blood, Venous Line Updated:  04/09/17 1401    Narrative:       The following orders were created for panel order Extra Tubes.  Procedure                               Abnormality         Status                     ---------                               -----------         ------                     Lavender Top[[id number]]                                      Final result                 Please view results for these tests on the individual orders.    Lavender Top [[id number]] Collected:  04/09/17 0945    Specimen:  Blood Updated:  04/09/17 1401     Extra Tube hold for add-on      Auto resulted       Blood Culture [[id number]]  (Normal) Collected:  04/05/17 1712    Specimen:  Blood from Arm, Left Updated:  04/09/17 1801     Blood Culture No growth at 4 days    Blood Culture [[id number]]  (Normal) Collected:  04/05/17 1702    Specimen:  Blood from Arm, Right Updated:  04/09/17 1801     Blood Culture No growth at 4 days    POC Glucose Fingerstick [[id number]]  (Abnormal) Collected:  04/09/17 1810    Specimen:  Blood Updated:  04/09/17 1827     Glucose 226 (H) mg/dL       Sliding Scale AdminMeter: [id number]: [id number] CNAELA AMADO       POC Glucose Fingerstick [[id number]]  (Abnormal) Collected:  04/09/17 0945    Specimen:  Blood Updated:  04/09/17 1915     Glucose 221 (H) mg/dL       Sliding Scale AdminMeter: [id number]: [id number] CANELA AMADO       POC Glucose Fingerstick [[id number]]  (Abnormal) Collected:  04/09/17 2137    Specimen:  Blood Updated:  04/09/17 2300     Glucose 241 (H) mg/dL       Sliding Scale AdminMeter: [id number]: [id number] JOEY STEARNS       CBC (No Diff) [[id number]]  (Abnormal) Collected:  04/10/17 0155    Specimen:  Blood Updated:  04/10/17 0206     WBC 14.76 (H) 10*3/mm3      RBC 3.11 (L) 10*6/mm3       Hemoglobin 7.2 (L) g/dL      Hematocrit 23.5 (L) %      MCV 75.6 (L) fL      MCH 23.2 (L) pg      MCHC 30.6 (L) g/dL      RDW 15.2 (H) %      RDW-SD 42.1 fl      MPV 8.6 fL      Platelets 199 10*3/mm3     Phosphorus [[id number]]  (Abnormal) Collected:  04/10/17 0155    Specimen:  Blood Updated:  04/10/17 0214     Phosphorus 5.6 (H) mg/dL     Comprehensive Metabolic Panel [[id number]]  (Abnormal) Collected:  04/10/17 0155    Specimen:  Blood Updated:  04/10/17 0221     Glucose 209 (H) mg/dL      BUN 62 (H) mg/dL      Creatinine 3.89 (H) mg/dL      Sodium 143 mmol/L      Potassium 4.0 mmol/L      Chloride 109 mmol/L      CO2 19.0 (L) mmol/L      Calcium 8.0 (L) mg/dL      Total Protein 5.7 (L) g/dL      Albumin 2.80 (L) g/dL      ALT (SGPT) 17 (L) U/L      AST (SGOT) 36 U/L      Alkaline Phosphatase 77 U/L      Total Bilirubin 0.2 mg/dL      eGFR Non African Amer 16 (L) mL/min/1.73      Globulin 2.9 gm/dL      A/G Ratio 1.0 (L) g/dL      BUN/Creatinine Ratio 15.9     Anion Gap 15.0 mmol/L           Results from last 7 days  Lab Units 04/09/17  0435   PH, ARTERIAL pH units 7.341*   PO2 ART mm Hg 95.5   PCO2, ARTERIAL mm Hg 37.1   HCO3 ART mmol/L 19.6*     Lab Results   Component Value Date    BLOODCX No growth at 4 days 04/05/2017     Lab Results   Component Value Date    URINECX >100,000 CFU/mL Enterococcus faecalis (A) 04/07/2017       I independently reviewed the patient's new imaging, including images and reports.  Imaging Results (last 24 hours)     Procedure Component Value Units Date/Time    XR Chest 1 View [[id number]] Collected:  04/10/17 0635     Updated:  04/10/17 0637    Narrative:       Exam: AP portable chest    INDICATION: Respiratory failure    COMPARISON: 4/8/2017    FINDINGS: AP portable chest. Heart is enlarged. The pulmonary  vascularity is prominent. No change in bilateral patchy airspace  opacity. Cannot exclude small effusions.      Impression:       No significant interval change.    Electronically  signed by:  Vicente Nascimento MD  4/10/2017 6:36 AM  CDT Workstation: [id number]            All medications reviewed.     atorvastatin 80 mg Oral Nightly   clopidogrel 75 mg Oral Daily   ferrous sulfate 324 mg Oral BID With Meals   gabapentin 100 mg Oral Nightly   heparin (porcine) 5,000 Units Subcutaneous Q8H   hydrALAZINE 25 mg Oral BID   insulin aspart 0-7 Units Subcutaneous 4x Daily AC & at Bedtime   ipratropium-albuterol 3 mL Nebulization 4x Daily - RT   levoFLOXacin 500 mg Intravenous Q24H   Linezolid 600 mg Intravenous Q12H   magic butt ointment  Topical BID   metoprolol tartrate 25 mg Oral Q12H   pantoprazole 40 mg Oral Daily   piperacillin-tazobactam 2.25 g Intravenous Q6H   SITagliptin 50 mg Oral Daily         Assessment/Plan     ASSESSMENT:  # Acute hypoxemic respiratory failure  # Probable HAP  # Pulmonary edema/ volume overload  # PH- likely secondary  # NSTEMI- troponin trending down  # Morbid obesity  # Probable GRAHAM/ OHS  # HFpEF  # BRANDO  # DM  # Anemia- likely due to blood draws in setting of kidney dz      PLAN:  -Wean O2 to keep sats >89%. No role for routine ABGs.   -CPAP QHS and prn as tolerated  -Cont empiric vanc/ zosyn. Stop levaquin as strep pneumo and legionella negative  -Sputum cx with normal gricelda  -Duonebs QID  -OOB to chair to optimize respiratory mechanics  -Pulmonary toilet  -Needs fluid off. Defer to Nephrology re: diuresis vs HD for UF.  -Cardiac management per Dr. Dobbins      VTE Prophylaxis: Heparin TID  Stress Ulcer Prophylaxis: Protonix    Stable for transfer to floor per primary team.    Critical Care Time Spent: 18 minutes  I personally provided care to this critically ill patient as documented above.  Critical care time does not include time spent on separately billed procedures.  None of my critical care time was concurrent with other critical care providers.         This document has been electronically signed by Abby Fu MD on April 10, 2017 7:28 AM       [phone]    FARRUKH Orozco/Transcription disclaimer:     Much of this encounter note is an electronic transcription/translation of spoken language to printed text. The electronic translation of spoken language may permit erroneous, or at times, nonsensical words or phrases to be inadvertently transcribed; Although I have reviewed the note for such errors, some may still exist.

## 2017-04-10 NOTE — PROGRESS NOTES
"Cardiovascular Daily Progress Note  Bharath Dobbins M.D., Ph.D., [facility]      Subjective     Interval History:   Remains in CCU. Stable dyspnea. Received 1 L NS O/N, per nephro.    Review of Systems - History obtained from chart review and the patient  Respiratory ROS: positive for - shortness of breath  Cardiovascular ROS: negative for - chest pain    Objective     Vital Sign Min/Max for last 24 hours  No Data Recorded   BP  Min: 91/54  Max: 157/74   Pulse  Min: 80  Max: 104   Resp  Min: 16  Max: 22   SpO2  Min: 92 %  Max: 100 %   Flow (L/min)  Min: 5  Max: 6   Weight  Min: 394 lb (179 kg)  Max: 394 lb (179 kg)     Flowsheet Rows         First Filed Value    Admission Height  72.99\" (185.4 cm) Documented at 04/06/2017 1542    Admission Weight  (!)  386 lb 6.4 oz (175 kg) Documented at 04/05/2017 1659        Last 3 weights    04/07/17  0402 04/09/17  0615 04/10/17  0518   Weight: (!) 390 lb 3.4 oz (177 kg) (!) 392 lb (178 kg) (!) 394 lb (179 kg)     Body mass index is 51.99 kg/(m^2).    Physical Exam:   GEN: alert, appears stated age and cooperative wearing BiPAP  Body Habitus: morbidly obese  HEENT: Head: Normocephalic, no lesions, without obvious abnormality.  Neck / Thyroid: Supple, no masses, nodes, nodules or enlargement. No arcus senilis, xanthelasma or xanthomas.   JVP: 15 cm of water at 45 degrees HJR: Present    Carotid:  Upstroke: easily palpated bilaterally Volume: Normal.    Carotid Bruit:  None  Subclavian Bruit: Not present.    Chest:  Normal Excursion: Good    I:E: Good  Pulmonary:clear to auscultation, no wheezes, rales or rhonchi, symmetric air entry      Precordium:  No palpable heaves or thrusts. P2 is not palpable.   [place]:  normal size and placement Palpable S4: Not present.   Heart rate: normal  Heart Rhythm: regular     Heart Sounds: S1: normal intensity  S2: increased P2  S3: absent   S4: absent  Opening Snap: absent  A2-OS:  N/A  Pericardial rub: absent    Ejection click: None      Murmurs: " Systolic: none  Diastolic: none  Extremity: 1+ edema  Pulses: Right radial artery has 2+ (normal) pulse and Left radial artery has 2+ (normal) pulse         DATA REVIEWED:    Lab Results (last 24 hours)     Procedure Component Value Units Date/Time    Urine Culture [[id number]]  (Abnormal)  (Susceptibility) Collected:  04/07/17 1102    Specimen:  Urine from Urine, Catheter Updated:  04/09/17 0939     Urine Culture >100,000 CFU/mL Enterococcus faecalis (A)    Susceptibility      Enterococcus faecalis     JAQUELINE     Gentamicin High Level Synergy <=500 ug/ml Susceptible     Levofloxacin 4 ug/ml Intermediate     Nitrofurantoin <=32 ug/ml Susceptible     Penicillin G 2 ug/ml Susceptible     Tetracycline >8 ug/ml Resistant     Vancomycin 1 ug/ml Susceptible                    Iron Profile [[id number]]  (Abnormal) Collected:  04/09/17 0945    Specimen:  Blood Updated:  04/09/17 1122     Iron <10 (L) mcg/dL      TIBC 178 (L) mcg/dL      Iron Saturation -- %       Unable to calculate.       POC Glucose Fingerstick [[id number]]  (Abnormal) Collected:  04/09/17 1134    Specimen:  Blood Updated:  04/09/17 1148     Glucose 225 (H) mg/dL       Sliding Scale AdminMeter: [id number]: [id number] CANELA AMADO       Extra Tubes [[id number]] Collected:  04/09/17 0945    Specimen:  Blood from Blood, Venous Line Updated:  04/09/17 1401    Narrative:       The following orders were created for panel order Extra Tubes.  Procedure                               Abnormality         Status                     ---------                               -----------         ------                     Lavender Top[[id number]]                                      Final result                 Please view results for these tests on the individual orders.    Lavender Top [[id number]] Collected:  04/09/17 0945    Specimen:  Blood Updated:  04/09/17 1401     Extra Tube hold for add-on      Auto resulted       Blood Culture [[id number]]  (Normal) Collected:   04/05/17 1712    Specimen:  Blood from Arm, Left Updated:  04/09/17 1801     Blood Culture No growth at 4 days    Blood Culture [[id number]]  (Normal) Collected:  04/05/17 1702    Specimen:  Blood from Arm, Right Updated:  04/09/17 1801     Blood Culture No growth at 4 days    POC Glucose Fingerstick [[id number]]  (Abnormal) Collected:  04/09/17 1810    Specimen:  Blood Updated:  04/09/17 1827     Glucose 226 (H) mg/dL       Sliding Scale AdminMeter: [id number]: [id number] CANELA AMADO       POC Glucose Fingerstick [[id number]]  (Abnormal) Collected:  04/09/17 0945    Specimen:  Blood Updated:  04/09/17 1915     Glucose 221 (H) mg/dL       Sliding Scale AdminMeter: [id number]: [id number] CANELA AMADO       POC Glucose Fingerstick [[id number]]  (Abnormal) Collected:  04/09/17 2137    Specimen:  Blood Updated:  04/09/17 2300     Glucose 241 (H) mg/dL       Sliding Scale AdminMeter: [id number]: [id number] JOEY JINNY       CBC (No Diff) [[id number]]  (Abnormal) Collected:  04/10/17 0155    Specimen:  Blood Updated:  04/10/17 0206     WBC 14.76 (H) 10*3/mm3      RBC 3.11 (L) 10*6/mm3      Hemoglobin 7.2 (L) g/dL      Hematocrit 23.5 (L) %      MCV 75.6 (L) fL      MCH 23.2 (L) pg      MCHC 30.6 (L) g/dL      RDW 15.2 (H) %      RDW-SD 42.1 fl      MPV 8.6 fL      Platelets 199 10*3/mm3     Phosphorus [[id number]]  (Abnormal) Collected:  04/10/17 0155    Specimen:  Blood Updated:  04/10/17 0214     Phosphorus 5.6 (H) mg/dL     Comprehensive Metabolic Panel [[id number]]  (Abnormal) Collected:  04/10/17 0155    Specimen:  Blood Updated:  04/10/17 0221     Glucose 209 (H) mg/dL      BUN 62 (H) mg/dL      Creatinine 3.89 (H) mg/dL      Sodium 143 mmol/L      Potassium 4.0 mmol/L      Chloride 109 mmol/L      CO2 19.0 (L) mmol/L      Calcium 8.0 (L) mg/dL      Total Protein 5.7 (L) g/dL      Albumin 2.80 (L) g/dL      ALT (SGPT) 17 (L) U/L      AST (SGOT) 36 U/L      Alkaline Phosphatase 77 U/L      Total  Bilirubin 0.2 mg/dL      eGFR Non African Amer 16 (L) mL/min/1.73      Globulin 2.9 gm/dL      A/G Ratio 1.0 (L) g/dL      BUN/Creatinine Ratio 15.9     Anion Gap 15.0 mmol/L         Imaging Results (last 24 hours)     Procedure Component Value Units Date/Time    US Venous Doppler Lower Extremity Bilateral (duplex) [[id number]] Collected:  04/06/17 1007     Updated:  04/06/17 1010    Narrative:       Patient Name:  INGRID HARRIS  Patient ID:  [id number]   Ordering:  SHERON KELLER  Attending:  RIC GUILLORY  Referring:  SHERON KELLER  ------------------------------------------------  Doppler duplex venous examination bilateral lower extremities.  CLINICAL INDICATION: Leg swelling.      COMPARISON: None    FINDINGS:    The common femoral,  femoral, and popliteal veins of the  bilateral     lower extremity are well identified and compress  normally.  Doppler signals are heard either spontaneously or on  distal compression.  No evidence of intraluminal thrombus was  noted.     The visualized posterior calf veins are unremarkable.      Impression:       CONCLUSION:  No evidence of deep venous thrombosis in the common  femoral, superficial femoral veins, or popliteal veins of the  bilateral lower extremities.         Electronically signed by:  Leo Kelly MD  4/6/2017 10:09 AM CDT  Workstation: [id number]    XR Chest 1 View [[id number]] Collected:  04/06/17 2132     Updated:  04/06/17 2137    Narrative:       Exam: AP portable chest    INDICATION: Dyspnea    COMPARISON: 4/5/2017    FINDINGS: AP portable chest. Heart size upper limits of normal.  Interval increase in the bilateral airspace opacity and  infiltrates. Pulmonary vascularity is mildly prominent. Cannot  exclude a small left pleural effusion.      Impression:       Interval increase in the bilateral airspace  opacity/infiltrates    Electronically signed by:  Vicente Nascimento MD  4/6/2017 9:36 PM CDT  Workstation: [id number]        · Left Ventricle:  Estimated EF appears to be in the range of 51 - 55%  · Global left ventricular wall motion appears abnormal. The left ventricular cavity is moderate-to-severely dilated  · Left ventricular diastolic dysfunction is noted (grade II w/high LAP) consistent with pseudonormalization. Elevated left atrial pressure  · Right Ventricle: Normal wall thickness, systolic function and septal motion noted. Right ventricular cavity is mildly dilated.  · The inferior vena cava is dilated. Normal IVC inspiratory collapse of greater than 50% noted.  · Mild mitral valve regurgitation is present.  · Trace to mild tricuspid valve regurgitation is present.  · Calculated right ventricular systolic pressure from tricuspid regurgitation is 66 mmHg  · Severe pulmonary hypertension is present.  · There is no evidence of pericardial effusion.    LE Duplex  FINDINGS:     The common femoral, femoral, and popliteal veins of the  bilateral lower extremity are well identified and compress  normally. Doppler signals are heard either spontaneously or on  distal compression. No evidence of intraluminal thrombus was  noted.      The visualized posterior calf veins are unremarkable.    Assessment/Plan      1. ACS: NSTEMI. The patient is CP free. The patient is currently hemodynamically stable.   -ASA 81mg; Plavix 75mg PO daily.   -Atorvastatin 80mg  -Lopressor  -Deferred consideration for LHC pending improvement in renal function    2. PAH/PVH. WHO Group likely 3.3/3.4; FC: Class 3 - comfortable at rest but have symptoms with less-than-ordinary effort..    RV status: adversely adapted. The patient is currently hypervolemic and perfused physiologic state.  He has had interval right ventricular dilation and increased PA systolic pressure.  -The indications, risks and benefits of diagnostic right  heart cardiac catheterization, angiography, conscious sedation, and possible blood transfusion were discussed in detail with the patient. The increased risks that  are present with pulmonary arterial hypertension with right heart catheterization were emphasized. I have cited a complication rate of 1.1% with total adverse events. This includes a 0.3% risk of inpatient admission due to a complication. I have cited a 0.5% risk of fatality. This includes the risk of PA perforation. I have cited a risk of hematoma, vagal reactions and pneumothorax as <1%. Pulmonary vasoreactivity testing, if indicated, can cause hypotension, bronchospasm, chest pain and SOB. I have also discussed the possible risks, though low, of heart block and the need for emergency venous pacing. Additionally, I went over that if a rare complication requires a thoracotomy or median sternotomy that this would be performed emergently by CTS. The patient is willing to proceed. ASA class is ASA 4 - Patient with severe systemic disease that is a constant threat to life; Mallampati is III (soft and hard palate and base of uvula visible).  -RHC, RUE access, possible VD challenge  -No current indication for PAH-specific medications  -Will need out-pt sleep eval  -Discontinue IV fluids and give 100mg Lasix now    3. HFpEF. Patient clearly has elevated left-sided filling pressures on physical examination.   -Will plan on RHC on Monday  -Antibiotics per primary team  -Diuretics, as above    4. Iron deficiency anemia.   -Iron  -F/U Nephro recs      Thank you for allowing me to participate in the care of your patient.  If I can be of any further assistance, please do not hesitate contact me.          This document has been electronically signed by Bharath Dobbins MD PhD on April 10, 2017 8:04 AM

## 2017-04-10 NOTE — CONSULTS
[facility] Failure [facility]   LOS: 5 days   Patient Care Team:  Himanshu Tovar MD as PCP - General (Family Medicine)    Referring Provider: Dr Bharath Dobbins    Date of Consult: 04/10/17      Subjective   Mr Chavez is a 61 year old male with admission 04/05/2017 regarding shortness of breath.  Mr Chavez underwent cystoscopy per Dr Lopez with (R) ureteral stent removal on 04/04/2017.   A few days later while at home, Mr Chavez developed chest pain and took some Xochitl-[place] opting out of ED presentation for evaluation.  Mr Chavez had a myocardial perfusion study performed in December 2016 which demonstrated abnormalities however due to asymptomatic complaints in follow up - medical management has been pursued.  Upon presentation for shortness of breath, SBP was noted in the 90s. Troponins were abnormal, thus UFH and ASA were initiated. VQ scan was low probability.  In addition, there was evidence of BRANDO/CKD, thus invasive procedure re: NSTEMI/abnormal EKG was avoided.  Medical therapy: DAPT, UFH, BB. Statin therapy and echocardiogram was the plan initiated.  RHC was performed earlier today, along with TTE results - diagnosis HFpEF.   In speaking with Mr and Ms Chavez today, they consume mainly pre-prepared fast foods as they do no cook much at home.    Interval History:   Patient Denies: chest pain    History  Past Medical History:   Diagnosis Date   • Acute cystitis    • BPH (benign prostatic hypertrophy)    • Calculus of kidney and ureter     recently passed      • Chest pain    • Crohn's disease    • Edema     unspecified - bilateral lower extremities     • Essential hypertension    • Hip pain    • Hypertensive disorder    • Knee pain    • Morbid obesity    • Nausea    • Nuclear senile cataract    • Osteoarthritis of multiple joints    • Paraumbilical hernia    • Right hand fracture     as a teen   • Severe concentric left ventricular hypertrophy    • Type 2 diabetes mellitus      no BDR    • Type 2 diabetes  mellitus      no BDR    ,   Past Surgical History:   Procedure Laterality Date   • COLONOSCOPY  ; 11/03/0216    Diverticulosis in sigmoid colon. 1 polyp in sigmoid colon; removed by cold biopsy polypectomy. Hemorrhoids found   • CYSTOSCOPY, URETEROSCOPY, RETROGRADE PYELOGRAM, STENT INSERTION Bilateral 3/29/2017    Procedure: CYSTOSCOPY, BILATERAL RETROGRADE, URETEROSCOPY, LASER LITHOTRIPSY, STENT PLACEMENT; LASER LITHOTRIPSY BLADDER CALCULI;  Surgeon: Blake Lopez MD;  Location: [facility];  Service:    • HYDROCELE EXCISION / REPAIR     • INJECTION OF MEDICATION  04/18/2016    Kenalog   • UPPER GASTROINTESTINAL ENDOSCOPY  11/03/2016   ,   Family History   Problem Relation Age of Onset   • Cancer Other    • Diabetes Other    • Stroke Other    • Other Other      Colon problems    • Diabetes Mother    • Heart failure Mother    • Colon cancer Father    • Breast cancer Paternal Grandmother    ,   Social History   Substance Use Topics   • Smoking status: Never Smoker   • Smokeless tobacco: Never Used   • Alcohol use No   ,   Prescriptions Prior to Admission   Medication Sig Dispense Refill Last Dose   • diltiaZEM (TIAZAC) 360 MG 24 hr capsule TAKE 1 CAPSULE EVERY DAY 30 capsule 5 3/29/2017 at 1000   • FE BISGLY-FE [id number] PO Take  by mouth Daily.   3/28/2017 at 2200   • gabapentin (NEURONTIN) 100 MG capsule TAKE 1 CAPSULE AT BEDTIME FOR FOOT PAIN 30 capsule 5 3/28/2017 at 2200   • Glucosamine 500 MG capsule Take 500 mg by mouth 2 (Two) Times a Day.   4/5/2017 at Unknown time   • glyBURIDE (DIAbeta) 5 MG tablet TAKE 2 TABLETS TWICE DAILY BEFORE MEALS (Patient taking differently: TAKE 2 TABLETS BID) 120 tablet 5 3/28/2017 at 2200   • hydrALAZINE (APRESOLINE) 25 MG tablet Take 1 tablet by mouth 3 (Three) Times a Day. (Patient taking differently: Take 25 mg by mouth 2 (Two) Times a Day.) 90 tablet 3 3/29/2017 at 1000   • lisinopril (PRINIVIL,ZESTRIL) 20 MG tablet TAKE 1 TABLET BY MOUTH EVERY DAY FOR BLOOD PRESSURE  30 tablet 5 3/28/2017 at 1000   • nateglinide (STARLIX) 120 MG tablet TAKE 1 TABLET BY MOUTH BEFORE MEALS (Patient taking differently: daily) 90 tablet 3 3/28/2017 at 1000   • Omega-3 Fatty Acids (FISH OIL) 1000 MG capsule capsule Take 1,000 mg by mouth Daily With Breakfast.   3/28/2017 at 2200   • ONE TOUCH ULTRA TEST test strip USE THREE TIMES DAILY 100 each 11 Taking   • POLY-IRON 150 150 MG capsule TAKE 1 CAPSULE TWICE DAILY 60 capsule 3 3/28/2017 at 2200   • pravastatin (PRAVACHOL) 40 MG tablet Take 1 tablet by mouth Daily. 31 tablet 6 3/28/2017 at 1000   • promethazine (PHENERGAN) 12.5 MG tablet Take 1 tablet by mouth Every 6 (Six) Hours As Needed (prn). 30 tablet 1 More than a month at Unknown time   • SITagliptin (JANUVIA) 100 MG tablet Take 1 tablet by mouth Daily. (Patient taking differently: Take 50 mg by mouth Daily.) 30 tablet 5 3/28/2017 at 1000   • VITAMIN D, ERGOCALCIFEROL, PO Take  by mouth Daily.   3/28/2017 at 1000   • amoxicillin-clavulanate (AUGMENTIN) 875-125 MG per tablet Take 1 tablet by mouth 2 (Two) Times a Day for 10 days. 20 tablet 0    • [] doxycycline (VIBRAMYCIN) 100 MG capsule Take 1 capsule by mouth Daily for 7 days. 7 capsule 0    • HYDROcodone-acetaminophen (NORCO)  MG per tablet Take 1 tablet by mouth Every 6 (Six) Hours As Needed for Moderate Pain (4-6).      • omeprazole (priLOSEC) 40 MG capsule Take 40 mg by mouth Daily.   3/29/2017 at 1000   • oxyCODONE-acetaminophen (PERCOCET) 7.5-325 MG per tablet Take 1-2 tablets by mouth Every 4 (Four) Hours As Needed (Pain). 15 tablet 0    • pantoprazole (PROTONIX) 40 MG EC tablet Take 40 mg by mouth Daily.   not started    Review of patient's allergies indicates no known allergies.    Current Facility-Administered Medications:   •  atorvastatin (LIPITOR) tablet 80 mg, 80 mg, Oral, Nightly, Bharath Dobbins MD PhD, 80 mg at 172  •  bumetanide (BUMEX) injection 2 mg, 2 mg, Intravenous, Q12H, Alanna Brand MD, 2  mg at 04/10/17 1228  •  clopidogrel (PLAVIX) tablet 75 mg, 75 mg, Oral, Daily, Bharath Dobbins MD PhD, 75 mg at 04/10/17 0815  •  dextrose (D50W) solution 25 g, 25 g, Intravenous, Q15 Min PRN, Hayley Valderrama MD  •  dextrose (GLUTOSE) oral gel 15 g, 15 g, Oral, Q15 Min PRN, Hayley Valderrama MD  •  ferrous sulfate EC tablet 324 mg, 324 mg, Oral, BID With Meals, Bharath Dobbins MD PhD, 324 mg at 04/10/17 0815  •  gabapentin (NEURONTIN) capsule 100 mg, 100 mg, Oral, Nightly, Hayley Valderrama MD, 100 mg at 04/09/17 2136  •  glucagon (human recombinant) (GLUCAGEN DIAGNOSTIC) injection 1 mg, 1 mg, Subcutaneous, Q15 Min PRN, Hayley Valderrama MD  •  heparin (porcine) 5000 UNIT/ML injection 5,000 Units, 5,000 Units, Subcutaneous, Q8H, Bharath Dobbins MD PhD, 5,000 Units at 04/10/17 0550  •  hydrALAZINE (APRESOLINE) tablet 25 mg, 25 mg, Oral, BID, Hayley Valderrama MD, 25 mg at 04/10/17 0817  •  HYDROcodone-acetaminophen (NORCO)  MG per tablet 1 tablet, 1 tablet, Oral, Q6H PRN, Hayley Valderrama MD, 1 tablet at 04/10/17 0815  •  insulin aspart (novoLOG) injection 0-7 Units, 0-7 Units, Subcutaneous, 4x Daily AC & at Bedtime, Hayley Valderrama MD, 2 Units at 04/10/17 1123  •  ipratropium-albuterol (DUO-NEB) nebulizer solution 3 mL, 3 mL, Nebulization, 4x Daily - RT, Abby Fu MD, 3 mL at 04/10/17 1254  •  Linezolid (ZYVOX) 600 mg 300 mL, 600 mg, Intravenous, Q12H, Javed Childers DO, Last Rate: 300 mL/hr at 04/10/17 1116, 600 mg at 04/10/17 1116  •  magic butt ointment, , Topical, BID, Hayley Valderrama MD  •  metoprolol tartrate (LOPRESSOR) tablet 25 mg, 25 mg, Oral, Q12H, Bharath Dobbins MD PhD, 25 mg at 04/10/17 0815  •  pantoprazole (PROTONIX) EC tablet 40 mg, 40 mg, Oral, Daily, Hayley Valderrama MD, 40 mg at 04/10/17 0815  •  piperacillin-tazobactam (ZOSYN) in iso-osmotic dextrose IVPB 2.25 g (premix), 2.25 g, Intravenous, Q6H, Last Rate: 0 mL/hr at 04/10/17 0652, 2.25 g at 04/10/17 1232 **AND**  "[COMPLETED] vancomycin (VANCOCIN) 2,500 mg in sodium chloride 0.9 % 500 mL IVPB, 2,500 mg, Intravenous, Once, 2,500 mg at 04/06/17 0008 **AND** [DISCONTINUED] Pharmacy to dose vancomycin, , Does not apply, Continuous PRN, Alanna Brand MD  •  pneumococcal polysaccharide 23-valent (PNEUMOVAX-23) vaccine 0.5 mL, 0.5 mL, Intramuscular, During Hospitalization, Javed Childers DO  •  sodium chloride (OCEAN) nasal spray 1 spray, 1 spray, Each Nare, PRN, Abby Fu MD  •  sodium chloride 0.9 % flush 1-10 mL, 1-10 mL, Intravenous, PRN, Hayley Valderrama MD, 10 mL at 04/10/17 0824  •  sodium chloride 0.9 % flush 1-10 mL, 1-10 mL, Intravenous, PRN, Bharath Dobbins MD PhD    Review of Systems:   Review of Systems   Constitutional: Positive for fatigue (chronic). Negative for chills and fever.   Respiratory: Negative for cough. Shortness of breath: NYHA Class IV.    Cardiovascular: Positive for leg swelling. Negative for chest pain and palpitations.   Neurological: Negative for syncope and light-headedness.   Hematological: Does not bruise/bleed easily.     Objective     Vital Sign Min/Max for last 24 hours  Temp  Min: 99.3 °F (37.4 °C)  Max: 99.3 °F (37.4 °C)   BP  Min: 91/54  Max: 156/82   Pulse  Min: 80  Max: 102   Resp  Min: 16  Max: 22   SpO2  Min: 92 %  Max: 100 %   Flow (L/min)  Min: 5  Max: 5   Weight  Min: 394 lb (179 kg)  Max: 394 lb (179 kg)     Flowsheet Rows         First Filed Value    Admission Height  72.99\" (185.4 cm) Documented at 04/06/2017 1542    Admission Weight  (!)  386 lb 6.4 oz (175 kg) Documented at 04/05/2017 1659        Last 3 weights    04/07/17  0402 04/09/17  0615 04/10/17  0518   Weight: (!) 390 lb 3.4 oz (177 kg) (!) 392 lb (178 kg) (!) 394 lb (179 kg)     Physical Exam:  Physical Exam   Constitutional: He appears well-developed and well-nourished. No distress.   HENT:   Head: Normocephalic and atraumatic.   Neck: JVD (12cm @ 45 degrees) present.   Cardiovascular: Normal rate, " regular rhythm and normal heart sounds.  Exam reveals no gallop.    No murmur heard.  Pulmonary/Chest: Effort normal. No respiratory distress.   Abdominal: He exhibits no distension.   Musculoskeletal: He exhibits edema (L>R plus exogenous obesity).   Skin: Skin is warm and dry. He is not diaphoretic.   Psychiatric: He has a normal mood and affect. His behavior is normal. Judgment and thought content normal.   Vitals reviewed.    Results Review:   Lab Results (last 24 hours)     Procedure Component Value Units Date/Time    Extra Tubes [[id number]] Collected:  04/09/17 0945    Specimen:  Blood from Blood, Venous Line Updated:  04/09/17 1401    Narrative:       The following orders were created for panel order Extra Tubes.  Procedure                               Abnormality         Status                     ---------                               -----------         ------                     Lavender Top[[id number]]                                      Final result                 Please view results for these tests on the individual orders.    Lavender Top [[id number]] Collected:  04/09/17 0945    Specimen:  Blood Updated:  04/09/17 1401     Extra Tube hold for add-on      Auto resulted       Blood Culture [[id number]]  (Normal) Collected:  04/05/17 1712    Specimen:  Blood from Arm, Left Updated:  04/09/17 1801     Blood Culture No growth at 4 days    Blood Culture [[id number]]  (Normal) Collected:  04/05/17 1702    Specimen:  Blood from Arm, Right Updated:  04/09/17 1801     Blood Culture No growth at 4 days    POC Glucose Fingerstick [[id number]]  (Abnormal) Collected:  04/09/17 1810    Specimen:  Blood Updated:  04/09/17 1827     Glucose 226 (H) mg/dL       Sliding Scale AdminMeter: [id number]: [id number] [facility]       POC Glucose Fingerstick [[id number]]  (Abnormal) Collected:  04/09/17 0945    Specimen:  Blood Updated:  04/09/17 1915     Glucose 221 (H) mg/dL       Sliding Scale AdminMeter:  [id number]: [id number] Imler AMADO       POC Glucose Fingerstick [[id number]]  (Abnormal) Collected:  04/09/17 2137    Specimen:  Blood Updated:  04/09/17 2300     Glucose 241 (H) mg/dL       Sliding Scale AdminMeter: [id number]: [id number] JOEY STEARNS       CBC (No Diff) [[id number]]  (Abnormal) Collected:  04/10/17 0155    Specimen:  Blood Updated:  04/10/17 0206     WBC 14.76 (H) 10*3/mm3      RBC 3.11 (L) 10*6/mm3      Hemoglobin 7.2 (L) g/dL      Hematocrit 23.5 (L) %      MCV 75.6 (L) fL      MCH 23.2 (L) pg      MCHC 30.6 (L) g/dL      RDW 15.2 (H) %      RDW-SD 42.1 fl      MPV 8.6 fL      Platelets 199 10*3/mm3     Phosphorus [[id number]]  (Abnormal) Collected:  04/10/17 0155    Specimen:  Blood Updated:  04/10/17 0214     Phosphorus 5.6 (H) mg/dL     Comprehensive Metabolic Panel [[id number]]  (Abnormal) Collected:  04/10/17 0155    Specimen:  Blood Updated:  04/10/17 0221     Glucose 209 (H) mg/dL      BUN 62 (H) mg/dL      Creatinine 3.89 (H) mg/dL      Sodium 143 mmol/L      Potassium 4.0 mmol/L      Chloride 109 mmol/L      CO2 19.0 (L) mmol/L      Calcium 8.0 (L) mg/dL      Total Protein 5.7 (L) g/dL      Albumin 2.80 (L) g/dL      ALT (SGPT) 17 (L) U/L      AST (SGOT) 36 U/L      Alkaline Phosphatase 77 U/L      Total Bilirubin 0.2 mg/dL      eGFR Non African Amer 16 (L) mL/min/1.73      Globulin 2.9 gm/dL      A/G Ratio 1.0 (L) g/dL      BUN/Creatinine Ratio 15.9     Anion Gap 15.0 mmol/L     O2 Sat Run-Pulmonary Artery [[id number]]  (Abnormal) Collected:  04/10/17 1042    Specimen:  Blood Updated:  04/10/17 1044     Oxygen Saturation, Pulmonary Artery 65.3 (L) %         Imaging Results (last 24 hours)     Procedure Component Value Units Date/Time    XR Chest 1 View [[id number]] Collected:  04/10/17 0635     Updated:  04/10/17 0637    Narrative:       Exam: AP portable chest    INDICATION: Respiratory failure    COMPARISON: 4/8/2017    FINDINGS: AP portable chest. Heart is enlarged. The  pulmonary  vascularity is prominent. No change in bilateral patchy airspace  opacity. Cannot exclude small effusions.      Impression:       No significant interval change.    Electronically signed by:  Vicente Nascimento MD  4/10/2017 6:36 AM  CDT Workstation: [id number]        Echocardiogram: 04/06/2017  · Left Ventricle: Estimated EF appears to be in the range of 51 - 55%  · Global left ventricular wall motion appears abnormal. The left ventricular cavity is moderate-to-severely dilated  · Left ventricular diastolic dysfunction is noted (grade II w/high LAP) consistent with pseudonormalization. Elevated left atrial pressure  · Right Ventricle: Normal wall thickness, systolic function and septal motion noted. Right ventricular cavity is mildly dilated.  · The inferior vena cava is dilated. Normal IVC inspiratory collapse of greater than 50% noted.  · Mild mitral valve regurgitation is present.  · Trace to mild tricuspid valve regurgitation is present.  · Calculated right ventricular systolic pressure from tricuspid regurgitation is 66 mmHg  · Severe pulmonary hypertension is present.  · There is no evidence of pericardial effusion.      RH: 04/10/2017  · 1. HFpEF and perfusion status consistent with hypervolemia  · 2. Pulmonary venous hypertension with an in-proportion DPG  · 3. Elevated left-sided filling pressures  · 4. Elevated right-sided filling pressures  · 5. RV EF is normal with elevated RVSP        MPS: 12/08/2016  IMAGING FINDINGS:  1. Study quality is adequate. Left ventricle is normal.  2. Artifacts: The gated study is suboptimal. Anterior and  diaphragmatic attenuation.  3. SPECT imaging on the attenuated corrected images show perfusion to  the inferior wall both on the rest and stress images. There is  significant artifact because of extracardiac uptake. Anterior wall  has perfusion on the rest images which decreases with stress  however. Nonattenuated corrected images show adequate perfusion of  the  anterior wall. The images seen on the attenuated corrected  images are likely secondary to artifact and consistent with  anterior attenuation. Numerous views were compared both on the  attenuated and nonattenuated images for the septum. Attenuated  corrected images showed decreased perfusion during stress. However,  the nonattenuated corrected images show adequate perfusion to the  septum. Quantitative analysis was concerning for the base of the  inferior lateral wall with inducibility; however, nonattenuated  corrected images show lateral wall is perfused. This likely is a  normal study with numerous areas of artifact.  4. Gated SPECT imaging of the left ventricle showed the ejection  fraction was 60% without evidence of T.I.D.. There was a septal  motion consistent with an incomplete right bundle branch block.      IMPRESSION- Probably normal with numerous areas of artifact.      1. Probably normal myocardial perfusion.  2. Normal left ventricular systolic function. Ejection fraction 60%.      Assessment/Plan     Principal Problem:    NSTEMI (non-ST elevated myocardial infarction)  Active Problems:    Type 2 diabetes mellitus    Osteoarthritis of multiple joints    Essential hypertension    Bacterial pneumonia    Morbid obesity with BMI of 50.0-59.9, adult    Acute cystitis    Heart failure with preserved ejection fraction, borderline, class III    Pulmonary hypertension    Mr Chavez presents with findings of HFpEF for which there is clinical evidence of hypervolemia.     Presented an overview of heart failure, expected course, considerations, risk factors and exacerbation prevention.  Recommended daily weight monitoring.  Recommended restricted dietary sodium intake not to exceed 2000 mg daily;  Discussed patient action plan for heart failure;  Recommended avoiding NSAIDs use.  Discussed warning signs requiring additional medical attention for heart failure.  Will certainly need continuation of medical management  plus self skill management education.    Plan for disposition:Where: HF Clinic within 3-5 days of hospital discharge with Annette PARKER or Gloria PARKER per Attending.         This document has been electronically signed by KEITH Weiner on April 10, 2017 1:03 PM

## 2017-04-10 NOTE — PROGRESS NOTES
"   LOS: 5 days   Patient Care Team:  Himanshu Tovar MD as PCP - General (Family Medicine)    Subjective     Subjective:  Symptoms:  (Some urethral burning with flank pain. Meier with clear, yellow urine. ).    Diet:  No nausea or vomiting.    Pain:  He complains of pain that is mild.  He reports pain is unchanged.  Pain is requiring pain medication.        History taken from: patient chart family    Objective     Vital Signs  Temp:  [98.5 °F (36.9 °C)-99.4 °F (37.4 °C)] 98.9 °F (37.2 °C)  Heart Rate:  [] 89  Resp:  [15-23] 20  BP: ()/(51-78) 119/65    Objective:  General Appearance:  In no acute distress.    Vital signs: (most recent): Blood pressure 119/65, pulse 89, temperature 98.9 °F (37.2 °C), temperature source Temporal Artery , resp. rate 20, height 72.99\" (185.4 cm), weight (!) 394 lb (179 kg), SpO2 100 %.  Vital signs are normal.  No fever.    Output: Producing urine (Meier, clear yellow).    HEENT: Normal HEENT exam.    Lungs:  Normal respiratory rate and normal effort.  He is not in respiratory distress.  Breath sounds clear to auscultation.    Heart: Normal rate.  Regular rhythm.    Abdomen: Abdomen is soft and distended.  (Obese  )Bowel sounds are normal.   There is no abdominal tenderness.  There is no suprapubic area tenderness.     Extremities: (Trace edema BLE)  Pulses: Distal pulses are intact.  There are no decreased pulses.    Neurological: Patient is alert and oriented to person, place and time.  GCS score is 15.    Skin:  Warm and dry.  No ecchymosis or cyanosis.             Results Review:    Lab Results (last 24 hours)     Procedure Component Value Units Date/Time    Blood Culture [[id number]]  (Normal) Collected:  04/05/17 1712    Specimen:  Blood from Arm, Left Updated:  04/09/17 1801     Blood Culture No growth at 4 days    Blood Culture [[id number]]  (Normal) Collected:  04/05/17 1702    Specimen:  Blood from Arm, Right Updated:  04/09/17 1801     Blood Culture No growth at 4 days "    POC Glucose Fingerstick [[id number]]  (Abnormal) Collected:  04/09/17 1810    Specimen:  Blood Updated:  04/09/17 1827     Glucose 226 (H) mg/dL       Sliding Scale AdminMeter: [id number]: [id number] CANELA AMADO       POC Glucose Fingerstick [[id number]]  (Abnormal) Collected:  04/09/17 0945    Specimen:  Blood Updated:  04/09/17 1915     Glucose 221 (H) mg/dL       Sliding Scale AdminMeter: [id number]: [id number] CANELA AMADO       POC Glucose Fingerstick [[id number]]  (Abnormal) Collected:  04/09/17 2137    Specimen:  Blood Updated:  04/09/17 2300     Glucose 241 (H) mg/dL       Sliding Scale AdminMeter: [id number]: [id number] JOEY STEARNS       CBC (No Diff) [[id number]]  (Abnormal) Collected:  04/10/17 0155    Specimen:  Blood Updated:  04/10/17 0206     WBC 14.76 (H) 10*3/mm3      RBC 3.11 (L) 10*6/mm3      Hemoglobin 7.2 (L) g/dL      Hematocrit 23.5 (L) %      MCV 75.6 (L) fL      MCH 23.2 (L) pg      MCHC 30.6 (L) g/dL      RDW 15.2 (H) %      RDW-SD 42.1 fl      MPV 8.6 fL      Platelets 199 10*3/mm3     Phosphorus [[id number]]  (Abnormal) Collected:  04/10/17 0155    Specimen:  Blood Updated:  04/10/17 0214     Phosphorus 5.6 (H) mg/dL     Comprehensive Metabolic Panel [[id number]]  (Abnormal) Collected:  04/10/17 0155    Specimen:  Blood Updated:  04/10/17 0221     Glucose 209 (H) mg/dL      BUN 62 (H) mg/dL      Creatinine 3.89 (H) mg/dL      Sodium 143 mmol/L      Potassium 4.0 mmol/L      Chloride 109 mmol/L      CO2 19.0 (L) mmol/L      Calcium 8.0 (L) mg/dL      Total Protein 5.7 (L) g/dL      Albumin 2.80 (L) g/dL      ALT (SGPT) 17 (L) U/L      AST (SGOT) 36 U/L      Alkaline Phosphatase 77 U/L      Total Bilirubin 0.2 mg/dL      eGFR Non African Amer 16 (L) mL/min/1.73      Globulin 2.9 gm/dL      A/G Ratio 1.0 (L) g/dL      BUN/Creatinine Ratio 15.9     Anion Gap 15.0 mmol/L     O2 Sat Run-Pulmonary Artery [[id number]]  (Abnormal) Collected:  04/10/17 1042    Specimen:   Blood Updated:  04/10/17 1044     Oxygen Saturation, Pulmonary Artery 65.3 (L) %     POC Glucose Fingerstick [[id number]]  (Abnormal) Collected:  04/10/17 0806    Specimen:  Blood Updated:  04/10/17 1446     Glucose 170 (H) mg/dL       Meter: [id number]: [id number] Pembe Panjur       POC Glucose Fingerstick [[id number]]  (Abnormal) Collected:  04/10/17 1118    Specimen:  Blood Updated:  04/10/17 1447     Glucose 161 (H) mg/dL       Meter: [id number]: [id number] Pembe Panjur            Imaging Results (last 24 hours)     Procedure Component Value Units Date/Time    XR Chest 1 View [[id number]] Collected:  04/10/17 0635     Updated:  04/10/17 0637    Narrative:       Exam: AP portable chest    INDICATION: Respiratory failure    COMPARISON: 4/8/2017    FINDINGS: AP portable chest. Heart is enlarged. The pulmonary  vascularity is prominent. No change in bilateral patchy airspace  opacity. Cannot exclude small effusions.      Impression:       No significant interval change.    Electronically signed by:  Vicente Nascimento MD  4/10/2017 6:36 AM  CDT Workstation: [id number]           I reviewed the patient's new clinical results.  I reviewed the patient's new imaging results and agree with the interpretation.  I reviewed the patient's other test results and agree with the interpretation      Assessment/Plan     Principal Problem:    NSTEMI (non-ST elevated myocardial infarction)  Active Problems:    Type 2 diabetes mellitus    Osteoarthritis of multiple joints    Essential hypertension    Bacterial pneumonia    Morbid obesity with BMI of 50.0-59.9, adult    Acute cystitis    Heart failure with preserved ejection fraction, borderline, class III    Pulmonary hypertension    Anemia      Assessment:  (Postop cyso, bilateral retrograde, right ureteroscopy and lithotripsy of stone with stent placement, bladder stone lithotripsy on 3/29/17 with Dr. Lopez. ).     Plan:   (No plans for  intervention until medically  stable. ).       Erick Moreno, APRN  04/10/17  5:07 PM

## 2017-04-10 NOTE — CONSULTS
"Adult Nutrition  Assessment    Patient Name:  Blake Chavez  YOB: 1955  MRN: [id number]  Admit Date:  4/5/2017    Assessment Date:  4/10/2017          Reason for Assessment       04/10/17 1833    Reason for Assessment    Reason For Assessment/Visit follow up protocol                Anthropometrics       04/10/17 1834    Anthropometrics    Height 184.5 cm (72.64\")    Weight (!)  179 kg (394 lb 10 oz)    Ideal Body Weight (IBW)    Ideal Body Weight (IBW), Male (kg) 83.85    % Ideal Body Weight 213.92    Body Mass Index (BMI)    BMI (kg/m2) 52.69    BMI Grade greater than 40 - obesity grade III            Labs/Tests/Procedures/Meds       04/10/17 1835    Labs/Tests/Procedures/Meds    Labs/Tests Review Reviewed    Medication Review Insulin;Reviewed, pertinent;Iron            Physical Findings       04/10/17 1835    Physical Findings/Assessment    Additional Documentation Physical Appearance (Group)    Physical Appearance    Skin other (see comments)   bilateral gluteal ulceration - venous              Nutrition Prescription Ordered       04/10/17 1836    Nutrition Prescription PO    Current PO Diet Regular    Common Modifiers Consistent Carbohydrate;Renal;Cardiac;Low Sodium              Comments:  Pt had a heart cath this date.  Pt reports fair appetite.  Meds and labs reviewed.  Family present.  RDN answered questions regarding Low Sodium diet.        Electronically signed by:  Christina Morrell RD  04/10/17 6:39 PM  "

## 2017-04-10 NOTE — PROGRESS NOTES
[address] [address]  T - [id number]         PROGRESS NOTE         SUBJECTIVE:   Patient Care Team:  Himanshu Tovar MD as PCP - General (Family Medicine)    Chief Complaint:   No chief complaint on file.  CC:weakness    Subjective     Patient is 61 y.o. male presents with weakness. He has undergone a right sided heart cath. He is doing well..      ROS/HISTORY/ CURRENT MEDICATIONS/OBJECTIVE/VS/PE:   Review of Systems:   Review of Systems   Constitutional: Positive for fatigue. Negative for fever.   Respiratory: Positive for shortness of breath. Negative for chest tightness.    Cardiovascular: Negative for chest pain.       History:     Past Medical History:   Diagnosis Date   • Acute cystitis    • BPH (benign prostatic hypertrophy)    • Calculus of kidney and ureter     recently passed      • Chest pain    • Crohn's disease    • Edema     unspecified - bilateral lower extremities     • Essential hypertension    • Hip pain    • Hypertensive disorder    • Knee pain    • Morbid obesity    • Nausea    • Nuclear senile cataract    • Osteoarthritis of multiple joints    • Paraumbilical hernia    • Right hand fracture     as a teen   • Severe concentric left ventricular hypertrophy    • Type 2 diabetes mellitus      no BDR    • Type 2 diabetes mellitus      no BDR      Past Surgical History:   Procedure Laterality Date   • COLONOSCOPY  ; 11/03/0216    Diverticulosis in sigmoid colon. 1 polyp in sigmoid colon; removed by cold biopsy polypectomy. Hemorrhoids found   • CYSTOSCOPY, URETEROSCOPY, RETROGRADE PYELOGRAM, STENT INSERTION Bilateral 3/29/2017    Procedure: CYSTOSCOPY, BILATERAL RETROGRADE, URETEROSCOPY, LASER LITHOTRIPSY, STENT PLACEMENT; LASER LITHOTRIPSY BLADDER CALCULI;  Surgeon: Blake Lopez MD;  Location: [facility];  Service:    • HYDROCELE EXCISION / REPAIR     • INJECTION OF MEDICATION  04/18/2016    Kenalog   • UPPER GASTROINTESTINAL ENDOSCOPY   11/03/2016     Family History   Problem Relation Age of Onset   • Cancer Other    • Diabetes Other    • Stroke Other    • Other Other      Colon problems    • Diabetes Mother    • Heart failure Mother    • Colon cancer Father    • Breast cancer Paternal Grandmother      Social History   Substance Use Topics   • Smoking status: Never Smoker   • Smokeless tobacco: Never Used   • Alcohol use No     Prescriptions Prior to Admission   Medication Sig Dispense Refill Last Dose   • diltiaZEM (TIAZAC) 360 MG 24 hr capsule TAKE 1 CAPSULE EVERY DAY 30 capsule 5 3/29/2017 at 1000   • FE BISGLY-FE [id number] PO Take  by mouth Daily.   3/28/2017 at 2200   • gabapentin (NEURONTIN) 100 MG capsule TAKE 1 CAPSULE AT BEDTIME FOR FOOT PAIN 30 capsule 5 3/28/2017 at 2200   • Glucosamine 500 MG capsule Take 500 mg by mouth 2 (Two) Times a Day.   4/5/2017 at Unknown time   • glyBURIDE (DIAbeta) 5 MG tablet TAKE 2 TABLETS TWICE DAILY BEFORE MEALS (Patient taking differently: TAKE 2 TABLETS BID) 120 tablet 5 3/28/2017 at 2200   • hydrALAZINE (APRESOLINE) 25 MG tablet Take 1 tablet by mouth 3 (Three) Times a Day. (Patient taking differently: Take 25 mg by mouth 2 (Two) Times a Day.) 90 tablet 3 3/29/2017 at 1000   • lisinopril (PRINIVIL,ZESTRIL) 20 MG tablet TAKE 1 TABLET BY MOUTH EVERY DAY FOR BLOOD PRESSURE 30 tablet 5 3/28/2017 at 1000   • nateglinide (STARLIX) 120 MG tablet TAKE 1 TABLET BY MOUTH BEFORE MEALS (Patient taking differently: daily) 90 tablet 3 3/28/2017 at 1000   • Omega-3 Fatty Acids (FISH OIL) 1000 MG capsule capsule Take 1,000 mg by mouth Daily With Breakfast.   3/28/2017 at 2200   • ONE TOUCH ULTRA TEST test strip USE THREE TIMES DAILY 100 each 11 Taking   • POLY-IRON 150 150 MG capsule TAKE 1 CAPSULE TWICE DAILY 60 capsule 3 3/28/2017 at 2200   • pravastatin (PRAVACHOL) 40 MG tablet Take 1 tablet by mouth Daily. 31 tablet 6 3/28/2017 at 1000   • promethazine (PHENERGAN) 12.5 MG tablet Take 1 tablet by mouth Every  6 (Six) Hours As Needed (prn). 30 tablet 1 More than a month at Unknown time   • SITagliptin (JANUVIA) 100 MG tablet Take 1 tablet by mouth Daily. (Patient taking differently: Take 50 mg by mouth Daily.) 30 tablet 5 3/28/2017 at 1000   • VITAMIN D, ERGOCALCIFEROL, PO Take  by mouth Daily.   3/28/2017 at 1000   • amoxicillin-clavulanate (AUGMENTIN) 875-125 MG per tablet Take 1 tablet by mouth 2 (Two) Times a Day for 10 days. 20 tablet 0    • [] doxycycline (VIBRAMYCIN) 100 MG capsule Take 1 capsule by mouth Daily for 7 days. 7 capsule 0    • HYDROcodone-acetaminophen (NORCO)  MG per tablet Take 1 tablet by mouth Every 6 (Six) Hours As Needed for Moderate Pain (4-6).      • omeprazole (priLOSEC) 40 MG capsule Take 40 mg by mouth Daily.   3/29/2017 at 1000   • oxyCODONE-acetaminophen (PERCOCET) 7.5-325 MG per tablet Take 1-2 tablets by mouth Every 4 (Four) Hours As Needed (Pain). 15 tablet 0    • pantoprazole (PROTONIX) 40 MG EC tablet Take 40 mg by mouth Daily.   not started     Allergies:  Review of patient's allergies indicates no known allergies.    Current Medications:     Current Facility-Administered Medications   Medication Dose Route Frequency Provider Last Rate Last Dose   • atorvastatin (LIPITOR) tablet 80 mg  80 mg Oral Nightly Bharath Dobbins MD PhD   80 mg at 17 2132   • bumetanide (BUMEX) injection 2 mg  2 mg Intravenous Q12H Alanna Brand MD   2 mg at 04/10/17 1228   • clopidogrel (PLAVIX) tablet 75 mg  75 mg Oral Daily Bharath Dobbins MD PhD   75 mg at 04/10/17 0815   • dextrose (D50W) solution 25 g  25 g Intravenous Q15 Min PRN Hayley Valderrama MD       • dextrose (GLUTOSE) oral gel 15 g  15 g Oral Q15 Min PRN Hayley Valderrama MD       • ferrous sulfate EC tablet 324 mg  324 mg Oral BID With Meals Bharath Dobbins MD PhD   324 mg at 04/10/17 0815   • gabapentin (NEURONTIN) capsule 100 mg  100 mg Oral Nightly Hayley Valderrama MD   100 mg at 17 2136   • glucagon  (human recombinant) (GLUCAGEN DIAGNOSTIC) injection 1 mg  1 mg Subcutaneous Q15 Min PRN Hayley Valderrama MD       • heparin (porcine) 5000 UNIT/ML injection 5,000 Units  5,000 Units Subcutaneous Q8H Bharath Dobbins MD PhD   5,000 Units at 04/10/17 0550   • hydrALAZINE (APRESOLINE) tablet 25 mg  25 mg Oral BID Hayley Valderrama MD   25 mg at 04/10/17 0817   • HYDROcodone-acetaminophen (NORCO)  MG per tablet 1 tablet  1 tablet Oral Q6H PRN Hayley Valderrama MD   1 tablet at 04/10/17 0815   • insulin aspart (novoLOG) injection 0-7 Units  0-7 Units Subcutaneous 4x Daily AC & at Bedtime Hayley Valderrama MD   2 Units at 04/10/17 1123   • ipratropium-albuterol (DUO-NEB) nebulizer solution 3 mL  3 mL Nebulization 4x Daily - RT Abby Fu MD   3 mL at 04/10/17 1254   • Linezolid (ZYVOX) 600 mg 300 mL  600 mg Intravenous Q12H Javed Childers  mL/hr at 04/10/17 1116 600 mg at 04/10/17 1116   • magic butt ointment   Topical BID Hayley Valderrama MD       • metoprolol tartrate (LOPRESSOR) tablet 25 mg  25 mg Oral Q12H Bharath Dobbins MD PhD   25 mg at 04/10/17 0815   • pantoprazole (PROTONIX) EC tablet 40 mg  40 mg Oral Daily Hayley Valderrama MD   40 mg at 04/10/17 0815   • piperacillin-tazobactam (ZOSYN) in iso-osmotic dextrose IVPB 2.25 g (premix)  2.25 g Intravenous Q6H Alanna Brand MD 0 mL/hr at 04/10/17 0652 2.25 g at 04/10/17 1232   • pneumococcal polysaccharide 23-valent (PNEUMOVAX-23) vaccine 0.5 mL  0.5 mL Intramuscular During Hospitalization Javed Childers DO       • sodium chloride (OCEAN) nasal spray 1 spray  1 spray Each Nare PRN Abby Fu MD       • sodium chloride 0.9 % flush 1-10 mL  1-10 mL Intravenous PRN Hayley Valderrama MD   10 mL at 04/10/17 0824   • sodium chloride 0.9 % flush 1-10 mL  1-10 mL Intravenous PRN Bharath Dobbins MD PhD       • sodium chloride 0.9 % infusion  - ADS Override Pull                Objective     Physical Exam:   Temp:  [98.5 °F (36.9 °C)-99.3 °F  (37.4 °C)] 99 °F (37.2 °C)  Heart Rate:  [] 87  Resp:  [16-23] 19  BP: ()/(52-82) 102/52    Physical Exam   Constitutional: He appears well-developed and well-nourished.   HENT:   Head: Normocephalic and atraumatic.   Cardiovascular: Normal rate, regular rhythm and normal heart sounds.  Exam reveals no gallop and no friction rub.    No murmur heard.  Pulmonary/Chest: Effort normal and breath sounds normal. No respiratory distress. He has no wheezes. He has no rales.   Abdominal: Soft. Bowel sounds are normal.   Skin: Skin is warm and dry.   Vitals reviewed.           Results Review:   Lab Results   Component Value Date    GLUCOSE 209 (H) 04/10/2017    BUN 62 (H) 04/10/2017    CREATININE 3.89 (H) 04/10/2017    EGFRIFNONA 16 (L) 04/10/2017    BCR 15.9 04/10/2017    CO2 19.0 (L) 04/10/2017    CALCIUM 8.0 (L) 04/10/2017    ALBUMIN 2.80 (L) 04/10/2017    LABIL2 1.0 (L) 04/10/2017    AST 36 04/10/2017    ALT 17 (L) 04/10/2017         WBC WBC   Date Value Ref Range Status   04/10/2017 14.76 (H) 3.20 - 9.80 10*3/mm3 Final   04/09/2017 15.39 (H) 3.20 - 9.80 10*3/mm3 Final   04/08/2017 11.96 (H) 3.20 - 9.80 10*3/mm3 Final      HGB Hemoglobin   Date Value Ref Range Status   04/10/2017 7.2 (L) 13.7 - 17.3 g/dL Final   04/09/2017 7.7 (L) 13.7 - 17.3 g/dL Final   04/08/2017 7.2 (L) 13.7 - 17.3 g/dL Final      HCT Hematocrit   Date Value Ref Range Status   04/10/2017 23.5 (L) 39.0 - 49.0 % Final   04/09/2017 24.6 (L) 39.0 - 49.0 % Final   04/08/2017 23.2 (L) 39.0 - 49.0 % Final      Platlets Platelets   Date Value Ref Range Status   04/10/2017 199 150 - 450 10*3/mm3 Final   04/09/2017 236 150 - 450 10*3/mm3 Final   04/08/2017 213 150 - 450 10*3/mm3 Final          Imaging Results (last 24 hours)     Procedure Component Value Units Date/Time    XR Chest 1 View [[id number]] Collected:  04/10/17 0635     Updated:  04/10/17 0637    Narrative:       Exam: AP portable chest    INDICATION: Respiratory failure    COMPARISON:  4/8/2017    FINDINGS: AP portable chest. Heart is enlarged. The pulmonary  vascularity is prominent. No change in bilateral patchy airspace  opacity. Cannot exclude small effusions.      Impression:       No significant interval change.    Electronically signed by:  Vicente Nascimento MD  4/10/2017 6:36 AM  CDT Workstation: [id number]           I reviewed the patient's new clinical results.  I reviewed the patient's new imaging results and agree with the interpretation.     ASSESSMENT/PLAN:   Assessment/Plan   Principal Problem:    NSTEMI (non-ST elevated myocardial infarction)  Active Problems:    Type 2 diabetes mellitus    Osteoarthritis of multiple joints    Essential hypertension    Bacterial pneumonia    Morbid obesity with BMI of 50.0-59.9, adult    Acute cystitis    Heart failure with preserved ejection fraction, borderline, class III    Pulmonary hypertension    Anemia      S/p right heart cath.  Patient is being transfused.Will monitor H/H.  Will proceed as per Cardiology.  I discussed the patients findings and my recommendations with patient.      Himanshu Tovar MD  04/10/17  1:51 PM

## 2017-04-10 NOTE — PLAN OF CARE
Problem: Patient Care Overview (Adult)  Goal: Plan of Care Review  Outcome: Ongoing (interventions implemented as appropriate)    04/09/17 0620 04/10/17 0525   Coping/Psychosocial Response Interventions   Plan Of Care Reviewed With --  patient   Patient Care Overview   Progress --  no change   Outcome Evaluation   Outcome Summary/Follow up Plan Patient is tolerating being on the nasal cannula for longer periods of time. Patient states that he is feeling better.  --        Goal: Adult Individualization and Mutuality  Outcome: Ongoing (interventions implemented as appropriate)  Goal: Discharge Needs Assessment  Outcome: Ongoing (interventions implemented as appropriate)    Problem: Fall Risk (Adult)  Goal: Identify Related Risk Factors and Signs and Symptoms  Outcome: Ongoing (interventions implemented as appropriate)  Goal: Absence of Falls  Outcome: Ongoing (interventions implemented as appropriate)    Problem: Respiratory Insufficiency (Adult)  Goal: Identify Related Risk Factors and Signs and Symptoms  Outcome: Ongoing (interventions implemented as appropriate)  Goal: Acid/Base Balance  Outcome: Ongoing (interventions implemented as appropriate)  Goal: Effective Ventilation  Outcome: Ongoing (interventions implemented as appropriate)

## 2017-04-11 ENCOUNTER — APPOINTMENT (OUTPATIENT)
Dept: ULTRASOUND IMAGING | Facility: HOSPITAL | Age: 62
End: 2017-04-11

## 2017-04-11 ENCOUNTER — APPOINTMENT (OUTPATIENT)
Dept: INTERVENTIONAL RADIOLOGY/VASCULAR | Facility: HOSPITAL | Age: 62
End: 2017-04-11

## 2017-04-11 LAB
ALBUMIN SERPL-MCNC: 2.9 G/DL (ref 3.4–4.8)
ANION GAP SERPL CALCULATED.3IONS-SCNC: 17 MMOL/L (ref 5–15)
BUN BLD-MCNC: 66 MG/DL (ref 7–21)
BUN/CREAT SERPL: 13.7 (ref 7–25)
CALCIUM SPEC-SCNC: 8.3 MG/DL (ref 8.4–10.2)
CHLORIDE SERPL-SCNC: 110 MMOL/L (ref 95–110)
CO2 SERPL-SCNC: 17 MMOL/L (ref 22–31)
CREAT BLD-MCNC: 4.81 MG/DL (ref 0.7–1.3)
DEPRECATED RDW RBC AUTO: 43.8 FL (ref 35.1–43.9)
ERYTHROCYTE [DISTWIDTH] IN BLOOD BY AUTOMATED COUNT: 15.5 % (ref 11.5–14.5)
GFR SERPL CREATININE-BSD FRML MDRD: 12 ML/MIN/1.73 (ref 49–113)
GLUCOSE BLD-MCNC: 172 MG/DL (ref 60–100)
GLUCOSE BLDC GLUCOMTR-MCNC: 119 MG/DL (ref 70–130)
GLUCOSE BLDC GLUCOMTR-MCNC: 125 MG/DL (ref 70–130)
GLUCOSE BLDC GLUCOMTR-MCNC: 167 MG/DL (ref 70–130)
GLUCOSE BLDC GLUCOMTR-MCNC: 205 MG/DL (ref 70–130)
HBV SURFACE AB SER QL: 10.2 (ref 0–4.99)
HBV SURFACE AB SER RIA-ACNC: ABNORMAL
HBV SURFACE AG SERPL QL IA: NEGATIVE
HCT VFR BLD AUTO: 24.8 % (ref 39–49)
HGB BLD-MCNC: 7.8 G/DL (ref 13.7–17.3)
MCH RBC QN AUTO: 24 PG (ref 26.5–34)
MCHC RBC AUTO-ENTMCNC: 31.5 G/DL (ref 31.5–36.3)
MCV RBC AUTO: 76.3 FL (ref 80–98)
PHOSPHATE SERPL-MCNC: 6.8 MG/DL (ref 2.4–4.4)
PLATELET # BLD AUTO: 222 10*3/MM3 (ref 150–450)
PMV BLD AUTO: 9 FL (ref 8–12)
POTASSIUM BLD-SCNC: 3.9 MMOL/L (ref 3.5–5.1)
RBC # BLD AUTO: 3.25 10*6/MM3 (ref 4.37–5.74)
SODIUM BLD-SCNC: 144 MMOL/L (ref 137–145)
WBC NRBC COR # BLD: 14.36 10*3/MM3 (ref 3.2–9.8)

## 2017-04-11 PROCEDURE — 80069 RENAL FUNCTION PANEL: CPT | Performed by: FAMILY MEDICINE

## 2017-04-11 PROCEDURE — 83540 ASSAY OF IRON: CPT | Performed by: INTERNAL MEDICINE

## 2017-04-11 PROCEDURE — 94660 CPAP INITIATION&MGMT: CPT

## 2017-04-11 PROCEDURE — 36430 TRANSFUSION BLD/BLD COMPNT: CPT

## 2017-04-11 PROCEDURE — 82728 ASSAY OF FERRITIN: CPT | Performed by: INTERNAL MEDICINE

## 2017-04-11 PROCEDURE — 36556 INSERT NON-TUNNEL CV CATH: CPT | Performed by: THORACIC SURGERY (CARDIOTHORACIC VASCULAR SURGERY)

## 2017-04-11 PROCEDURE — 25010000002 PIPERACILLIN SOD-TAZOBACTAM PER 1 G: Performed by: INTERNAL MEDICINE

## 2017-04-11 PROCEDURE — 94799 UNLISTED PULMONARY SVC/PX: CPT

## 2017-04-11 PROCEDURE — 86900 BLOOD TYPING SEROLOGIC ABO: CPT

## 2017-04-11 PROCEDURE — 5A1D60Z PERFORMANCE OF URINARY FILTRATION, MULTIPLE: ICD-10-PCS | Performed by: FAMILY MEDICINE

## 2017-04-11 PROCEDURE — P9016 RBC LEUKOCYTES REDUCED: HCPCS

## 2017-04-11 PROCEDURE — C1752 CATH,HEMODIALYSIS,SHORT-TERM: HCPCS

## 2017-04-11 PROCEDURE — 99232 SBSQ HOSP IP/OBS MODERATE 35: CPT | Performed by: INTERNAL MEDICINE

## 2017-04-11 PROCEDURE — 25010000002 MORPHINE SULFATE (PF) 2 MG/ML SOLUTION: Performed by: INTERNAL MEDICINE

## 2017-04-11 PROCEDURE — 63710000001 INSULIN ASPART PER 5 UNITS: Performed by: FAMILY MEDICINE

## 2017-04-11 PROCEDURE — 85027 COMPLETE CBC AUTOMATED: CPT | Performed by: FAMILY MEDICINE

## 2017-04-11 PROCEDURE — 25010000002 LINEZOLID 600 MG/300ML SOLUTION: Performed by: FAMILY MEDICINE

## 2017-04-11 PROCEDURE — 83550 IRON BINDING TEST: CPT | Performed by: INTERNAL MEDICINE

## 2017-04-11 PROCEDURE — 76937 US GUIDE VASCULAR ACCESS: CPT

## 2017-04-11 PROCEDURE — 99232 SBSQ HOSP IP/OBS MODERATE 35: CPT | Performed by: FAMILY MEDICINE

## 2017-04-11 PROCEDURE — 86706 HEP B SURFACE ANTIBODY: CPT | Performed by: INTERNAL MEDICINE

## 2017-04-11 PROCEDURE — 25010000002 HEPARIN (PORCINE) PER 1000 UNITS: Performed by: INTERNAL MEDICINE

## 2017-04-11 PROCEDURE — 87340 HEPATITIS B SURFACE AG IA: CPT | Performed by: INTERNAL MEDICINE

## 2017-04-11 PROCEDURE — 06HM33Z INSERTION OF INFUSION DEVICE INTO RIGHT FEMORAL VEIN, PERCUTANEOUS APPROACH: ICD-10-PCS | Performed by: THORACIC SURGERY (CARDIOTHORACIC VASCULAR SURGERY)

## 2017-04-11 PROCEDURE — 82962 GLUCOSE BLOOD TEST: CPT

## 2017-04-11 RX ORDER — HEPARIN SODIUM 1000 [USP'U]/ML
2000 INJECTION, SOLUTION INTRAVENOUS; SUBCUTANEOUS AS NEEDED
Status: DISCONTINUED | OUTPATIENT
Start: 2017-04-12 | End: 2017-04-14 | Stop reason: SDUPTHER

## 2017-04-11 RX ORDER — MORPHINE SULFATE 2 MG/ML
2 INJECTION, SOLUTION INTRAMUSCULAR; INTRAVENOUS ONCE
Status: COMPLETED | OUTPATIENT
Start: 2017-04-11 | End: 2017-04-11

## 2017-04-11 RX ORDER — ALBUMIN (HUMAN) 12.5 G/50ML
12.5 SOLUTION INTRAVENOUS AS NEEDED
Status: DISPENSED | OUTPATIENT
Start: 2017-04-12 | End: 2017-04-12

## 2017-04-11 RX ORDER — HEPARIN SODIUM 1000 [USP'U]/ML
2000 INJECTION, SOLUTION INTRAVENOUS; SUBCUTANEOUS AS NEEDED
Status: DISCONTINUED | OUTPATIENT
Start: 2017-04-12 | End: 2017-04-11

## 2017-04-11 RX ADMIN — TAZOBACTAM SODIUM AND PIPERACILLIN SODIUM 2.25 G: 250; 2 INJECTION, SOLUTION INTRAVENOUS at 19:26

## 2017-04-11 RX ADMIN — FERROUS SULFATE TAB EC 324 MG (65 MG FE EQUIVALENT) 324 MG: 324 (65 FE) TABLET DELAYED RESPONSE at 08:13

## 2017-04-11 RX ADMIN — IPRATROPIUM BROMIDE AND ALBUTEROL SULFATE 3 ML: 2.5; .5 SOLUTION RESPIRATORY (INHALATION) at 15:27

## 2017-04-11 RX ADMIN — HYDRALAZINE HYDROCHLORIDE 25 MG: 25 TABLET ORAL at 08:14

## 2017-04-11 RX ADMIN — Medication: at 18:30

## 2017-04-11 RX ADMIN — LINEZOLID 600 MG: 600 INJECTION, SOLUTION INTRAVENOUS at 20:30

## 2017-04-11 RX ADMIN — Medication: at 09:07

## 2017-04-11 RX ADMIN — CLOPIDOGREL BISULFATE 75 MG: 75 TABLET ORAL at 08:13

## 2017-04-11 RX ADMIN — TAZOBACTAM SODIUM AND PIPERACILLIN SODIUM 2.25 G: 250; 2 INJECTION, SOLUTION INTRAVENOUS at 06:24

## 2017-04-11 RX ADMIN — INSULIN ASPART 2 UNITS: 100 INJECTION, SOLUTION INTRAVENOUS; SUBCUTANEOUS at 08:16

## 2017-04-11 RX ADMIN — MORPHINE SULFATE 2 MG: 2 INJECTION, SOLUTION INTRAMUSCULAR; INTRAVENOUS at 16:38

## 2017-04-11 RX ADMIN — PANTOPRAZOLE SODIUM 40 MG: 40 TABLET, DELAYED RELEASE ORAL at 08:13

## 2017-04-11 RX ADMIN — TAZOBACTAM SODIUM AND PIPERACILLIN SODIUM 2.25 G: 250; 2 INJECTION, SOLUTION INTRAVENOUS at 11:32

## 2017-04-11 RX ADMIN — ATORVASTATIN CALCIUM 80 MG: 40 TABLET, FILM COATED ORAL at 20:33

## 2017-04-11 RX ADMIN — HEPARIN SODIUM 5000 UNITS: 5000 INJECTION, SOLUTION INTRAVENOUS; SUBCUTANEOUS at 22:02

## 2017-04-11 RX ADMIN — HYDROCODONE BITARTRATE AND ACETAMINOPHEN 1 TABLET: 10; 325 TABLET ORAL at 08:13

## 2017-04-11 RX ADMIN — HYDROCODONE BITARTRATE AND ACETAMINOPHEN 1 TABLET: 10; 325 TABLET ORAL at 20:31

## 2017-04-11 RX ADMIN — GABAPENTIN 100 MG: 100 CAPSULE ORAL at 20:31

## 2017-04-11 RX ADMIN — FERROUS SULFATE TAB EC 324 MG (65 MG FE EQUIVALENT) 324 MG: 324 (65 FE) TABLET DELAYED RESPONSE at 18:29

## 2017-04-11 RX ADMIN — LINEZOLID 600 MG: 600 INJECTION, SOLUTION INTRAVENOUS at 09:07

## 2017-04-11 RX ADMIN — IPRATROPIUM BROMIDE AND ALBUTEROL SULFATE 3 ML: 2.5; .5 SOLUTION RESPIRATORY (INHALATION) at 07:29

## 2017-04-11 RX ADMIN — IPRATROPIUM BROMIDE AND ALBUTEROL SULFATE 3 ML: 2.5; .5 SOLUTION RESPIRATORY (INHALATION) at 19:25

## 2017-04-11 RX ADMIN — INSULIN ASPART 4 UNITS: 100 INJECTION, SOLUTION INTRAVENOUS; SUBCUTANEOUS at 11:32

## 2017-04-11 RX ADMIN — HEPARIN SODIUM 5000 UNITS: 5000 INJECTION, SOLUTION INTRAVENOUS; SUBCUTANEOUS at 06:24

## 2017-04-11 RX ADMIN — METOPROLOL TARTRATE 25 MG: 25 TABLET ORAL at 08:13

## 2017-04-11 RX ADMIN — METOPROLOL TARTRATE 25 MG: 25 TABLET ORAL at 20:31

## 2017-04-11 RX ADMIN — IPRATROPIUM BROMIDE AND ALBUTEROL SULFATE 3 ML: 2.5; .5 SOLUTION RESPIRATORY (INHALATION) at 11:40

## 2017-04-11 NOTE — PROGRESS NOTES
[address] [address]  T - [id number]         PROGRESS NOTE         SUBJECTIVE:   Patient Care Team:  Himanshu Tovar MD as PCP - General (Family Medicine)    Chief Complaint:   No chief complaint on file.      Subjective    CC: dyspnea    Patient is 61 y.o. male presents with dyspnea and weakness. He received a transfusion yesterday. Creatin.      ROS/HISTORY/ CURRENT MEDICATIONS/OBJECTIVE/VS/PE:   Review of Systems:   Review of Systems    History:     Past Medical History:   Diagnosis Date   • Acute cystitis    • BPH (benign prostatic hypertrophy)    • Calculus of kidney and ureter     recently passed      • Chest pain    • Crohn's disease    • Edema     unspecified - bilateral lower extremities     • Essential hypertension    • Hip pain    • Hypertensive disorder    • Knee pain    • Morbid obesity    • Nausea    • Nuclear senile cataract    • Osteoarthritis of multiple joints    • Paraumbilical hernia    • Right hand fracture     as a teen   • Severe concentric left ventricular hypertrophy    • Type 2 diabetes mellitus      no BDR    • Type 2 diabetes mellitus      no BDR      Past Surgical History:   Procedure Laterality Date   • COLONOSCOPY  ; 11/03/0216    Diverticulosis in sigmoid colon. 1 polyp in sigmoid colon; removed by cold biopsy polypectomy. Hemorrhoids found   • CYSTOSCOPY, URETEROSCOPY, RETROGRADE PYELOGRAM, STENT INSERTION Bilateral 3/29/2017    Procedure: CYSTOSCOPY, BILATERAL RETROGRADE, URETEROSCOPY, LASER LITHOTRIPSY, STENT PLACEMENT; LASER LITHOTRIPSY BLADDER CALCULI;  Surgeon: Blake Lopez MD;  Location: [facility];  Service:    • HYDROCELE EXCISION / REPAIR     • INJECTION OF MEDICATION  04/18/2016    Kenalog   • UPPER GASTROINTESTINAL ENDOSCOPY  11/03/2016     Family History   Problem Relation Age of Onset   • Cancer Other    • Diabetes Other    • Stroke Other    • Other Other      Colon problems    • Diabetes Mother    • Heart failure  Mother    • Colon cancer Father    • Breast cancer Paternal Grandmother      Social History   Substance Use Topics   • Smoking status: Never Smoker   • Smokeless tobacco: Never Used   • Alcohol use No     Prescriptions Prior to Admission   Medication Sig Dispense Refill Last Dose   • diltiaZEM (TIAZAC) 360 MG 24 hr capsule TAKE 1 CAPSULE EVERY DAY 30 capsule 5 3/29/2017 at 1000   • FE BISGLY-FE [id number] PO Take  by mouth Daily.   3/28/2017 at 2200   • gabapentin (NEURONTIN) 100 MG capsule TAKE 1 CAPSULE AT BEDTIME FOR FOOT PAIN 30 capsule 5 3/28/2017 at 2200   • Glucosamine 500 MG capsule Take 500 mg by mouth 2 (Two) Times a Day.   4/5/2017 at Unknown time   • glyBURIDE (DIAbeta) 5 MG tablet TAKE 2 TABLETS TWICE DAILY BEFORE MEALS (Patient taking differently: TAKE 2 TABLETS BID) 120 tablet 5 3/28/2017 at 2200   • hydrALAZINE (APRESOLINE) 25 MG tablet Take 1 tablet by mouth 3 (Three) Times a Day. (Patient taking differently: Take 25 mg by mouth 2 (Two) Times a Day.) 90 tablet 3 3/29/2017 at 1000   • lisinopril (PRINIVIL,ZESTRIL) 20 MG tablet TAKE 1 TABLET BY MOUTH EVERY DAY FOR BLOOD PRESSURE 30 tablet 5 3/28/2017 at 1000   • nateglinide (STARLIX) 120 MG tablet TAKE 1 TABLET BY MOUTH BEFORE MEALS (Patient taking differently: daily) 90 tablet 3 3/28/2017 at 1000   • Omega-3 Fatty Acids (FISH OIL) 1000 MG capsule capsule Take 1,000 mg by mouth Daily With Breakfast.   3/28/2017 at 2200   • ONE TOUCH ULTRA TEST test strip USE THREE TIMES DAILY 100 each 11 Taking   • POLY-IRON 150 150 MG capsule TAKE 1 CAPSULE TWICE DAILY 60 capsule 3 3/28/2017 at 2200   • pravastatin (PRAVACHOL) 40 MG tablet Take 1 tablet by mouth Daily. 31 tablet 6 3/28/2017 at 1000   • promethazine (PHENERGAN) 12.5 MG tablet Take 1 tablet by mouth Every 6 (Six) Hours As Needed (prn). 30 tablet 1 More than a month at Unknown time   • SITagliptin (JANUVIA) 100 MG tablet Take 1 tablet by mouth Daily. (Patient taking differently: Take 50 mg by  mouth Daily.) 30 tablet 5 3/28/2017 at 1000   • VITAMIN D, ERGOCALCIFEROL, PO Take  by mouth Daily.   3/28/2017 at 1000   • amoxicillin-clavulanate (AUGMENTIN) 875-125 MG per tablet Take 1 tablet by mouth 2 (Two) Times a Day for 10 days. 20 tablet 0    • [] doxycycline (VIBRAMYCIN) 100 MG capsule Take 1 capsule by mouth Daily for 7 days. 7 capsule 0    • HYDROcodone-acetaminophen (NORCO)  MG per tablet Take 1 tablet by mouth Every 6 (Six) Hours As Needed for Moderate Pain (4-6).      • omeprazole (priLOSEC) 40 MG capsule Take 40 mg by mouth Daily.   3/29/2017 at 1000   • oxyCODONE-acetaminophen (PERCOCET) 7.5-325 MG per tablet Take 1-2 tablets by mouth Every 4 (Four) Hours As Needed (Pain). 15 tablet 0    • pantoprazole (PROTONIX) 40 MG EC tablet Take 40 mg by mouth Daily.   not started     Allergies:  Review of patient's allergies indicates no known allergies.    Current Medications:     Current Facility-Administered Medications   Medication Dose Route Frequency Provider Last Rate Last Dose   • atorvastatin (LIPITOR) tablet 80 mg  80 mg Oral Nightly Bharath Dobbins MD PhD   80 mg at 04/10/17 2116   • bumetanide (BUMEX) injection 2 mg  2 mg Intravenous Q12H Alanna Brand MD   2 mg at 04/10/17 2826   • clopidogrel (PLAVIX) tablet 75 mg  75 mg Oral Daily Bharath Dobbins MD PhD   75 mg at 17   • dextrose (D50W) solution 25 g  25 g Intravenous Q15 Min PRN Hayley Valderrama MD       • dextrose (GLUTOSE) oral gel 15 g  15 g Oral Q15 Min PRN Hayley Valderrama MD       • ferrous sulfate EC tablet 324 mg  324 mg Oral BID With Meals Bharath Dobbins MD PhD   324 mg at 17   • gabapentin (NEURONTIN) capsule 100 mg  100 mg Oral Nightly Hayley Valderrama MD   100 mg at 04/10/17 2117   • glucagon (human recombinant) (GLUCAGEN DIAGNOSTIC) injection 1 mg  1 mg Subcutaneous Q15 Min PRN Hayley Valderrama MD       • heparin (porcine) 5000 UNIT/ML injection 5,000 Units  5,000 Units Subcutaneous  Q8H Bharath Dobbins MD PhD   5,000 Units at 04/11/17 0624   • hydrALAZINE (APRESOLINE) tablet 25 mg  25 mg Oral BID Hayley Valderrama MD   25 mg at 04/11/17 0814   • HYDROcodone-acetaminophen (NORCO)  MG per tablet 1 tablet  1 tablet Oral Q6H PRN Hayley Valderrama MD   1 tablet at 04/11/17 0813   • insulin aspart (novoLOG) injection 0-7 Units  0-7 Units Subcutaneous 4x Daily AC & at Bedtime Hayley Valderrama MD   2 Units at 04/11/17 0816   • ipratropium-albuterol (DUO-NEB) nebulizer solution 3 mL  3 mL Nebulization 4x Daily - RT Abby Fu MD   3 mL at 04/11/17 0729   • Linezolid (ZYVOX) 600 mg 300 mL  600 mg Intravenous Q12H Javed Childers  mL/hr at 04/10/17 2122 600 mg at 04/10/17 2122   • magic butt ointment   Topical BID Halyey Valderrama MD       • metoprolol tartrate (LOPRESSOR) tablet 25 mg  25 mg Oral Q12H Bharath Dobbins MD PhD   25 mg at 04/11/17 0813   • pantoprazole (PROTONIX) EC tablet 40 mg  40 mg Oral Daily Hayley Valderrama MD   40 mg at 04/11/17 0813   • piperacillin-tazobactam (ZOSYN) in iso-osmotic dextrose IVPB 2.25 g (premix)  2.25 g Intravenous Q6H Alanna Brand MD 0 mL/hr at 04/10/17 0652 2.25 g at 04/11/17 0624   • pneumococcal polysaccharide 23-valent (PNEUMOVAX-23) vaccine 0.5 mL  0.5 mL Intramuscular During Hospitalization Javed Childers DO       • sodium chloride (OCEAN) nasal spray 1 spray  1 spray Each Nare PRN Abby Fu MD       • sodium chloride 0.9 % flush 1-10 mL  1-10 mL Intravenous PRN Hayley Valderrama MD   10 mL at 04/10/17 0824   • sodium chloride 0.9 % flush 1-10 mL  1-10 mL Intravenous PRN Bharath Dobbins MD PhD           Objective     Physical Exam:   Temp:  [98.4 °F (36.9 °C)-99.4 °F (37.4 °C)] 98.9 °F (37.2 °C)  Heart Rate:  [78-96] 84  Resp:  [15-26] 18  BP: ()/(50-77) 120/77    Physical Exam   Constitutional: He appears well-developed and well-nourished.   HENT:   Head: Normocephalic and atraumatic.   Cardiovascular: Normal  rate, regular rhythm and normal heart sounds.  Exam reveals no gallop and no friction rub.    No murmur heard.  Pulmonary/Chest:   Decreased at the base.   Abdominal: Soft. Bowel sounds are normal. He exhibits no distension. There is no tenderness.   Skin: Skin is warm.   Vitals reviewed.           Results Review:   Lab Results   Component Value Date    GLUCOSE 172 (H) 04/11/2017    BUN 66 (H) 04/11/2017    CREATININE 4.81 (H) 04/11/2017    EGFRIFNONA 12 (L) 04/11/2017    BCR 13.7 04/11/2017    CO2 17.0 (L) 04/11/2017    CALCIUM 8.3 (L) 04/11/2017    ALBUMIN 2.90 (L) 04/11/2017    LABIL2 1.0 (L) 04/10/2017    AST 36 04/10/2017    ALT 17 (L) 04/10/2017         WBC WBC   Date Value Ref Range Status   04/11/2017 14.36 (H) 3.20 - 9.80 10*3/mm3 Final   04/10/2017 14.76 (H) 3.20 - 9.80 10*3/mm3 Final   04/09/2017 15.39 (H) 3.20 - 9.80 10*3/mm3 Final      HGB Hemoglobin   Date Value Ref Range Status   04/11/2017 7.8 (L) 13.7 - 17.3 g/dL Final   04/10/2017 7.2 (L) 13.7 - 17.3 g/dL Final   04/09/2017 7.7 (L) 13.7 - 17.3 g/dL Final      HCT Hematocrit   Date Value Ref Range Status   04/11/2017 24.8 (L) 39.0 - 49.0 % Final   04/10/2017 23.5 (L) 39.0 - 49.0 % Final   04/09/2017 24.6 (L) 39.0 - 49.0 % Final      Platlets Platelets   Date Value Ref Range Status   04/11/2017 222 150 - 450 10*3/mm3 Final   04/10/2017 199 150 - 450 10*3/mm3 Final   04/09/2017 236 150 - 450 10*3/mm3 Final          Imaging Results (last 24 hours)     Procedure Component Value Units Date/Time    CT Abdomen Pelvis Without Contrast [[id number]] Collected:  04/10/17 2120     Updated:  04/10/17 2133    Narrative:         [facility] [place]    Patient Name: INGRID HARRIS    ORDERING: ROSA MARIA HOLT    ATTENDING: RIC GUILLORY     REFERRING: ROSA MARIA HOLT  -----------------------    PROCEDURE: CT abdomen and pelvis without contrast on 4/10/2017    CLINICAL INDICATION:  Bilateral ureteral stones, acute renal  insufficiency, right  hydronephrosis    TECHNIQUE: Multiple axial images are obtained throughout the  abdomen and pelvis without the administration of contrast. This  study was performed with techniques to keep radiation doses as  low as reasonably achievable, (ALARA).   Total DLP is 5847.1 mGy*cm.    COMPARISON: 2/10/2017     FINDINGS:     ABDOMEN: There has been development of moderate size bilateral  pleural effusions with adjacent bibasilar atelectasis and likely  component of pneumonia in the right lower lobe. Right ureteral  stent is noted extending from the upper pole collecting system of  the right kidney into the right aspect of the bladder and appears  in good position. There remains a large right renal pelvis stone  measuring up to 2.4 cm in greatest diameter with other smaller  nonobstructing right renal stones. There are no ureteral stones  and no hydronephrosis. No left-sided renal or ureteral stone is  now noted. Stable left adrenal nodule has Hounsfield unit  measurements consistent with an adenoma. The unenhanced solid  abdominal organs are otherwise unremarkable. There is no  abdominal adenopathy. There is no free fluid or free air within  the abdomen. The abdominal portion of the GI tract is  unremarkable.    Pelvis: There is a moderate-sized umbilical hernia containing  only fat. Meier catheter extends into the decompressed bladder.  There is no free fluid in the pelvis. There is no pelvic  adenopathy. There is mild diverticulosis. The pelvic portion of  the GI tract is otherwise unremarkable. Degenerative changes are  noted in the spine and hips.      Impression:       CONCLUSION:   1. Bilateral pleural effusions with bibasilar atelectasis and  likely component of pneumonia at least in the right lower lobe.  2. Right ureteral stent in good position with continued right  nephrolithiasis without hydronephrosis or ureteral stone.  3. Moderate-sized umbilical hernia containing fat.  4. Mild  diverticulosis.    Electronically signed by:  Benjamín Roy  4/10/2017 9:32 PM  CDT Workstation: [id number]           I reviewed the patient's new clinical results.  I reviewed the patient's new imaging results and agree with the interpretation.     ASSESSMENT/PLAN:   Assessment/Plan   Principal Problem:    NSTEMI (non-ST elevated myocardial infarction)  Active Problems:    Type 2 diabetes mellitus    Osteoarthritis of multiple joints    Essential hypertension    Bacterial pneumonia    Morbid obesity with BMI of 50.0-59.9, adult    Acute cystitis    Heart failure with preserved ejection fraction, borderline, class III    Pulmonary hypertension    Anemia      Hgb is slightly improved.  Will continue with care as per Pulmonology and Nephrology.  Will follow the H/H.  I discussed the patients findings and my recommendations with patient.      Himanshu Tovar MD  04/11/17  8:49 AM

## 2017-04-11 NOTE — PLAN OF CARE
Problem: Patient Care Overview (Adult)  Goal: Plan of Care Review  Outcome: Ongoing (interventions implemented as appropriate)    04/10/17 1846 04/11/17 0556   Coping/Psychosocial Response Interventions   Plan Of Care Reviewed With patient;significant other --    Patient Care Overview   Progress no change --    Outcome Evaluation   Outcome Summary/Follow up Plan --  respiratory status is steadily improving. Pt seems to feel better this morning. Up in chair at 0500 this AM with minor assistance. Pt tolerated activity well. Creatnine elevated from yesterdays lab value. Potential plan for temporary dialysis in the future if renal function does not improve       Goal: Adult Individualization and Mutuality  Outcome: Ongoing (interventions implemented as appropriate)  Goal: Discharge Needs Assessment  Outcome: Ongoing (interventions implemented as appropriate)    Problem: Fall Risk (Adult)  Goal: Identify Related Risk Factors and Signs and Symptoms  Outcome: Ongoing (interventions implemented as appropriate)  Goal: Absence of Falls  Outcome: Ongoing (interventions implemented as appropriate)    Problem: Respiratory Insufficiency (Adult)  Goal: Identify Related Risk Factors and Signs and Symptoms  Outcome: Ongoing (interventions implemented as appropriate)  Goal: Acid/Base Balance  Outcome: Ongoing (interventions implemented as appropriate)  Goal: Effective Ventilation  Outcome: Ongoing (interventions implemented as appropriate)

## 2017-04-11 NOTE — PROGRESS NOTES
CRITICAL CARE PROGRESS NOTE  Abby Fu MD    [facility] CRITICAL CARE  4/11/2017        Blake Chavez  [id number]  1955  61 y.o. male            LOS: 6 days   Javed Childers DO    Chief Complaint/Reason for visit: F/u acute hypoxic respiratory failure, HAP    Subjective     Interval History:   History taken from: patient/ chart    RHC showed volume overload and HFpEF. Remains net + despite trial of bumex. Cr continues to rise. States his breathing is some better, but still dyspneic with movement and orthopnea. Sitting in chair this AM.    Review of Systems:   Review of Systems   Constitutional: Negative for fever.   Respiratory: Positive for cough and shortness of breath. Negative for wheezing.    Cardiovascular: Positive for leg swelling. Negative for chest pain and palpitations.   Gastrointestinal: Negative for abdominal pain.     All systems were reviewed and negative except as noted above in the HPI.    Medical history, surgical history, social history, family history reviewed    Objective     Intake/Output:    Intake/Output Summary (Last 24 hours) at 04/11/17 0730  Last data filed at 04/11/17 0500   Gross per 24 hour   Intake             2078 ml   Output              970 ml   Net             1108 ml       Nutrition: PO    Infusions:       Respiratory:       Vital Sign Min/Max for last 24 hours:  Temp  Min: 98.4 °F (36.9 °C)  Max: 99.4 °F (37.4 °C)   BP  Min: 90/52  Max: 129/76   Pulse  Min: 78  Max: 96   Resp  Min: 15  Max: 26   SpO2  Min: 88 %  Max: 100 %   Flow (L/min)  Min: 5  Max: 6   Weight  Min: 394 lb 10 oz (179 kg)  Max: 394 lb 10 oz (179 kg)     Physical Exam:  98.9 °F (37.2 °C) (Temporal Artery ) 88 120/77 18 96% (!) 394 lb 10 oz (179 kg) Body mass index is 52.58 kg/(m^2).  Physical Exam   Constitutional: He is oriented to person, place, and time. Vital signs are normal. He appears well-developed and well-nourished.   morbidly obese   HENT:   Head:  Normocephalic and atraumatic.   Nose: Nose normal.   Mouth/Throat: Mucous membranes are normal.   Eyes: EOM are normal.   Neck:   Large neck circumference   Cardiovascular: Normal rate, regular rhythm and normal heart sounds.  Exam reveals no gallop.    No murmur heard.  Pulmonary/Chest: Effort normal. No accessory muscle usage. No respiratory distress. He has no wheezes. He has no rhonchi.   Abdominal: Soft. Normal appearance and bowel sounds are normal. There is no tenderness.   Morbidly obese   Neurological: He is alert and oriented to person, place, and time.   Skin: Skin is warm and dry. No cyanosis. Nails show no clubbing.   Psychiatric: He has a normal mood and affect. His behavior is normal. Judgment normal. Cognition and memory are normal.       Central Lines/PICC: absent     Results Review:  I personally reviewed the patient's new clinical results.   Lab Results (last 24 hours)     Procedure Component Value Units Date/Time    O2 Sat Run-Pulmonary Artery [[id number]]  (Abnormal) Collected:  04/10/17 1042    Specimen:  Blood Updated:  04/10/17 1044     Oxygen Saturation, Pulmonary Artery 65.3 (L) %     POC Glucose Fingerstick [[id number]]  (Abnormal) Collected:  04/10/17 0806    Specimen:  Blood Updated:  04/10/17 1446     Glucose 170 (H) mg/dL       Meter: [id number]: [id number] [employer]       POC Glucose Fingerstick [[id number]]  (Abnormal) Collected:  04/10/17 1118    Specimen:  Blood Updated:  04/10/17 1447     Glucose 161 (H) mg/dL       Meter: [id number]: [id number] [employer]       Blood Culture [[id number]]  (Normal) Collected:  04/05/17 1712    Specimen:  Blood from Arm, Left Updated:  04/10/17 1801     Blood Culture No growth at 5 days    Blood Culture [[id number]]  (Normal) Collected:  04/05/17 1702    Specimen:  Blood from Arm, Right Updated:  04/10/17 1801     Blood Culture No growth at 5 days    POC Glucose Fingerstick [[id number]]  (Abnormal) Collected:  04/10/17 1707    Specimen:   Blood Updated:  04/10/17 1844     Glucose 188 (H) mg/dL       Sliding Scale AdminMeter: [id number]: [id number] [employer]       POC Glucose Fingerstick [[id number]]  (Abnormal) Collected:  04/10/17 2114    Specimen:  Blood Updated:  04/10/17 2142     Glucose 210 (H) mg/dL       Sliding Scale AdminMeter: [id number]: [id number] KENNY JHAVERI       CBC (No Diff) [[id number]]  (Abnormal) Collected:  04/11/17 0226    Specimen:  Blood Updated:  04/11/17 0333     WBC 14.36 (H) 10*3/mm3      RBC 3.25 (L) 10*6/mm3      Hemoglobin 7.8 (L) g/dL      Hematocrit 24.8 (L) %      MCV 76.3 (L) fL      MCH 24.0 (L) pg      MCHC 31.5 g/dL      RDW 15.5 (H) %      RDW-SD 43.8 fl      MPV 9.0 fL      Platelets 222 10*3/mm3     Renal Function Panel [[id number]]  (Abnormal) Collected:  04/11/17 0226    Specimen:  Blood Updated:  04/11/17 0400     Glucose 172 (H) mg/dL      BUN 66 (H) mg/dL      Creatinine 4.81 (H) mg/dL      Sodium 144 mmol/L      Potassium 3.9 mmol/L      Chloride 110 mmol/L      CO2 17.0 (L) mmol/L      Calcium 8.3 (L) mg/dL      Albumin 2.90 (L) g/dL      Phosphorus 6.8 (H) mg/dL      Anion Gap 17.0 (H) mmol/L      BUN/Creatinine Ratio 13.7     eGFR Non African Amer 12 (L) mL/min/1.73     POC Glucose Fingerstick [[id number]]  (Abnormal) Collected:  04/11/17 0621    Specimen:  Blood Updated:  04/11/17 0635     Glucose 167 (H) mg/dL       Sliding Scale AdminMeter: [id number]: [id number] KENNY JHAVERI             Results from last 7 days  Lab Units 04/09/17  0435   PH, ARTERIAL pH units 7.341*   PO2 ART mm Hg 95.5   PCO2, ARTERIAL mm Hg 37.1   HCO3 ART mmol/L 19.6*     Lab Results   Component Value Date    BLOODCX No growth at 5 days 04/05/2017     Lab Results   Component Value Date    URINECX >100,000 CFU/mL Enterococcus faecalis (A) 04/07/2017       I independently reviewed the patient's new imaging, including images and reports.  Imaging Results (last 24 hours)     Procedure Component Value Units  Date/Time    XR Chest 1 View [[id number]] Collected:  04/10/17 0635     Updated:  04/10/17 0637    Narrative:       Exam: AP portable chest    INDICATION: Respiratory failure    COMPARISON: 4/8/2017    FINDINGS: AP portable chest. Heart is enlarged. The pulmonary  vascularity is prominent. No change in bilateral patchy airspace  opacity. Cannot exclude small effusions.      Impression:       No significant interval change.    Electronically signed by:  Vicente Nascimento MD  4/10/2017 6:36 AM  CDT Workstation: [id number]            All medications reviewed.     atorvastatin 80 mg Oral Nightly   bumetanide 2 mg Intravenous Q12H   clopidogrel 75 mg Oral Daily   ferrous sulfate 324 mg Oral BID With Meals   gabapentin 100 mg Oral Nightly   heparin (porcine) 5,000 Units Subcutaneous Q8H   hydrALAZINE 25 mg Oral BID   insulin aspart 0-7 Units Subcutaneous 4x Daily AC & at Bedtime   ipratropium-albuterol 3 mL Nebulization 4x Daily - RT   Linezolid 600 mg Intravenous Q12H   magic butt ointment  Topical BID   metoprolol tartrate 25 mg Oral Q12H   pantoprazole 40 mg Oral Daily   piperacillin-tazobactam 2.25 g Intravenous Q6H         Assessment/Plan     ASSESSMENT:  # Acute hypoxemic respiratory failure- likely multifactorial from HAP and volume overload  # Probable HAP  # Pulmonary edema/ volume overload  # PVH  # NSTEMI- troponin trending down  # Morbid obesity  # Probable GRAHAM/ OHS  # HFpEF  # BRANDO  # DM  # Anemia- likely due to blood draws in setting of kidney dz  # UTI      PLAN:  -Wean O2 to keep sats >89%. No role for routine ABGs.   -CPAP QHS and prn as tolerated  -Cont empiric vanc/ zosyn. Complete 8d total empiric abx course for HAP.  -Sputum cx with normal gricelda  -Duonebs QID  -OOB to chair to optimize respiratory mechanics  -Pulmonary toilet  -Cardiac management per Dr. Dobbins  -Need HD for UF given worsening volume overload and renal fxn  -Needs sleep study as outpt     VTE Prophylaxis: Heparin TID  Stress Ulcer  Prophylaxis: Protonix    Stable for transfer to floor per primary team.    Critical Care Time Spent: 21 minutes  I personally provided care to this critically ill patient as documented above.  Critical care time does not include time spent on separately billed procedures.  None of my critical care time was concurrent with other critical care providers.         This document has been electronically signed by Abby Fu MD on April 11, 2017 7:30 AM      [phone]    EMR Dragon/Transcription disclaimer:     Much of this encounter note is an electronic transcription/translation of spoken language to printed text. The electronic translation of spoken language may permit erroneous, or at times, nonsensical words or phrases to be inadvertently transcribed; Although I have reviewed the note for such errors, some may still exist.

## 2017-04-11 NOTE — PLAN OF CARE
Problem: Patient Care Overview (Adult)  Goal: Plan of Care Review  Outcome: Ongoing (interventions implemented as appropriate)  Goal: Adult Individualization and Mutuality  Outcome: Ongoing (interventions implemented as appropriate)  Goal: Discharge Needs Assessment  Outcome: Ongoing (interventions implemented as appropriate)    Problem: Fall Risk (Adult)  Goal: Absence of Falls  Outcome: Ongoing (interventions implemented as appropriate)    Problem: Respiratory Insufficiency (Adult)  Goal: Identify Related Risk Factors and Signs and Symptoms  Outcome: Ongoing (interventions implemented as appropriate)  Goal: Acid/Base Balance  Outcome: Ongoing (interventions implemented as appropriate)  Goal: Effective Ventilation  Outcome: Ongoing (interventions implemented as appropriate)

## 2017-04-11 NOTE — PROGRESS NOTES
"[facility]  [address] [place] [address]. [address]  T - [phone]  F - [phone]     Progress Note          PATIENT  DEMOGRAPHICS   PATIENT NAME: Blake Chavez                      PHYSICIAN: Alanna Brand MD  : 1955  MRN: [id number]   LOS: 6 days    Patient Care Team:  Himanshu Tovar MD as PCP - General (Family Medicine)  Subjective   SUBJECTIVE   Soa mildly improved s/p Bumex, right heart cath, and transfusion 2 Units.  Currently sitting in chair bedside.        Objective   OBJECTIVE   Vital Signs  Temp:  [98.4 °F (36.9 °C)-99.4 °F (37.4 °C)] 98.4 °F (36.9 °C)  Heart Rate:  [78-99] 83  Resp:  [15-26] 17  BP: ()/(50-77) 120/59    Flowsheet Rows         First Filed Value    Admission Height  72.99\" (185.4 cm) Documented at 2017 1542    Admission Weight  (!)  386 lb 6.4 oz (175 kg) Documented at 2017 1659           I/O last 3 completed shifts:  In: 3503 [P.O.:1735; I.V.:700; Blood:618; IV Piggyback:450]  Out: 1445 [Urine:1445]    PHYSICAL EXAM    Physical Exam   air entry bilaterally decrease at bases  Heart regular rate and rhythm no gallop.  Abdomen obese soft nontender distended bowel sounds are present.  Extremities trace edema in both ankles    RESULTS   Results Review:      Results from last 7 days  Lab Units 17  0226 04/10/17  0155 17  0435 17  0300  17  0600  17  1147   SODIUM mmol/L 144 143  --  144  < > 148*  < > 149*   SODIUM, ARTERIAL mmol/L  --   --  142.9  --   < >  --   < >  --    POTASSIUM mmol/L 3.9 4.0  --  4.2  < > 4.4  < > 4.8   CHLORIDE mmol/L 110 109  --  114*  < > 113*  < > 113*   TOTAL CO2 mmol/L 17.0* 19.0*  --  17.0*  < > 20.0*  < > 20.0*   BUN mg/dL 66* 62*  --  60*  < > 48*  < > 40*   CREATININE mg/dL 4.81* 3.89*  --  3.69*  < > 3.22*  < > 2.99*   CALCIUM mg/dL 8.3* 8.0*  --  8.3*  < > 9.0  < > 9.5   BILIRUBIN mg/dL  --  0.2  --   --   --  0.4  --  0.4   ALK PHOS U/L  --  77  --   --   --  " 106  --  120   ALT (SGPT) U/L  --  17*  --   --   --  12*  --  12*   AST (SGOT) U/L  --  36  --   --   --  23  --  19   GLUCOSE mg/dL 172* 209*  --  159*  < > 164*  < > 180*   GLUCOSE, ARTERIAL mmol/L  --   --  171  --   < >  --   < >  --    < > = values in this interval not displayed.    Estimated Creatinine Clearance: 26.5 mL/min (by C-G formula based on Cr of 4.81).      Results from last 7 days  Lab Units 04/11/17  0226 04/10/17  0155 04/09/17  0300   PHOSPHORUS mg/dL 6.8* 5.6* 5.5*               Results from last 7 days  Lab Units 04/11/17  0226 04/10/17  0155 04/09/17  0300 04/08/17  0216 04/07/17  0600   WBC 10*3/mm3 14.36* 14.76* 15.39* 11.96* 16.39*   HEMOGLOBIN g/dL 7.8* 7.2* 7.7* 7.2* 9.4*   PLATELETS 10*3/mm3 222 199 236 213 261         Results from last 7 days  Lab Units 04/05/17  1712   INR  1.28*         Imaging Results (last 24 hours)     Imaging Results (last 24 hours)     Procedure Component Value Units Date/Time    CT Abdomen Pelvis Without Contrast [[id number]] Collected:  04/10/17 2120     Updated:  04/10/17 2133    Narrative:         [facility]    Patient Name: INGRID HARRIS    ORDERING: ROSA MARIA HOLT    ATTENDING: RIC GUILLORY     REFERRING: ROSA MARIA HOLT  -----------------------    PROCEDURE: CT abdomen and pelvis without contrast on 4/10/2017    CLINICAL INDICATION:  Bilateral ureteral stones, acute renal  insufficiency, right hydronephrosis    TECHNIQUE: Multiple axial images are obtained throughout the  abdomen and pelvis without the administration of contrast. This  study was performed with techniques to keep radiation doses as  low as reasonably achievable, (ALARA).   Total DLP is 5847.1 mGy*cm.    COMPARISON: 2/10/2017     FINDINGS:     ABDOMEN: There has been development of moderate size bilateral  pleural effusions with adjacent bibasilar atelectasis and likely  component of pneumonia in the right lower lobe. Right ureteral  stent is noted extending from the  upper pole collecting system of  the right kidney into the right aspect of the bladder and appears  in good position. There remains a large right renal pelvis stone  measuring up to 2.4 cm in greatest diameter with other smaller  nonobstructing right renal stones. There are no ureteral stones  and no hydronephrosis. No left-sided renal or ureteral stone is  now noted. Stable left adrenal nodule has Hounsfield unit  measurements consistent with an adenoma. The unenhanced solid  abdominal organs are otherwise unremarkable. There is no  abdominal adenopathy. There is no free fluid or free air within  the abdomen. The abdominal portion of the GI tract is  unremarkable.    Pelvis: There is a moderate-sized umbilical hernia containing  only fat. Meier catheter extends into the decompressed bladder.  There is no free fluid in the pelvis. There is no pelvic  adenopathy. There is mild diverticulosis. The pelvic portion of  the GI tract is otherwise unremarkable. Degenerative changes are  noted in the spine and hips.      Impression:       CONCLUSION:   1. Bilateral pleural effusions with bibasilar atelectasis and  likely component of pneumonia at least in the right lower lobe.  2. Right ureteral stent in good position with continued right  nephrolithiasis without hydronephrosis or ureteral stone.  3. Moderate-sized umbilical hernia containing fat.  4. Mild diverticulosis.    Electronically signed by:  Benjamín Roy  4/10/2017 9:32 PM  CDT Workstation: [id number]                 MEDICATIONS      atorvastatin 80 mg Oral Nightly   bumetanide 2 mg Intravenous Q12H   clopidogrel 75 mg Oral Daily   ferrous sulfate 324 mg Oral BID With Meals   gabapentin 100 mg Oral Nightly   heparin (porcine) 5,000 Units Subcutaneous Q8H   hydrALAZINE 25 mg Oral BID   insulin aspart 0-7 Units Subcutaneous 4x Daily AC & at Bedtime   ipratropium-albuterol 3 mL Nebulization 4x Daily - RT   Linezolid 600 mg Intravenous Q12H   magic butt  ointment  Topical BID   metoprolol tartrate 25 mg Oral Q12H   pantoprazole 40 mg Oral Daily   piperacillin-tazobactam 2.25 g Intravenous Q6H          Assessment/Plan   ASSESSMENT / PLAN    Principal Problem:    NSTEMI (non-ST elevated myocardial infarction)  Active Problems:    Type 2 diabetes mellitus    Osteoarthritis of multiple joints    Essential hypertension    Bacterial pneumonia    Morbid obesity with BMI of 50.0-59.9, adult    Acute cystitis    Heart failure with preserved ejection fraction, borderline, class III    Pulmonary hypertension    Anemia    Assessment  1.  Acute kidney injury/ATN : non oliguric. renal function continues to worsen, Cr 4.81 and BUN 66 (eGFR 12) today from 3.9 and 62 yesterday, currently non-oliguric. on ckd 3/4 prior cr came down to 2.6.  2.  Non-ST elevation myocardial infarction.  3.  Anemia. Hgb 7.8  4.  HFpEF, FC-III, Stage C.  5.  Nephrolithiasis s/p cysto, bilateral retrograde, right ureteroscopy, lithotripsy with stent placement, bladder stone lithotripsy 3/29/17. Repeat CT scan showed no stones.  No hydro. No urological intervention at this time, until medically stable.  Plan  1.  Appears hypervolemic on exam and cxr/ s/p lasix and Bumex. RHC showed elevated PCWP (21 mm Hg).  dw pt and will proceed with hd and uf today and tomorrow. Risk and benefit are dw pt. (hypotension, dialyzer reaction etc) he agrees. Will inform CT surgery for temp cath placement. Dc bumex. 2 units prbc with hd today            This document has been electronically signed by Gentry Subramanian MD on April 11, 2017 11:02 AM      I discussed the patients findings and my recommendations with patient and family   I have reviewed the case with the resident. I have also examined and seen the patient. I agree with the assessment and plan as documented in the resident's note.    Alanna Brand MD  04/11/17  11:02 AM

## 2017-04-11 NOTE — PROGRESS NOTES
"Cardiovascular Daily Progress Note  Bharath Dobbins M.D., Ph.D., [facility]      Subjective     Interval History:   Remains in CCU. Stable dyspnea.  Status post right heart catheterization.  Creatinine is worsened.  Complains of right ankle pain this morning.    Review of Systems - History obtained from chart review and the patient  Respiratory ROS: positive for - shortness of breath  Cardiovascular ROS: negative for - chest pain    Objective     Vital Sign Min/Max for last 24 hours  Temp  Min: 98.4 °F (36.9 °C)  Max: 99.4 °F (37.4 °C)   BP  Min: 90/52  Max: 129/76   Pulse  Min: 78  Max: 96   Resp  Min: 15  Max: 26   SpO2  Min: 88 %  Max: 100 %   Flow (L/min)  Min: 5  Max: 6   Weight  Min: 394 lb 10 oz (179 kg)  Max: 394 lb 10 oz (179 kg)     Flowsheet Rows         First Filed Value    Admission Height  72.99\" (185.4 cm) Documented at 04/06/2017 1542    Admission Weight  (!)  386 lb 6.4 oz (175 kg) Documented at 04/05/2017 1659        Last 3 weights    04/09/17  0615 04/10/17  0518 04/10/17  1834   Weight: (!) 392 lb (178 kg) (!) 394 lb (179 kg) (!) 394 lb 10 oz (179 kg)     Body mass index is 52.58 kg/(m^2).    Physical Exam:   GEN: alert, appears stated age and cooperative wearing BiPAP  Body Habitus: morbidly obese  HEENT: Head: Normocephalic, no lesions, without obvious abnormality.  Neck / Thyroid: Supple, no masses, nodes, nodules or enlargement. No arcus senilis, xanthelasma or xanthomas.   JVP: 15 cm of water at 45 degrees HJR: Present    Carotid:  Upstroke: easily palpated bilaterally Volume: Normal.    Carotid Bruit:  None  Subclavian Bruit: Not present.    Chest:  Normal Excursion: Good    I:E: Good  Pulmonary:clear to auscultation, no wheezes, rales or rhonchi, symmetric air entry      Precordium:  No palpable heaves or thrusts. P2 is not palpable.   [place]:  normal size and placement Palpable S4: Not present.   Heart rate: normal  Heart Rhythm: regular     Heart Sounds: S1: normal intensity  S2: increased " P2  S3: absent   S4: absent  Opening Snap: absent  A2-OS:  N/A  Pericardial rub: absent    Ejection click: None      Murmurs: Systolic: none  Diastolic: none  Extremity: 2+ edema; right cephalic access is C/D/I  Pulses: Right radial artery has 2+ (normal) pulse and Left radial artery has 2+ (normal) pulse         DATA REVIEWED:    Right heart pressures:  RA: 15 mmHg  RV: 43/9 with a mean right ventricular pressure of 16    PA: 47/21 with a mean PA pressure 32  PCWP:  21 mmHg   DP     Resistance:  SVR: 818 dynes · sec/cm5 PVR: 160 dynes · sec/cm5     Saturations:  PA: 65.3%         Lab Results (last 24 hours)     Procedure Component Value Units Date/Time    O2 Sat Run-Pulmonary Artery [[id number]]  (Abnormal) Collected:  04/10/17 104    Specimen:  Blood Updated:  04/10/17 1044     Oxygen Saturation, Pulmonary Artery 65.3 (L) %     POC Glucose Fingerstick [[id number]]  (Abnormal) Collected:  04/10/17 0806    Specimen:  Blood Updated:  04/10/17 1446     Glucose 170 (H) mg/dL       Meter: [id number]: [id number] [employer]       POC Glucose Fingerstick [[id number]]  (Abnormal) Collected:  04/10/17 1118    Specimen:  Blood Updated:  04/10/17 1447     Glucose 161 (H) mg/dL       Meter: [id number]: [id number] [employer]       Blood Culture [[id number]]  (Normal) Collected:  17 171    Specimen:  Blood from Arm, Left Updated:  04/10/17 180     Blood Culture No growth at 5 days    Blood Culture [[id number]]  (Normal) Collected:  17 170    Specimen:  Blood from Arm, Right Updated:  04/10/17 180     Blood Culture No growth at 5 days    POC Glucose Fingerstick [[id number]]  (Abnormal) Collected:  04/10/17 170    Specimen:  Blood Updated:  04/10/17 1844     Glucose 188 (H) mg/dL       Sliding Scale AdminMeter: [id number]: [id number] [employer]       POC Glucose Fingerstick [[id number]]  (Abnormal) Collected:  04/10/17 2114    Specimen:  Blood Updated:  04/10/17 2142     Glucose 210 (H)  mg/dL       Sliding Scale AdminMeter: [id number]: [id number] K[employer]NY JHAVER[employer]       CBC (No Diff) [[id number]]  (Abnormal) Collected:  04/11/17 0226    Specimen:  Blood Updated:  04/11/17 0333     WBC 14.36 (H) 10*3/mm3      RBC 3.25 (L) 10*6/mm3      Hemoglobin 7.8 (L) g/dL      Hematocrit 24.8 (L) %      MCV 76.3 (L) fL      MCH 24.0 (L) pg      MCHC 31.5 g/dL      RDW 15.5 (H) %      RDW-SD 43.8 fl      MPV 9.0 fL      Platelets 222 10*3/mm3     Renal Function Panel [[id number]]  (Abnormal) Collected:  04/11/17 0226    Specimen:  Blood Updated:  04/11/17 0400     Glucose 172 (H) mg/dL      BUN 66 (H) mg/dL      Creatinine 4.81 (H) mg/dL      Sodium 144 mmol/L      Potassium 3.9 mmol/L      Chloride 110 mmol/L      CO2 17.0 (L) mmol/L      Calcium 8.3 (L) mg/dL      Albumin 2.90 (L) g/dL      Phosphorus 6.8 (H) mg/dL      Anion Gap 17.0 (H) mmol/L      BUN/Creatinine Ratio 13.7     eGFR Non African Amer 12 (L) mL/min/1.73     POC Glucose Fingerstick [[id number]]  (Abnormal) Collected:  04/11/17 0621    Specimen:  Blood Updated:  04/11/17 0635     Glucose 167 (H) mg/dL       Sliding Scale AdminMeter: [id number]: [id number] KENNY JHAVERI           Imaging Results (last 24 hours)     Procedure Component Value Units Date/Time    US Venous Doppler Lower Extremity Bilateral (duplex) [[id number]] Collected:  04/06/17 1007     Updated:  04/06/17 1010    Narrative:       Patient Name:  INGRID HARRIS  Patient ID:  [id number]   Ordering:  SHERON KELLER  Attending:  RIC GUILLORY  Referring:  SHERON KLELER  ------------------------------------------------  Doppler duplex venous examination bilateral lower extremities.  CLINICAL INDICATION: Leg swelling.      COMPARISON: None    FINDINGS:    The common femoral,  femoral, and popliteal veins of the  bilateral     lower extremity are well identified and compress  normally.  Doppler signals are heard either spontaneously or on  distal compression.  No evidence of  intraluminal thrombus was  noted.     The visualized posterior calf veins are unremarkable.      Impression:       CONCLUSION:  No evidence of deep venous thrombosis in the common  femoral, superficial femoral veins, or popliteal veins of the  bilateral lower extremities.         Electronically signed by:  Leo Kelly MD  4/6/2017 10:09 AM CDT  Workstation: [id number]    XR Chest 1 View [[id number]] Collected:  04/06/17 2132     Updated:  04/06/17 2137    Narrative:       Exam: AP portable chest    INDICATION: Dyspnea    COMPARISON: 4/5/2017    FINDINGS: AP portable chest. Heart size upper limits of normal.  Interval increase in the bilateral airspace opacity and  infiltrates. Pulmonary vascularity is mildly prominent. Cannot  exclude a small left pleural effusion.      Impression:       Interval increase in the bilateral airspace  opacity/infiltrates    Electronically signed by:  Vicente Nascimento MD  4/6/2017 9:36 PM CDT  Workstation: [id number]        · Left Ventricle: Estimated EF appears to be in the range of 51 - 55%  · Global left ventricular wall motion appears abnormal. The left ventricular cavity is moderate-to-severely dilated  · Left ventricular diastolic dysfunction is noted (grade II w/high LAP) consistent with pseudonormalization. Elevated left atrial pressure  · Right Ventricle: Normal wall thickness, systolic function and septal motion noted. Right ventricular cavity is mildly dilated.  · The inferior vena cava is dilated. Normal IVC inspiratory collapse of greater than 50% noted.  · Mild mitral valve regurgitation is present.  · Trace to mild tricuspid valve regurgitation is present.  · Calculated right ventricular systolic pressure from tricuspid regurgitation is 66 mmHg  · Severe pulmonary hypertension is present.  · There is no evidence of pericardial effusion.    LE Duplex  FINDINGS:     The common femoral, femoral, and popliteal veins of the  bilateral lower extremity are well identified and  compress  normally. Doppler signals are heard either spontaneously or on  distal compression. No evidence of intraluminal thrombus was  noted.      The visualized posterior calf veins are unremarkable.    Assessment/Plan      1. HFpEF. NYHA stage C; FC-III. Today, he remains markedly hypervolemic, but perfused.  Worsening renal function.  I do not believe we will be able to obtain adequate diuresis with IV diuretics.  -RRT per nephrology  -Hydralazine and Lopressor  -Continue 2 L fluid restriction and low sodium diet    2. PVH with in-proportion DPG. He is WHO-Class-II.   -As above  -Will need outpatient PFTs and sleep evaluation for GRAHAM    3. NSTEMI.  The patient is chest pain-free and hemodynamically stable.  He is currently tolerating OMT.  Deferred considerations for LHC given his worsening renal function.  -ASA 81mg; Plavix 75mg PO daily; Atorvastatin 80mg  -Lopressor  -Deferred consideration for LHC pending improvement in renal function    Thank you for allowing me to participate in the care of your patient.  Will continue to follow.          This document has been electronically signed by Bharath Dobbins MD PhD on April 11, 2017 7:13 AM

## 2017-04-12 LAB
ABO + RH BLD: NORMAL
ALBUMIN SERPL-MCNC: 3 G/DL (ref 3.4–4.8)
ANION GAP SERPL CALCULATED.3IONS-SCNC: 16 MMOL/L (ref 5–15)
BH BB BLOOD EXPIRATION DATE: NORMAL
BH BB BLOOD TYPE BARCODE: 9500
BH BB DISPENSE STATUS: NORMAL
BH BB PRODUCT CODE: NORMAL
BH BB UNIT NUMBER: NORMAL
BUN BLD-MCNC: 50 MG/DL (ref 7–21)
BUN/CREAT SERPL: 11.8 (ref 7–25)
CALCIUM SPEC-SCNC: 8 MG/DL (ref 8.4–10.2)
CHLORIDE SERPL-SCNC: 102 MMOL/L (ref 95–110)
CO2 SERPL-SCNC: 22 MMOL/L (ref 22–31)
CREAT BLD-MCNC: 4.25 MG/DL (ref 0.7–1.3)
DEPRECATED RDW RBC AUTO: 43 FL (ref 35.1–43.9)
ERYTHROCYTE [DISTWIDTH] IN BLOOD BY AUTOMATED COUNT: 15.4 % (ref 11.5–14.5)
GASTROCULT GAST QL: POSITIVE
GFR SERPL CREATININE-BSD FRML MDRD: 14 ML/MIN/1.73 (ref 60–113)
GLUCOSE BLD-MCNC: 178 MG/DL (ref 60–100)
GLUCOSE BLDC GLUCOMTR-MCNC: 130 MG/DL (ref 70–130)
GLUCOSE BLDC GLUCOMTR-MCNC: 170 MG/DL (ref 70–130)
GLUCOSE BLDC GLUCOMTR-MCNC: 191 MG/DL (ref 70–130)
GLUCOSE BLDC GLUCOMTR-MCNC: 218 MG/DL (ref 70–130)
GLUCOSE BLDC GLUCOMTR-MCNC: 218 MG/DL (ref 70–130)
HCT VFR BLD AUTO: 27.5 % (ref 39–49)
HGB BLD-MCNC: 8.9 G/DL (ref 13.7–17.3)
INR PPP: 1.37 (ref 0.8–1.2)
MCH RBC QN AUTO: 24.7 PG (ref 26.5–34)
MCHC RBC AUTO-ENTMCNC: 32.4 G/DL (ref 31.5–36.3)
MCV RBC AUTO: 76.2 FL (ref 80–98)
PHOSPHATE SERPL-MCNC: 5.9 MG/DL (ref 2.4–4.4)
PLATELET # BLD AUTO: 215 10*3/MM3 (ref 150–450)
PMV BLD AUTO: 8.8 FL (ref 8–12)
POTASSIUM BLD-SCNC: 3.6 MMOL/L (ref 3.5–5.1)
PROTHROMBIN TIME: 16.9 SECONDS (ref 11.1–15.3)
RBC # BLD AUTO: 3.61 10*6/MM3 (ref 4.37–5.74)
SODIUM BLD-SCNC: 140 MMOL/L (ref 137–145)
UNIT  ABO: NORMAL
UNIT  RH: NORMAL
WBC NRBC COR # BLD: 13.93 10*3/MM3 (ref 3.2–9.8)

## 2017-04-12 PROCEDURE — 25010000002 HEPARIN (PORCINE) PER 1000 UNITS: Performed by: INTERNAL MEDICINE

## 2017-04-12 PROCEDURE — 99232 SBSQ HOSP IP/OBS MODERATE 35: CPT | Performed by: FAMILY MEDICINE

## 2017-04-12 PROCEDURE — 25010000002 PIPERACILLIN SOD-TAZOBACTAM PER 1 G: Performed by: INTERNAL MEDICINE

## 2017-04-12 PROCEDURE — 25010000002 LINEZOLID 600 MG/300ML SOLUTION: Performed by: FAMILY MEDICINE

## 2017-04-12 PROCEDURE — 85610 PROTHROMBIN TIME: CPT | Performed by: INTERNAL MEDICINE

## 2017-04-12 PROCEDURE — 94799 UNLISTED PULMONARY SVC/PX: CPT

## 2017-04-12 PROCEDURE — 99232 SBSQ HOSP IP/OBS MODERATE 35: CPT | Performed by: INTERNAL MEDICINE

## 2017-04-12 PROCEDURE — 63710000001 INSULIN ASPART PER 5 UNITS: Performed by: FAMILY MEDICINE

## 2017-04-12 PROCEDURE — 82270 OCCULT BLOOD FECES: CPT | Performed by: FAMILY MEDICINE

## 2017-04-12 PROCEDURE — 94760 N-INVAS EAR/PLS OXIMETRY 1: CPT

## 2017-04-12 PROCEDURE — 94660 CPAP INITIATION&MGMT: CPT

## 2017-04-12 PROCEDURE — 82962 GLUCOSE BLOOD TEST: CPT

## 2017-04-12 PROCEDURE — 80069 RENAL FUNCTION PANEL: CPT | Performed by: FAMILY MEDICINE

## 2017-04-12 PROCEDURE — 85027 COMPLETE CBC AUTOMATED: CPT | Performed by: FAMILY MEDICINE

## 2017-04-12 RX ORDER — LIDOCAINE HYDROCHLORIDE 10 MG/ML
0.1 INJECTION, SOLUTION EPIDURAL; INFILTRATION; INTRACAUDAL; PERINEURAL ONCE AS NEEDED
Status: DISCONTINUED | OUTPATIENT
Start: 2017-04-12 | End: 2017-04-13

## 2017-04-12 RX ORDER — IPRATROPIUM BROMIDE AND ALBUTEROL SULFATE 2.5; .5 MG/3ML; MG/3ML
3 SOLUTION RESPIRATORY (INHALATION) EVERY 4 HOURS PRN
Status: DISCONTINUED | OUTPATIENT
Start: 2017-04-12 | End: 2017-04-14 | Stop reason: HOSPADM

## 2017-04-12 RX ORDER — DEXTROSE AND SODIUM CHLORIDE 5; .45 G/100ML; G/100ML
30 INJECTION, SOLUTION INTRAVENOUS CONTINUOUS PRN
Status: DISCONTINUED | OUTPATIENT
Start: 2017-04-12 | End: 2017-04-14 | Stop reason: HOSPADM

## 2017-04-12 RX ORDER — HEPARIN SODIUM 1000 [USP'U]/ML
2000 INJECTION, SOLUTION INTRAVENOUS; SUBCUTANEOUS AS NEEDED
Status: DISCONTINUED | OUTPATIENT
Start: 2017-04-12 | End: 2017-04-14 | Stop reason: SDUPTHER

## 2017-04-12 RX ORDER — ALBUMIN (HUMAN) 12.5 G/50ML
12.5 SOLUTION INTRAVENOUS AS NEEDED
Status: ACTIVE | OUTPATIENT
Start: 2017-04-12 | End: 2017-04-13

## 2017-04-12 RX ORDER — SODIUM CHLORIDE 0.9 % (FLUSH) 0.9 %
1-10 SYRINGE (ML) INJECTION AS NEEDED
Status: DISCONTINUED | OUTPATIENT
Start: 2017-04-12 | End: 2017-04-13

## 2017-04-12 RX ADMIN — TAZOBACTAM SODIUM AND PIPERACILLIN SODIUM 2.25 G: 250; 2 INJECTION, SOLUTION INTRAVENOUS at 18:04

## 2017-04-12 RX ADMIN — IPRATROPIUM BROMIDE AND ALBUTEROL SULFATE 3 ML: 2.5; .5 SOLUTION RESPIRATORY (INHALATION) at 07:12

## 2017-04-12 RX ADMIN — TAZOBACTAM SODIUM AND PIPERACILLIN SODIUM 2.25 G: 250; 2 INJECTION, SOLUTION INTRAVENOUS at 05:30

## 2017-04-12 RX ADMIN — Medication: at 18:02

## 2017-04-12 RX ADMIN — ATORVASTATIN CALCIUM 80 MG: 40 TABLET, FILM COATED ORAL at 20:32

## 2017-04-12 RX ADMIN — TAZOBACTAM SODIUM AND PIPERACILLIN SODIUM 2.25 G: 250; 2 INJECTION, SOLUTION INTRAVENOUS at 23:03

## 2017-04-12 RX ADMIN — GABAPENTIN 100 MG: 100 CAPSULE ORAL at 21:44

## 2017-04-12 RX ADMIN — HEPARIN SODIUM 5000 UNITS: 5000 INJECTION, SOLUTION INTRAVENOUS; SUBCUTANEOUS at 20:34

## 2017-04-12 RX ADMIN — TAZOBACTAM SODIUM AND PIPERACILLIN SODIUM 2.25 G: 250; 2 INJECTION, SOLUTION INTRAVENOUS at 12:18

## 2017-04-12 RX ADMIN — HEPARIN SODIUM 5000 UNITS: 5000 INJECTION, SOLUTION INTRAVENOUS; SUBCUTANEOUS at 05:30

## 2017-04-12 RX ADMIN — HEPARIN SODIUM 2000 UNITS: 1000 INJECTION, SOLUTION INTRAVENOUS; SUBCUTANEOUS at 16:40

## 2017-04-12 RX ADMIN — Medication: at 09:24

## 2017-04-12 RX ADMIN — FERROUS SULFATE TAB EC 324 MG (65 MG FE EQUIVALENT) 324 MG: 324 (65 FE) TABLET DELAYED RESPONSE at 08:43

## 2017-04-12 RX ADMIN — CLOPIDOGREL BISULFATE 75 MG: 75 TABLET ORAL at 08:44

## 2017-04-12 RX ADMIN — Medication 10 ML: at 11:01

## 2017-04-12 RX ADMIN — HYDROCODONE BITARTRATE AND ACETAMINOPHEN 1 TABLET: 10; 325 TABLET ORAL at 08:43

## 2017-04-12 RX ADMIN — LINEZOLID 600 MG: 600 INJECTION, SOLUTION INTRAVENOUS at 23:08

## 2017-04-12 RX ADMIN — METOPROLOL TARTRATE 25 MG: 25 TABLET ORAL at 08:43

## 2017-04-12 RX ADMIN — INSULIN ASPART 3 UNITS: 100 INJECTION, SOLUTION INTRAVENOUS; SUBCUTANEOUS at 11:34

## 2017-04-12 RX ADMIN — INSULIN ASPART 2 UNITS: 100 INJECTION, SOLUTION INTRAVENOUS; SUBCUTANEOUS at 20:43

## 2017-04-12 RX ADMIN — LINEZOLID 600 MG: 600 INJECTION, SOLUTION INTRAVENOUS at 11:01

## 2017-04-12 RX ADMIN — PANTOPRAZOLE SODIUM 40 MG: 40 TABLET, DELAYED RELEASE ORAL at 08:43

## 2017-04-12 RX ADMIN — INSULIN ASPART 2 UNITS: 100 INJECTION, SOLUTION INTRAVENOUS; SUBCUTANEOUS at 08:34

## 2017-04-12 RX ADMIN — HYDROCODONE BITARTRATE AND ACETAMINOPHEN 1 TABLET: 10; 325 TABLET ORAL at 18:14

## 2017-04-12 RX ADMIN — Medication 10 ML: at 18:03

## 2017-04-12 RX ADMIN — FERROUS SULFATE TAB EC 324 MG (65 MG FE EQUIVALENT) 324 MG: 324 (65 FE) TABLET DELAYED RESPONSE at 18:02

## 2017-04-12 RX ADMIN — TAZOBACTAM SODIUM AND PIPERACILLIN SODIUM 2.25 G: 250; 2 INJECTION, SOLUTION INTRAVENOUS at 00:27

## 2017-04-12 RX ADMIN — HEPARIN SODIUM 5000 UNITS: 5000 INJECTION, SOLUTION INTRAVENOUS; SUBCUTANEOUS at 14:35

## 2017-04-12 NOTE — PROGRESS NOTES
"   LOS: 6 days   Patient Care Team:  Himanshu Tovar MD as PCP - General (Family Medicine)    Subjective     Subjective:  Symptoms:  (Urine clear, yellow, some sediment. HD now in room. Patient complains of fatigue. ).    Diet:  No nausea or vomiting.    Activity level: Impaired due to weakness.    Pain:  He reports no pain.        History taken from: patient chart    Objective     Vital Signs  Temp:  [97.5 °F (36.4 °C)-99 °F (37.2 °C)] 98.3 °F (36.8 °C)  Heart Rate:  [76-99] 83  Resp:  [16-26] 25  BP: ()/(50-78) 113/58    Objective:  General Appearance:  Not in pain and in no acute distress.    Vital signs: (most recent): Blood pressure 113/58, pulse 83, temperature 98.3 °F (36.8 °C), resp. rate 25, height 72.64\" (184.5 cm), weight (!) 394 lb 10 oz (179 kg), SpO2 97 %.  Vital signs are normal.  No fever.    Output: Producing urine.    HEENT: Normal HEENT exam.    Lungs:  Normal respiratory rate and normal effort.  He is not in respiratory distress.  There are decreased breath sounds.  No wheezes.    Heart: Normal rate.  Regular rhythm.  S1 normal and S2 normal.    Chest: Symmetric chest wall expansion.   Abdomen: Abdomen is soft and distended.  Bowel sounds are normal.   There is no abdominal tenderness.     Extremities: (Moderate edema with pitting, BLE.   )  Neurological: Patient is alert and oriented to person, place and time.  GCS score is 15.    Pupils:  Pupils are equal, round, and reactive to light.    Skin:  Warm and dry.  No cyanosis.             Results Review:    Lab Results (last 24 hours)     Procedure Component Value Units Date/Time    POC Glucose Fingerstick [[id number]]  (Abnormal) Collected:  04/10/17 2114    Specimen:  Blood Updated:  04/10/17 2142     Glucose 210 (H) mg/dL       Sliding Scale AdminMeter: [id number]: [id number] KENNY JHAVERI       CBC (No Diff) [[id number]]  (Abnormal) Collected:  04/11/17 0226    Specimen:  Blood Updated:  04/11/17 0333     WBC 14.36 (H) 10*3/mm3      RBC " 3.25 (L) 10*6/mm3      Hemoglobin 7.8 (L) g/dL      Hematocrit 24.8 (L) %      MCV 76.3 (L) fL      MCH 24.0 (L) pg      MCHC 31.5 g/dL      RDW 15.5 (H) %      RDW-SD 43.8 fl      MPV 9.0 fL      Platelets 222 10*3/mm3     Renal Function Panel [[id number]]  (Abnormal) Collected:  04/11/17 0226    Specimen:  Blood Updated:  04/11/17 0400     Glucose 172 (H) mg/dL      BUN 66 (H) mg/dL      Creatinine 4.81 (H) mg/dL      Sodium 144 mmol/L      Potassium 3.9 mmol/L      Chloride 110 mmol/L      CO2 17.0 (L) mmol/L      Calcium 8.3 (L) mg/dL      Albumin 2.90 (L) g/dL      Phosphorus 6.8 (H) mg/dL      Anion Gap 17.0 (H) mmol/L      BUN/Creatinine Ratio 13.7     eGFR Non African Amer 12 (L) mL/min/1.73     POC Glucose Fingerstick [[id number]]  (Abnormal) Collected:  04/11/17 0621    Specimen:  Blood Updated:  04/11/17 0635     Glucose 167 (H) mg/dL       Sliding Scale AdminMeter: [id number]: [id number] KENNY JHAVERI       POC Glucose Fingerstick [[id number]]  (Abnormal) Collected:  04/11/17 1127    Specimen:  Blood Updated:  04/11/17 1253     Glucose 205 (H) mg/dL       Sliding Scale AdminMeter: [id number]: [id number] PARTH SUGGS       Hepatitis B Surface Antigen [[id number]]  (Normal) Collected:  04/11/17 1814    Specimen:  Blood Updated:  04/11/17 1938     Hepatitis B Surface Ag Negative    Hepatitis B Surface Antibody [[id number]]  (Abnormal) Collected:  04/11/17 1814    Specimen:  Blood Updated:  04/11/17 1956     Hepatitis Bs Ab Index 10.20 (H)     Hep B S Ab Non-Immune (A)         Imaging Results (last 24 hours)     Procedure Component Value Units Date/Time    CT Abdomen Pelvis Without Contrast [[id number]] Collected:  04/10/17 2120     Updated:  04/10/17 2133    Narrative:         [facility] [place]    Patient Name: INGRID HARRIS    ORDERING: ROSA MARIA HOLT    ATTENDING: RIC GUILLORY     REFERRING: ROSA MARIA HOLT  -----------------------    PROCEDURE: CT abdomen and pelvis without  contrast on 4/10/2017    CLINICAL INDICATION:  Bilateral ureteral stones, acute renal  insufficiency, right hydronephrosis    TECHNIQUE: Multiple axial images are obtained throughout the  abdomen and pelvis without the administration of contrast. This  study was performed with techniques to keep radiation doses as  low as reasonably achievable, (ALARA).   Total DLP is 5847.1 mGy*cm.    COMPARISON: 2/10/2017     FINDINGS:     ABDOMEN: There has been development of moderate size bilateral  pleural effusions with adjacent bibasilar atelectasis and likely  component of pneumonia in the right lower lobe. Right ureteral  stent is noted extending from the upper pole collecting system of  the right kidney into the right aspect of the bladder and appears  in good position. There remains a large right renal pelvis stone  measuring up to 2.4 cm in greatest diameter with other smaller  nonobstructing right renal stones. There are no ureteral stones  and no hydronephrosis. No left-sided renal or ureteral stone is  now noted. Stable left adrenal nodule has Hounsfield unit  measurements consistent with an adenoma. The unenhanced solid  abdominal organs are otherwise unremarkable. There is no  abdominal adenopathy. There is no free fluid or free air within  the abdomen. The abdominal portion of the GI tract is  unremarkable.    Pelvis: There is a moderate-sized umbilical hernia containing  only fat. Meier catheter extends into the decompressed bladder.  There is no free fluid in the pelvis. There is no pelvic  adenopathy. There is mild diverticulosis. The pelvic portion of  the GI tract is otherwise unremarkable. Degenerative changes are  noted in the spine and hips.      Impression:       CONCLUSION:   1. Bilateral pleural effusions with bibasilar atelectasis and  likely component of pneumonia at least in the right lower lobe.  2. Right ureteral stent in good position with continued right  nephrolithiasis without hydronephrosis or  ureteral stone.  3. Moderate-sized umbilical hernia containing fat.  4. Mild diverticulosis.    Electronically signed by:  Benjamín Roy  4/10/2017 9:32 PM  CDT Workstation: [id number]    US Guided Vascular Access [[id number]] Resulted:  04/11/17 1700     Updated:  04/11/17 1700    Narrative:       This procedure was auto-finalized with no dictation required.           I reviewed the patient's new clinical results.  I reviewed the patient's new imaging results and agree with the interpretation.  I reviewed the patient's other test results and agree with the interpretation      Assessment/Plan     Principal Problem:    NSTEMI (non-ST elevated myocardial infarction)  Active Problems:    Type 2 diabetes mellitus    Osteoarthritis of multiple joints    Essential hypertension    Bacterial pneumonia    Morbid obesity with BMI of 50.0-59.9, adult    Acute cystitis    Heart failure with preserved ejection fraction, borderline, class III    Pulmonary hypertension    Anemia      Assessment:  (Nephrolithiasis status post cysto, bilateral retrograde, right ureteroscopy, lithotripsy with RIGHT J-stent placement, bladder stone lithotripsy 3/29/17.     CT scan from last night showed right renal calculi, negative for ureteric calculi, negative for hydronephrosis, and RIGHT J stent in good position.    Urine culture 4/7/11: Enterococcus faecalis, on Zyvox. Meier in place. ).     Plan:   (Plan for  intervention once medically stable, J stent remains. Meier in place. ).       KEITH Joy  04/11/17  7:56 PM

## 2017-04-12 NOTE — PROGRESS NOTES
"Cardiovascular Daily Progress Note  Bharath Dobbins M.D., Ph.D., [facility]      Subjective     Interval History:   Remains in CCU. Stable dyspnea.  HD per nephro. No CP.     Review of Systems - History obtained from chart review and the patient  Respiratory ROS: positive for - shortness of breath  Cardiovascular ROS: negative for - chest pain    Objective     Vital Sign Min/Max for last 24 hours  Temp  Min: 97.5 °F (36.4 °C)  Max: 98.9 °F (37.2 °C)   BP  Min: 94/54  Max: 148/65   Pulse  Min: 74  Max: 99   Resp  Min: 16  Max: 25   SpO2  Min: 93 %  Max: 100 %   Flow (L/min)  Min: 5  Max: 5   No Data Recorded     Flowsheet Rows         First Filed Value    Admission Height  72.99\" (185.4 cm) Documented at 04/06/2017 1542    Admission Weight  (!)  386 lb 6.4 oz (175 kg) Documented at 04/05/2017 1659        Last 3 weights    04/09/17  0615 04/10/17  0518 04/10/17  1834   Weight: (!) 392 lb (178 kg) (!) 394 lb (179 kg) (!) 394 lb 10 oz (179 kg)     Body mass index is 52.58 kg/(m^2).    Physical Exam:   GEN: alert, appears stated age and cooperative wearing BiPAP  Body Habitus: morbidly obese  HEENT: Head: Normocephalic, no lesions, without obvious abnormality.  Neck / Thyroid: Supple, no masses, nodes, nodules or enlargement. No arcus senilis, xanthelasma or xanthomas.   JVP: 14 cm of water at 45 degrees HJR: Present    Carotid:  Upstroke: easily palpated bilaterally Volume: Normal.    Carotid Bruit:  None  Subclavian Bruit: Not present.    Chest:  Normal Excursion: Good    I:E: Good  Pulmonary:clear to auscultation, no wheezes, rales or rhonchi, symmetric air entry      Precordium:  No palpable heaves or thrusts. P2 is not palpable.   [place]:  normal size and placement Palpable S4: Not present.   Heart rate: normal  Heart Rhythm: regular     Heart Sounds: S1: normal intensity  S2: increased P2  S3: absent   S4: absent  Opening Snap: absent  A2-OS:  N/A  Pericardial rub: absent    Ejection click: None      Murmurs: " Systolic: none  Diastolic: none  Extremity: 2+ edema  Pulses: Right radial artery has 2+ (normal) pulse and Left radial artery has 2+ (normal) pulse         DATA REVIEWED:    Right heart pressures:  RA: 15 mmHg  RV: 43/9 with a mean right ventricular pressure of 16    PA: 47/21 with a mean PA pressure 32  PCWP:  21 mmHg   DP     Resistance:  SVR: 818 dynes · sec/cm5 PVR: 160 dynes · sec/cm5     Saturations:  PA: 65.3%         Lab Results (last 24 hours)     Procedure Component Value Units Date/Time    POC Glucose Fingerstick [[id number]]  (Abnormal) Collected:  17 1127    Specimen:  Blood Updated:  17 1253     Glucose 205 (H) mg/dL       Sliding Scale AdminMeter: [id number]: [id number] PARTH SUGGS       Hepatitis B Surface Antigen [[id number]]  (Normal) Collected:  17    Specimen:  Blood Updated:  17 1938     Hepatitis B Surface Ag Negative    Hepatitis B Surface Antibody [[id number]]  (Abnormal) Collected:  17    Specimen:  Blood Updated:  17     Hepatitis Bs Ab Index 10.20 (H)     Hep B S Ab Non-Immune (A)    POC Glucose Fingerstick [[id number]]  (Normal) Collected:  17 1824    Specimen:  Blood Updated:  17 2249     Glucose 125 mg/dL       Meter: [id number]: [id number] REGINALD XIAO       POC Glucose Fingerstick [[id number]]  (Normal) Collected:  176    Specimen:  Blood Updated:  17 2250     Glucose 119 mg/dL       Sliding Scale AdminMeter: [id number]: [id number] DEREK WOGN       CBC (No Diff) [[id number]]  (Abnormal) Collected:  17 0246    Specimen:  Blood Updated:  17 0311     WBC 13.93 (H) 10*3/mm3      RBC 3.61 (L) 10*6/mm3      Hemoglobin 8.9 (L) g/dL      Hematocrit 27.5 (L) %      MCV 76.2 (L) fL      MCH 24.7 (L) pg      MCHC 32.4 g/dL      RDW 15.4 (H) %      RDW-SD 43.0 fl      MPV 8.8 fL      Platelets 215 10*3/mm3     Renal Function Panel [[id number]]  (Abnormal) Collected:  17 0246     Specimen:  Blood Updated:  04/12/17 0329     Glucose 178 (H) mg/dL      BUN 50 (H) mg/dL      Creatinine 4.25 (H) mg/dL      Sodium 140 mmol/L      Potassium 3.6 mmol/L      Chloride 102 mmol/L      CO2 22.0 mmol/L      Calcium 8.0 (L) mg/dL      Albumin 3.00 (L) g/dL      Phosphorus 5.9 (H) mg/dL      Anion Gap 16.0 (H) mmol/L      BUN/Creatinine Ratio 11.8     eGFR Non African Amer 14 (L) mL/min/1.73     Protime-INR [[id number]]  (Abnormal) Collected:  04/12/17 0246    Specimen:  Blood Updated:  04/12/17 0335     Protime 16.9 (H) Seconds      INR 1.37 (H)    Narrative:       Therapeutic range for most indications is 2.0-3.0 INR,  or 2.5-3.5 for mechanical heart valves.        Imaging Results (last 24 hours)     Procedure Component Value Units Date/Time    US Venous Doppler Lower Extremity Bilateral (duplex) [[id number]] Collected:  04/06/17 1007     Updated:  04/06/17 1010    Narrative:       Patient Name:  INGRID HARRIS  Patient ID:  [id number]   Ordering:  SHERON KELLER  Attending:  RIC GUILLORY  Referring:  SHERON EKLLER  ------------------------------------------------  Doppler duplex venous examination bilateral lower extremities.  CLINICAL INDICATION: Leg swelling.      COMPARISON: None    FINDINGS:    The common femoral,  femoral, and popliteal veins of the  bilateral     lower extremity are well identified and compress  normally.  Doppler signals are heard either spontaneously or on  distal compression.  No evidence of intraluminal thrombus was  noted.     The visualized posterior calf veins are unremarkable.      Impression:       CONCLUSION:  No evidence of deep venous thrombosis in the common  femoral, superficial femoral veins, or popliteal veins of the  bilateral lower extremities.         Electronically signed by:  Leo Kelly MD  4/6/2017 10:09 AM CDT  Workstation: [id number]    XR Chest 1 View [[id number]] Collected:  04/06/17 2132     Updated:  04/06/17 2137    Narrative:       Exam: AP  portable chest    INDICATION: Dyspnea    COMPARISON: 4/5/2017    FINDINGS: AP portable chest. Heart size upper limits of normal.  Interval increase in the bilateral airspace opacity and  infiltrates. Pulmonary vascularity is mildly prominent. Cannot  exclude a small left pleural effusion.      Impression:       Interval increase in the bilateral airspace  opacity/infiltrates    Electronically signed by:  Vicente Nascimento MD  4/6/2017 9:36 PM CDT  Workstation: [id number]        · Left Ventricle: Estimated EF appears to be in the range of 51 - 55%  · Global left ventricular wall motion appears abnormal. The left ventricular cavity is moderate-to-severely dilated  · Left ventricular diastolic dysfunction is noted (grade II w/high LAP) consistent with pseudonormalization. Elevated left atrial pressure  · Right Ventricle: Normal wall thickness, systolic function and septal motion noted. Right ventricular cavity is mildly dilated.  · The inferior vena cava is dilated. Normal IVC inspiratory collapse of greater than 50% noted.  · Mild mitral valve regurgitation is present.  · Trace to mild tricuspid valve regurgitation is present.  · Calculated right ventricular systolic pressure from tricuspid regurgitation is 66 mmHg  · Severe pulmonary hypertension is present.  · There is no evidence of pericardial effusion.    LE Duplex  FINDINGS:     The common femoral, femoral, and popliteal veins of the  bilateral lower extremity are well identified and compress  normally. Doppler signals are heard either spontaneously or on  distal compression. No evidence of intraluminal thrombus was  noted.      The visualized posterior calf veins are unremarkable.    Assessment/Plan      1. HFpEF. NYHA stage C; FC-III. Today, he remains markedly hypervolemic, but perfused.     -RRT per nephrology  -Hydralazine and Lopressor  -Continue 2 L fluid restriction and low sodium diet    2. PVH with in-proportion DPG. He is WHO-Class-II.   -As  above  -Will need outpatient PFTs and sleep evaluation for GRAHAM    3. NSTEMI.  The patient is chest pain-free and hemodynamically stable.  He is currently tolerating OMT.  Deferred considerations for LHC given his worsening renal function.  -ASA 81mg; Plavix 75mg PO daily; Atorvastatin 80mg  -Lopressor  -Deferred consideration for LHC pending improvement in renal function    Thank you for allowing me to participate in the care of your patient.  Will continue to follow.          This document has been electronically signed by Bharath Dobbins MD PhD on April 12, 2017 7:35 AM

## 2017-04-12 NOTE — CONSULTS
"Adult Nutrition  Assessment    Patient Name:  Blake Chavez  YOB: 1955  MRN: [id number]  Admit Date:  4/5/2017    Assessment Date:  4/12/2017          Reason for Assessment       04/12/17 1713    Reason for Assessment    Reason For Assessment/Visit follow up protocol      04/12/17 1601    Reason for Assessment    Reason For Assessment/Visit follow up protocol                  Anthropometrics       04/12/17 1714    Anthropometrics    Height 184.5 cm (72.64\")    Weight (!)  179 kg (394 lb 10 oz)    Ideal Body Weight (IBW)    Ideal Body Weight (IBW), Male (kg) 83.85    % Ideal Body Weight 213.92    Body Mass Index (BMI)    BMI (kg/m2) 52.69    BMI Grade greater than 40 - obesity grade III            Labs/Tests/Procedures/Meds       04/12/17 1714    Labs/Tests/Procedures/Meds    Labs/Tests Review Glucose    Medication Review Iron;Insulin;Reviewed, pertinent            Physical Findings       04/12/17 1716    Physical Appearance    Skin other (see comments)   bilateral gluteal ulceration - venous              Nutrition Prescription Ordered       04/12/17 1716    Nutrition Prescription PO    Current PO Diet Regular    Common Modifiers Cardiac;Consistent Carbohydrate            Evaluation of Received Nutrient/Fluid Intake       04/12/17 1716    PO Evaluation    Number of Meals 3    % PO Intake 100            Comments:  Pt reports fairly good appetite.  Voiced no food preferences.  Intake 100%- 3x.  Blood glucose is elevated.  Sliding scale novolog prescrbed.  Magic Butt prescribed due to wound.  Pt was receiving dialysis.  Request further diet ed be done later.        Electronically signed by:  Christina Morrell RD  04/12/17 5:18 PM  "

## 2017-04-12 NOTE — PROGRESS NOTES
"[facility]  [address] [place] [address]. [address]  T - [phone]  F - [phone]     Progress Note          PATIENT  DEMOGRAPHICS   PATIENT NAME: Blake Chavez                      PHYSICIAN: Alanna Brand MD  : 1955  MRN: [id number]   LOS: 7 days    Patient Care Team:  Himanshu Tovar MD as PCP - General (Family Medicine)  Subjective   SUBJECTIVE   Soa mildly improved s/p dialysis yesterday.  Currently sitting in chair bedside.        Objective   OBJECTIVE   Vital Signs  Temp:  [97.5 °F (36.4 °C)-98.9 °F (37.2 °C)] 98.6 °F (37 °C)  Heart Rate:  [74-90] 81  Resp:  [16-25] 20  BP: ()/(54-78) 111/55    Flowsheet Rows         First Filed Value    Admission Height  72.99\" (185.4 cm) Documented at 2017 1542    Admission Weight  (!)  386 lb 6.4 oz (175 kg) Documented at 2017 1659           I/O last 3 completed shifts:  In: 2685.8 [P.O.:850; I.V.:120; Blood:965.8; IV Piggyback:750]  Out: 3735 [Urine:735; Other:3000]    PHYSICAL EXAM    Physical Exam   Extremities entry bilaterally decrease breath sounds at bases  Heart regular rate and rhythm no gallop.  Abdomen obese soft nontender distended bowel sounds are present.  Extremities trace edema in both ankles    RESULTS   Results Review:      Results from last 7 days  Lab Units 17  0246 17  0226 04/10/17  0155  17  0600  17  1147   SODIUM mmol/L 140 144 143  < > 148*  < > 149*   SODIUM, ARTERIAL   --   --   --   < >  --   < >  --    POTASSIUM mmol/L 3.6 3.9 4.0  < > 4.4  < > 4.8   CHLORIDE mmol/L 102 110 109  < > 113*  < > 113*   TOTAL CO2 mmol/L 22.0 17.0* 19.0*  < > 20.0*  < > 20.0*   BUN mg/dL 50* 66* 62*  < > 48*  < > 40*   CREATININE mg/dL 4.25* 4.81* 3.89*  < > 3.22*  < > 2.99*   CALCIUM mg/dL 8.0* 8.3* 8.0*  < > 9.0  < > 9.5   BILIRUBIN mg/dL  --   --  0.2  --  0.4  --  0.4   ALK PHOS U/L  --   --  77  --  106  --  120   ALT (SGPT) U/L  --   --  17*  --  12*  --  12*   AST " (SGOT) U/L  --   --  36  --  23  --  19   GLUCOSE mg/dL 178* 172* 209*  < > 164*  < > 180*   GLUCOSE, ARTERIAL   --   --   --   < >  --   < >  --    < > = values in this interval not displayed.    Estimated Creatinine Clearance: 29.9 mL/min (by C-G formula based on Cr of 4.25).      Results from last 7 days  Lab Units 04/12/17  0246 04/11/17  0226 04/10/17  0155   PHOSPHORUS mg/dL 5.9* 6.8* 5.6*               Results from last 7 days  Lab Units 04/12/17  0246 04/11/17  0226 04/10/17  0155 04/09/17  0300 04/08/17  0216   WBC 10*3/mm3 13.93* 14.36* 14.76* 15.39* 11.96*   HEMOGLOBIN g/dL 8.9* 7.8* 7.2* 7.7* 7.2*   PLATELETS 10*3/mm3 215 222 199 236 213         Results from last 7 days  Lab Units 04/12/17  0246 04/05/17  1712   INR  1.37* 1.28*         Imaging Results (last 24 hours)     Imaging Results (last 24 hours)     Procedure Component Value Units Date/Time    CT Abdomen Pelvis Without Contrast [[id number]] Collected:  04/10/17 2120     Updated:  04/10/17 2133    Narrative:         [facility]    Patient Name: INGRID HARRIS    ORDERING: ROSA MARIA HOLT    ATTENDING: RIC GUILLORY     REFERRING: ROSA MARIA HOLT  -----------------------    PROCEDURE: CT abdomen and pelvis without contrast on 4/10/2017    CLINICAL INDICATION:  Bilateral ureteral stones, acute renal  insufficiency, right hydronephrosis    TECHNIQUE: Multiple axial images are obtained throughout the  abdomen and pelvis without the administration of contrast. This  study was performed with techniques to keep radiation doses as  low as reasonably achievable, (ALARA).   Total DLP is 5847.1 mGy*cm.    COMPARISON: 2/10/2017     FINDINGS:     ABDOMEN: There has been development of moderate size bilateral  pleural effusions with adjacent bibasilar atelectasis and likely  component of pneumonia in the right lower lobe. Right ureteral  stent is noted extending from the upper pole collecting system of  the right kidney into the right aspect  of the bladder and appears  in good position. There remains a large right renal pelvis stone  measuring up to 2.4 cm in greatest diameter with other smaller  nonobstructing right renal stones. There are no ureteral stones  and no hydronephrosis. No left-sided renal or ureteral stone is  now noted. Stable left adrenal nodule has Hounsfield unit  measurements consistent with an adenoma. The unenhanced solid  abdominal organs are otherwise unremarkable. There is no  abdominal adenopathy. There is no free fluid or free air within  the abdomen. The abdominal portion of the GI tract is  unremarkable.    Pelvis: There is a moderate-sized umbilical hernia containing  only fat. Meier catheter extends into the decompressed bladder.  There is no free fluid in the pelvis. There is no pelvic  adenopathy. There is mild diverticulosis. The pelvic portion of  the GI tract is otherwise unremarkable. Degenerative changes are  noted in the spine and hips.      Impression:       CONCLUSION:   1. Bilateral pleural effusions with bibasilar atelectasis and  likely component of pneumonia at least in the right lower lobe.  2. Right ureteral stent in good position with continued right  nephrolithiasis without hydronephrosis or ureteral stone.  3. Moderate-sized umbilical hernia containing fat.  4. Mild diverticulosis.    Electronically signed by:  Benjamín Roy  4/10/2017 9:32 PM  CDT Workstation: [id number]                 MEDICATIONS      atorvastatin 80 mg Oral Nightly   clopidogrel 75 mg Oral Daily   ferrous sulfate 324 mg Oral BID With Meals   gabapentin 100 mg Oral Nightly   heparin (porcine) 5,000 Units Subcutaneous Q8H   hydrALAZINE 25 mg Oral BID   insulin aspart 0-7 Units Subcutaneous 4x Daily AC & at Bedtime   Linezolid 600 mg Intravenous Q12H   magic butt ointment  Topical BID   metoprolol tartrate 25 mg Oral Q12H   pantoprazole 40 mg Oral Daily   piperacillin-tazobactam 2.25 g Intravenous Q6H       dextrose 5 % and  sodium chloride 0.45 % 30 mL/hr       Assessment/Plan   ASSESSMENT / PLAN    Principal Problem:    NSTEMI (non-ST elevated myocardial infarction)  Active Problems:    Type 2 diabetes mellitus    Osteoarthritis of multiple joints    Essential hypertension    Bacterial pneumonia    Morbid obesity with BMI of 50.0-59.9, adult    Acute cystitis    Heart failure with preserved ejection fraction, borderline, class III    Pulmonary hypertension    Anemia    Assessment  1.  Acute kidney injury/ATN : non oliguric.  S/p dialysis yesterday, renal function responding, Cr 3.61 and BUN 50 today from 4.81 and 66 yesterday, currently non-oliguric. on ckd 3/4 prior cr came down to 2.6.  2.  Non-ST elevation myocardial infarction.  3.  Anemia. Hgb 8.9 s/p 2 Units 4/11/17  4.  HFpEF, FC-III, Stage C.  5.  Nephrolithiasis s/p cysto, bilateral retrograde, right ureteroscopy, lithotripsy with stent placement, bladder stone lithotripsy 3/29/17. Repeat CT scan showed large right renal pelvis stone, smaller stones but no hydro. Urology says no urological intervention at this time, until medically stable.  Plan  1.  Appears hypervolemic on exam and cxr. RHC showed elevated PCWP (21 mm Hg). Clinically improving with HD.  HD again today and then next Friday. We will attempt diuretics tomorrow.            This document has been electronically signed by Gentry Subramanian MD on April 12, 2017 9:10 AM      I discussed the patients findings and my recommendations with patient and family   I have reviewed the case with the resident. I have also examined and seen the patient. I agree with the assessment and plan as documented in the resident's note.    Alanna Brand MD  04/12/17  9:10 AM

## 2017-04-12 NOTE — PLAN OF CARE
Problem: Patient Care Overview (Adult)  Goal: Plan of Care Review  Outcome: Ongoing (interventions implemented as appropriate)    04/11/17 1935   Coping/Psychosocial Response Interventions   Plan Of Care Reviewed With patient   Patient Care Overview   Progress no change   Outcome Evaluation   Outcome Summary/Follow up Plan continues having anxiety and will request CPAP while sats are WNL, dialysis cath placed with no issues, receiving first dialysis treatment, no pain stated, vitals WNL       Goal: Adult Individualization and Mutuality  Outcome: Ongoing (interventions implemented as appropriate)  Goal: Discharge Needs Assessment  Outcome: Ongoing (interventions implemented as appropriate)    Problem: Fall Risk (Adult)  Goal: Identify Related Risk Factors and Signs and Symptoms  Outcome: Outcome(s) achieved Date Met:  04/11/17  Goal: Absence of Falls  Outcome: Ongoing (interventions implemented as appropriate)    Problem: Respiratory Insufficiency (Adult)  Goal: Identify Related Risk Factors and Signs and Symptoms  Outcome: Outcome(s) achieved Date Met:  04/11/17  Goal: Acid/Base Balance  Outcome: Ongoing (interventions implemented as appropriate)  Goal: Effective Ventilation  Outcome: Ongoing (interventions implemented as appropriate)

## 2017-04-12 NOTE — PROGRESS NOTES
CRITICAL CARE PROGRESS NOTE  Abby Fu MD    [facility] CRITICAL [facility]ARE  4/12/2017        Blake Chavez  [id number]  1955  61 y.o. male            LOS: 7 days   Himanshu Tovar MD    Chief Complaint/Reason for visit: F/u acute hypoxic respiratory failure, HAP    Subjective     Interval History:   History taken from: patient/ chart    HD yesterday with 3L removed. Breathing some better. Still with cough but decreased. OOB to chair this AM.    Review of Systems:   Review of Systems   Constitutional: Negative for fever.   Respiratory: Positive for cough and shortness of breath. Negative for wheezing.    Cardiovascular: Positive for leg swelling. Negative for chest pain and palpitations.   Gastrointestinal: Negative for abdominal pain.     All systems were reviewed and negative except as noted above in the HPI.    Medical history, surgical history, social history, family history reviewed    Objective     Intake/Output:    Intake/Output Summary (Last 24 hours) at 04/12/17 0746  Last data filed at 04/12/17 0530   Gross per 24 hour   Intake          1675.83 ml   Output             3415 ml   Net         -1739.17 ml       Nutrition: PO    Infusions:    dextrose 5 % and sodium chloride 0.45 % 30 mL/hr       Respiratory:       Vital Sign Min/Max for last 24 hours:  Temp  Min: 97.5 °F (36.4 °C)  Max: 98.9 °F (37.2 °C)   BP  Min: 94/54  Max: 148/65   Pulse  Min: 74  Max: 99   Resp  Min: 16  Max: 25   SpO2  Min: 93 %  Max: 100 %   Flow (L/min)  Min: 5  Max: 5   No Data Recorded     Physical Exam:  98.2 °F (36.8 °C) (Temporal Artery ) 77 101/62 20 96% (!) 394 lb 10 oz (179 kg) Body mass index is 52.58 kg/(m^2).  Physical Exam   Constitutional: He is oriented to person, place, and time. Vital signs are normal. He appears well-developed and well-nourished.   morbidly obese   HENT:   Head: Normocephalic and atraumatic.   Nose: Nose normal.   Mouth/Throat: Mucous membranes are normal.   Eyes: EOM  are normal.   Neck:   Large neck circumference   Cardiovascular: Normal rate, regular rhythm and normal heart sounds.  Exam reveals no gallop.    No murmur heard.  Pulmonary/Chest: Effort normal. No accessory muscle usage. No respiratory distress. He has no wheezes. He has no rhonchi.   Abdominal: Soft. Normal appearance and bowel sounds are normal. There is no tenderness.   Morbidly obese   Neurological: He is alert and oriented to person, place, and time.   Skin: Skin is warm and dry. No cyanosis. Nails show no clubbing.   Psychiatric: He has a normal mood and affect. His behavior is normal. Judgment normal. Cognition and memory are normal.       Central Lines/PICC: absent     Results Review:  I personally reviewed the patient's new clinical results.   Lab Results (last 24 hours)     Procedure Component Value Units Date/Time    POC Glucose Fingerstick [[id number]]  (Abnormal) Collected:  04/11/17 1127    Specimen:  Blood Updated:  04/11/17 1253     Glucose 205 (H) mg/dL       Sliding Scale AdminMeter: [id number]: [id number] PARTH IFEANYI       Hepatitis B Surface Antigen [[id number]]  (Normal) Collected:  04/11/17 1814    Specimen:  Blood Updated:  04/11/17 1938     Hepatitis B Surface Ag Negative    Hepatitis B Surface Antibody [[id number]]  (Abnormal) Collected:  04/11/17 1814    Specimen:  Blood Updated:  04/11/17 1956     Hepatitis Bs Ab Index 10.20 (H)     Hep B S Ab Non-Immune (A)    POC Glucose Fingerstick [[id number]]  (Normal) Collected:  04/11/17 1824    Specimen:  Blood Updated:  04/11/17 2249     Glucose 125 mg/dL       Meter: [id number]: [id number] REGINALD XIAO       POC Glucose Fingerstick [[id number]]  (Normal) Collected:  04/11/17 2036    Specimen:  Blood Updated:  04/11/17 2250     Glucose 119 mg/dL       Sliding Scale AdminMeter: [id number]: [id number] DEREK WONG       CBC (No Diff) [[id number]]  (Abnormal) Collected:  04/12/17 0246    Specimen:  Blood Updated:  04/12/17 0311      WBC 13.93 (H) 10*3/mm3      RBC 3.61 (L) 10*6/mm3      Hemoglobin 8.9 (L) g/dL      Hematocrit 27.5 (L) %      MCV 76.2 (L) fL      MCH 24.7 (L) pg      MCHC 32.4 g/dL      RDW 15.4 (H) %      RDW-SD 43.0 fl      MPV 8.8 fL      Platelets 215 10*3/mm3     Renal Function Panel [[id number]]  (Abnormal) Collected:  04/12/17 0246    Specimen:  Blood Updated:  04/12/17 0329     Glucose 178 (H) mg/dL      BUN 50 (H) mg/dL      Creatinine 4.25 (H) mg/dL      Sodium 140 mmol/L      Potassium 3.6 mmol/L      Chloride 102 mmol/L      CO2 22.0 mmol/L      Calcium 8.0 (L) mg/dL      Albumin 3.00 (L) g/dL      Phosphorus 5.9 (H) mg/dL      Anion Gap 16.0 (H) mmol/L      BUN/Creatinine Ratio 11.8     eGFR Non African Amer 14 (L) mL/min/1.73     Protime-INR [[id number]]  (Abnormal) Collected:  04/12/17 0246    Specimen:  Blood Updated:  04/12/17 0335     Protime 16.9 (H) Seconds      INR 1.37 (H)    Narrative:       Therapeutic range for most indications is 2.0-3.0 INR,  or 2.5-3.5 for mechanical heart valves.          Results from last 7 days  Lab Units 04/09/17  0435   PH, ARTERIAL pH units 7.341*   PO2 ART mm Hg 95.5   PCO2, ARTERIAL mm Hg 37.1   HCO3 ART mmol/L 19.6*     Lab Results   Component Value Date    BLOODCX No growth at 5 days 04/05/2017     Lab Results   Component Value Date    URINECX >100,000 CFU/mL Enterococcus faecalis (A) 04/07/2017       I independently reviewed the patient's new imaging, including images and reports.  Imaging Results (last 24 hours)     Procedure Component Value Units Date/Time    XR Chest 1 View [[id number]] Collected:  04/10/17 0635     Updated:  04/10/17 0637    Narrative:       Exam: AP portable chest    INDICATION: Respiratory failure    COMPARISON: 4/8/2017    FINDINGS: AP portable chest. Heart is enlarged. The pulmonary  vascularity is prominent. No change in bilateral patchy airspace  opacity. Cannot exclude small effusions.      Impression:       No significant interval  change.    Electronically signed by:  Vicente Nascimento MD  4/10/2017 6:36 AM  CDT Workstation: [id number]            All medications reviewed.     atorvastatin 80 mg Oral Nightly   clopidogrel 75 mg Oral Daily   ferrous sulfate 324 mg Oral BID With Meals   gabapentin 100 mg Oral Nightly   heparin (porcine) 5,000 Units Subcutaneous Q8H   hydrALAZINE 25 mg Oral BID   insulin aspart 0-7 Units Subcutaneous 4x Daily AC & at Bedtime   Linezolid 600 mg Intravenous Q12H   magic butt ointment  Topical BID   metoprolol tartrate 25 mg Oral Q12H   pantoprazole 40 mg Oral Daily   piperacillin-tazobactam 2.25 g Intravenous Q6H         Assessment/Plan     ASSESSMENT:  # Acute hypoxemic respiratory failure- likely multifactorial from HAP and volume overload  # Probable HAP  # Pulmonary edema/ volume overload  # PVH  # NSTEMI- troponin trending down  # Morbid obesity  # Probable GRAHAM/ OHS  # HFpEF  # BRANDO  # DM  # Anemia- likely due to blood draws in setting of kidney dz  # UTI      PLAN:  -Wean O2 to keep sats >90%.   -CXR in AM  -CPAP QHS and prn as tolerated  -Cont empiric linezolid/ zosyn. Complete 8d total empiric abx course for HAP.  -Sputum cx with normal gricelda  -Duonebs prn  -OOB to chair to optimize respiratory mechanics  -Pulmonary toilet  -Cardiac management per Dr. Dobbins  -Continue HD for UF  -Needs sleep study as outpt     VTE Prophylaxis: Heparin TID  Stress Ulcer Prophylaxis: Protonix    Stable for transfer to floor per primary team.    Critical Care Time Spent: 22 minutes  I personally provided care to this critically ill patient as documented above.  Critical care time does not include time spent on separately billed procedures.  None of my critical care time was concurrent with other critical care providers.         This document has been electronically signed by Abby Fu MD on April 12, 2017 7:46 AM      [phone]    EMR Dragon/Transcription disclaimer:     Much of this encounter note is an  electronic transcription/translation of spoken language to printed text. The electronic translation of spoken language may permit erroneous, or at times, nonsensical words or phrases to be inadvertently transcribed; Although I have reviewed the note for such errors, some may still exist.

## 2017-04-12 NOTE — PLAN OF CARE
Problem: Patient Care Overview (Adult)  Goal: Plan of Care Review  Outcome: Ongoing (interventions implemented as appropriate)    04/12/17 6788   Coping/Psychosocial Response Interventions   Plan Of Care Reviewed With patient   Patient Care Overview   Progress improving   Outcome Evaluation   Outcome Summary/Follow up Plan pt able to tolerate HOB flat for turns and nursing care w/out use of cpap; dialized today with 2500ml taken off; hydralazine held today for low BP       Goal: Adult Individualization and Mutuality  Outcome: Ongoing (interventions implemented as appropriate)  Goal: Discharge Needs Assessment  Outcome: Ongoing (interventions implemented as appropriate)    Problem: Fall Risk (Adult)  Goal: Absence of Falls  Outcome: Ongoing (interventions implemented as appropriate)    Problem: Respiratory Insufficiency (Adult)  Goal: Acid/Base Balance  Outcome: Ongoing (interventions implemented as appropriate)  Goal: Effective Ventilation  Outcome: Ongoing (interventions implemented as appropriate)

## 2017-04-12 NOTE — PLAN OF CARE
Problem: Patient Care Overview (Adult)  Goal: Plan of Care Review  Outcome: Ongoing (interventions implemented as appropriate)  Will provide further diet ed as appropriate.    04/12/17 1722   Coping/Psychosocial Response Interventions   Plan Of Care Reviewed With patient   Patient Care Overview   Progress improving   Outcome Evaluation   Outcome Summary/Follow up Plan 100% - 3x

## 2017-04-12 NOTE — PROGRESS NOTES
[address] [address]  T - [id number]         PROGRESS NOTE         SUBJECTIVE:   Patient Care Team:  Himanshu Tovar MD as PCP - General (Family Medicine)    Chief Complaint:   CC:dyspnea    Subjective     Patient is 61 y.o. male presents with dyspnea and edema.He had a good response to dialysis yesterday.      ROS/HISTORY/ CURRENT MEDICATIONS/OBJECTIVE/VS/PE:   Review of Systems:   Review of Systems   Constitutional: Negative for fever.   Respiratory: Negative for chest tightness.    Cardiovascular: Positive for leg swelling. Negative for chest pain.       History:     Past Medical History:   Diagnosis Date   • Acute cystitis    • BPH (benign prostatic hypertrophy)    • Calculus of kidney and ureter     recently passed      • Chest pain    • Crohn's disease    • Edema     unspecified - bilateral lower extremities     • Essential hypertension    • Hip pain    • Hypertensive disorder    • Knee pain    • Morbid obesity    • Nausea    • Nuclear senile cataract    • Osteoarthritis of multiple joints    • Paraumbilical hernia    • Right hand fracture     as a teen   • Severe concentric left ventricular hypertrophy    • Type 2 diabetes mellitus      no BDR    • Type 2 diabetes mellitus      no BDR      Past Surgical History:   Procedure Laterality Date   • CARDIAC CATHETERIZATION N/A 4/10/2017    Procedure: Right Heart Cath;  Surgeon: Bharath Dobbins MD PhD;  Location: [facility] CATH INVASIVE LOCATION;  Service:    • COLONOSCOPY  ; 11/03/0216    Diverticulosis in sigmoid colon. 1 polyp in sigmoid colon; removed by cold biopsy polypectomy. Hemorrhoids found   • CYSTOSCOPY, URETEROSCOPY, RETROGRADE PYELOGRAM, STENT INSERTION Bilateral 3/29/2017    Procedure: CYSTOSCOPY, BILATERAL RETROGRADE, URETEROSCOPY, LASER LITHOTRIPSY, STENT PLACEMENT; LASER LITHOTRIPSY BLADDER CALCULI;  Surgeon: Blake Lopez MD;  Location: [facility] OR;  Service:    • HYDROCELE EXCISION /  REPAIR     • INJECTION OF MEDICATION  04/18/2016    Kenalog   • UPPER GASTROINTESTINAL ENDOSCOPY  11/03/2016     Family History   Problem Relation Age of Onset   • Cancer Other    • Diabetes Other    • Stroke Other    • Other Other      Colon problems    • Diabetes Mother    • Heart failure Mother    • Colon cancer Father    • Breast cancer Paternal Grandmother      Social History   Substance Use Topics   • Smoking status: Never Smoker   • Smokeless tobacco: Never Used   • Alcohol use No     Prescriptions Prior to Admission   Medication Sig Dispense Refill Last Dose   • diltiaZEM (TIAZAC) 360 MG 24 hr capsule TAKE 1 CAPSULE EVERY DAY 30 capsule 5 3/29/2017 at 1000   • FE BISGLY-FE [id number] PO Take  by mouth Daily.   3/28/2017 at 2200   • gabapentin (NEURONTIN) 100 MG capsule TAKE 1 CAPSULE AT BEDTIME FOR FOOT PAIN 30 capsule 5 3/28/2017 at 2200   • Glucosamine 500 MG capsule Take 500 mg by mouth 2 (Two) Times a Day.   4/5/2017 at Unknown time   • glyBURIDE (DIAbeta) 5 MG tablet TAKE 2 TABLETS TWICE DAILY BEFORE MEALS (Patient taking differently: TAKE 2 TABLETS BID) 120 tablet 5 3/28/2017 at 2200   • hydrALAZINE (APRESOLINE) 25 MG tablet Take 1 tablet by mouth 3 (Three) Times a Day. (Patient taking differently: Take 25 mg by mouth 2 (Two) Times a Day.) 90 tablet 3 3/29/2017 at 1000   • lisinopril (PRINIVIL,ZESTRIL) 20 MG tablet TAKE 1 TABLET BY MOUTH EVERY DAY FOR BLOOD PRESSURE 30 tablet 5 3/28/2017 at 1000   • nateglinide (STARLIX) 120 MG tablet TAKE 1 TABLET BY MOUTH BEFORE MEALS (Patient taking differently: daily) 90 tablet 3 3/28/2017 at 1000   • Omega-3 Fatty Acids (FISH OIL) 1000 MG capsule capsule Take 1,000 mg by mouth Daily With Breakfast.   3/28/2017 at 2200   • ONE TOUCH ULTRA TEST test strip USE THREE TIMES DAILY 100 each 11 Taking   • POLY-IRON 150 150 MG capsule TAKE 1 CAPSULE TWICE DAILY 60 capsule 3 3/28/2017 at 2200   • pravastatin (PRAVACHOL) 40 MG tablet Take 1 tablet by mouth Daily. 31  tablet 6 3/28/2017 at 1000   • promethazine (PHENERGAN) 12.5 MG tablet Take 1 tablet by mouth Every 6 (Six) Hours As Needed (prn). 30 tablet 1 More than a month at Unknown time   • SITagliptin (JANUVIA) 100 MG tablet Take 1 tablet by mouth Daily. (Patient taking differently: Take 50 mg by mouth Daily.) 30 tablet 5 3/28/2017 at 1000   • VITAMIN D, ERGOCALCIFEROL, PO Take  by mouth Daily.   3/28/2017 at 1000   • amoxicillin-clavulanate (AUGMENTIN) 875-125 MG per tablet Take 1 tablet by mouth 2 (Two) Times a Day for 10 days. 20 tablet 0    • [] doxycycline (VIBRAMYCIN) 100 MG capsule Take 1 capsule by mouth Daily for 7 days. 7 capsule 0    • HYDROcodone-acetaminophen (NORCO)  MG per tablet Take 1 tablet by mouth Every 6 (Six) Hours As Needed for Moderate Pain (4-6).      • omeprazole (priLOSEC) 40 MG capsule Take 40 mg by mouth Daily.   3/29/2017 at 1000   • oxyCODONE-acetaminophen (PERCOCET) 7.5-325 MG per tablet Take 1-2 tablets by mouth Every 4 (Four) Hours As Needed (Pain). 15 tablet 0    • pantoprazole (PROTONIX) 40 MG EC tablet Take 40 mg by mouth Daily.   not started     Allergies:  Review of patient's allergies indicates no known allergies.    Current Medications:     Current Facility-Administered Medications   Medication Dose Route Frequency Provider Last Rate Last Dose   • albumin human 25 % IV SOLN 12.5 g  12.5 g Intravenous PRN Alanna Brand MD       • albumin human 25 % IV SOLN 12.5 g  12.5 g Intravenous PRN Alanna Brand MD       • atorvastatin (LIPITOR) tablet 80 mg  80 mg Oral Nightly Bharath Dobbins MD PhD   80 mg at 17   • clopidogrel (PLAVIX) tablet 75 mg  75 mg Oral Daily Bharath Dobbins MD PhD   75 mg at 17 0844   • dextrose (D50W) solution 25 g  25 g Intravenous Q15 Min PRN Hayley Valderrama MD       • dextrose (GLUTOSE) oral gel 15 g  15 g Oral Q15 Min PRN Hayley Valderrama MD       • dextrose 5 % and sodium chloride 0.45 % infusion  30 mL/hr Intravenous  Continuous PRN Nestor Green MD       • ferrous sulfate EC tablet 324 mg  324 mg Oral BID With Meals Bharath Dobbins MD PhD   324 mg at 04/12/17 0843   • gabapentin (NEURONTIN) capsule 100 mg  100 mg Oral Nightly Hayley Valderrama MD   100 mg at 04/11/17 2031   • glucagon (human recombinant) (GLUCAGEN DIAGNOSTIC) injection 1 mg  1 mg Subcutaneous Q15 Min PRN Hayley Valderrama MD       • heparin (porcine) 5000 UNIT/ML injection 5,000 Units  5,000 Units Subcutaneous Q8H Bharath Dobbins MD PhD   5,000 Units at 04/12/17 0530   • heparin (porcine) injection 2,000 Units  2,000 Units Intravenous PRN Alanna Brand MD       • heparin (porcine) injection 2,000 Units  2,000 Units Intracatheter PRN Alanna rBand MD       • hydrALAZINE (APRESOLINE) tablet 25 mg  25 mg Oral BID Hayley Valderrama MD   Stopped at 04/11/17 1830   • HYDROcodone-acetaminophen (NORCO)  MG per tablet 1 tablet  1 tablet Oral Q6H PRN Hayley Valderrama MD   1 tablet at 04/12/17 0843   • insulin aspart (novoLOG) injection 0-7 Units  0-7 Units Subcutaneous 4x Daily AC & at Bedtime Hayley Valderrama MD   2 Units at 04/12/17 0834   • ipratropium-albuterol (DUO-NEB) nebulizer solution 3 mL  3 mL Nebulization Q4H PRN Abby Fu MD       • lidocaine PF 1% (XYLOCAINE) injection 0.1 mL  0.1 mL Intradermal Once PRN Nestor Green MD       • Linezolid (ZYVOX) 600 mg 300 mL  600 mg Intravenous Q12H Javed Childers  mL/hr at 04/11/17 2030 600 mg at 04/11/17 2030   • magic butt ointment   Topical BID Hayley Valderrama MD       • metoprolol tartrate (LOPRESSOR) tablet 25 mg  25 mg Oral Q12H Bharath Dobbins MD PhD   25 mg at 04/12/17 0843   • pantoprazole (PROTONIX) EC tablet 40 mg  40 mg Oral Daily Hayley Valderrama MD   40 mg at 04/12/17 0843   • piperacillin-tazobactam (ZOSYN) in iso-osmotic dextrose IVPB 2.25 g (premix)  2.25 g Intravenous Q6H Alanna Brand MD 0 mL/hr at 04/10/17 0652 2.25 g at 04/12/17 0530   • pneumococcal  polysaccharide 23-valent (PNEUMOVAX-23) vaccine 0.5 mL  0.5 mL Intramuscular During Hospitalization Javed Childers DO       • sodium chloride (OCEAN) nasal spray 1 spray  1 spray Each Nare PRN Abby Fu MD       • sodium chloride 0.9 % bolus 1,000 mL  1,000 mL Intravenous PRN Alanna Brand MD       • sodium chloride 0.9 % flush 1-10 mL  1-10 mL Intravenous PRN Hayley Valderrama MD   10 mL at 04/10/17 0824   • sodium chloride 0.9 % flush 1-10 mL  1-10 mL Intravenous PRN Bharath Dobbins MD PhD       • sodium chloride 0.9 % flush 1-10 mL  1-10 mL Intravenous PRN Nestor Green MD           Objective     Physical Exam:   Temp:  [97.5 °F (36.4 °C)-98.9 °F (37.2 °C)] 98.6 °F (37 °C)  Heart Rate:  [74-90] 81  Resp:  [16-25] 20  BP: ()/(54-78) 111/55    Physical Exam   Constitutional: He appears well-developed and well-nourished.   HENT:   Head: Normocephalic and atraumatic.   Cardiovascular: Normal rate, regular rhythm and normal heart sounds.  Exam reveals no gallop and no friction rub.    No murmur heard.  Pulmonary/Chest: Effort normal and breath sounds normal. No respiratory distress. He has no wheezes. He has no rales.   Abdominal: Soft. Bowel sounds are normal. He exhibits no distension. There is no tenderness.   Skin: Skin is warm and dry.   Vitals reviewed.           Results Review:   Lab Results   Component Value Date    GLUCOSE 178 (H) 04/12/2017    BUN 50 (H) 04/12/2017    CREATININE 4.25 (H) 04/12/2017    EGFRIFNONA 14 (L) 04/12/2017    BCR 11.8 04/12/2017    CO2 22.0 04/12/2017    CALCIUM 8.0 (L) 04/12/2017    ALBUMIN 3.00 (L) 04/12/2017    LABIL2 1.0 (L) 04/10/2017    AST 36 04/10/2017    ALT 17 (L) 04/10/2017         WBC WBC   Date Value Ref Range Status   04/12/2017 13.93 (H) 3.20 - 9.80 10*3/mm3 Final   04/11/2017 14.36 (H) 3.20 - 9.80 10*3/mm3 Final   04/10/2017 14.76 (H) 3.20 - 9.80 10*3/mm3 Final      HGB Hemoglobin   Date Value Ref Range Status   04/12/2017 8.9 (L) 13.7 -  17.3 g/dL Final   04/11/2017 7.8 (L) 13.7 - 17.3 g/dL Final   04/10/2017 7.2 (L) 13.7 - 17.3 g/dL Final      HCT Hematocrit   Date Value Ref Range Status   04/12/2017 27.5 (L) 39.0 - 49.0 % Final   04/11/2017 24.8 (L) 39.0 - 49.0 % Final   04/10/2017 23.5 (L) 39.0 - 49.0 % Final      Platlets Platelets   Date Value Ref Range Status   04/12/2017 215 150 - 450 10*3/mm3 Final   04/11/2017 222 150 - 450 10*3/mm3 Final   04/10/2017 199 150 - 450 10*3/mm3 Final          Imaging Results (last 24 hours)     Procedure Component Value Units Date/Time    US Guided Vascular Access [[id number]] Resulted:  04/11/17 1700     Updated:  04/11/17 1700    Narrative:       This procedure was auto-finalized with no dictation required.    IR insert non-tunneled CVC 5+ [[id number]] Updated:  04/12/17 0819           I reviewed the patient's new clinical results.  I reviewed the patient's new imaging results and agree with the interpretation.     ASSESSMENT/PLAN:   Assessment/Plan   Principal Problem:    NSTEMI (non-ST elevated myocardial infarction)  Active Problems:    Type 2 diabetes mellitus    Osteoarthritis of multiple joints    Essential hypertension    Bacterial pneumonia    Morbid obesity with BMI of 50.0-59.9, adult    Acute cystitis    Heart failure with preserved ejection fraction, borderline, class III    Pulmonary hypertension    Anemia      Continue with current treatment.  Will follow renal function.  I discussed the patients findings and my recommendations with patient.      Himanshu Tovar MD  04/12/17  9:27 AM

## 2017-04-13 ENCOUNTER — APPOINTMENT (OUTPATIENT)
Dept: GENERAL RADIOLOGY | Facility: HOSPITAL | Age: 62
End: 2017-04-13

## 2017-04-13 LAB
ALBUMIN SERPL-MCNC: 3.4 G/DL (ref 3.4–4.8)
ALBUMIN/GLOB SERPL: 1.1 G/DL (ref 1.1–1.8)
ALP SERPL-CCNC: 97 U/L (ref 38–126)
ALT SERPL W P-5'-P-CCNC: 21 U/L (ref 21–72)
ANION GAP SERPL CALCULATED.3IONS-SCNC: 15 MMOL/L (ref 5–15)
AST SERPL-CCNC: 40 U/L (ref 17–59)
BASOPHILS # BLD AUTO: 0.04 10*3/MM3 (ref 0–0.2)
BASOPHILS NFR BLD AUTO: 0.3 % (ref 0–2)
BILIRUB SERPL-MCNC: 0.4 MG/DL (ref 0.2–1.3)
BUN BLD-MCNC: 53 MG/DL (ref 7–21)
BUN/CREAT SERPL: 10.4 (ref 7–25)
CALCIUM SPEC-SCNC: 8.8 MG/DL (ref 8.4–10.2)
CHLORIDE SERPL-SCNC: 98 MMOL/L (ref 95–110)
CO2 SERPL-SCNC: 24 MMOL/L (ref 22–31)
CREAT BLD-MCNC: 5.09 MG/DL (ref 0.7–1.3)
DEPRECATED RDW RBC AUTO: 43.9 FL (ref 35.1–43.9)
EOSINOPHIL # BLD AUTO: 0.4 10*3/MM3 (ref 0–0.7)
EOSINOPHIL NFR BLD AUTO: 2.7 % (ref 0–7)
ERYTHROCYTE [DISTWIDTH] IN BLOOD BY AUTOMATED COUNT: 15.6 % (ref 11.5–14.5)
FERRITIN SERPL-MCNC: 121 NG/ML (ref 17.9–464)
GFR SERPL CREATININE-BSD FRML MDRD: 12 ML/MIN/1.73 (ref 49–113)
GLOBULIN UR ELPH-MCNC: 3.2 GM/DL (ref 2.3–3.5)
GLUCOSE BLD-MCNC: 153 MG/DL (ref 60–100)
GLUCOSE BLDC GLUCOMTR-MCNC: 159 MG/DL (ref 70–130)
GLUCOSE BLDC GLUCOMTR-MCNC: 162 MG/DL (ref 70–130)
GLUCOSE BLDC GLUCOMTR-MCNC: 169 MG/DL (ref 70–130)
GLUCOSE BLDC GLUCOMTR-MCNC: 173 MG/DL (ref 70–130)
HCT VFR BLD AUTO: 31.8 % (ref 39–49)
HGB BLD-MCNC: 10.2 G/DL (ref 13.7–17.3)
IMM GRANULOCYTES # BLD: 0.1 10*3/MM3 (ref 0–0.02)
IMM GRANULOCYTES NFR BLD: 0.7 % (ref 0–0.5)
IRON 24H UR-MRATE: <10 MCG/DL (ref 49–181)
IRON SATN MFR SERPL: ABNORMAL % (ref 20–55)
LYMPHOCYTES # BLD AUTO: 1.16 10*3/MM3 (ref 0.6–4.2)
LYMPHOCYTES NFR BLD AUTO: 7.7 % (ref 10–50)
MCH RBC QN AUTO: 24.7 PG (ref 26.5–34)
MCHC RBC AUTO-ENTMCNC: 32.1 G/DL (ref 31.5–36.3)
MCV RBC AUTO: 77 FL (ref 80–98)
MONOCYTES # BLD AUTO: 0.79 10*3/MM3 (ref 0–0.9)
MONOCYTES NFR BLD AUTO: 5.3 % (ref 0–12)
NEUTROPHILS # BLD AUTO: 12.5 10*3/MM3 (ref 2–8.6)
NEUTROPHILS NFR BLD AUTO: 83.3 % (ref 37–80)
PLATELET # BLD AUTO: 252 10*3/MM3 (ref 150–450)
PMV BLD AUTO: 8.4 FL (ref 8–12)
POTASSIUM BLD-SCNC: 4.1 MMOL/L (ref 3.5–5.1)
PROT SERPL-MCNC: 6.6 G/DL (ref 6.3–8.6)
RBC # BLD AUTO: 4.13 10*6/MM3 (ref 4.37–5.74)
SODIUM BLD-SCNC: 137 MMOL/L (ref 137–145)
TIBC SERPL-MCNC: 188 MCG/DL (ref 261–462)
WBC NRBC COR # BLD: 14.99 10*3/MM3 (ref 3.2–9.8)

## 2017-04-13 PROCEDURE — 25010000002 PIPERACILLIN SOD-TAZOBACTAM PER 1 G: Performed by: INTERNAL MEDICINE

## 2017-04-13 PROCEDURE — 97162 PT EVAL MOD COMPLEX 30 MIN: CPT

## 2017-04-13 PROCEDURE — 99232 SBSQ HOSP IP/OBS MODERATE 35: CPT | Performed by: INTERNAL MEDICINE

## 2017-04-13 PROCEDURE — 85025 COMPLETE CBC W/AUTO DIFF WBC: CPT | Performed by: FAMILY MEDICINE

## 2017-04-13 PROCEDURE — 25010000002 HEPARIN (PORCINE) PER 1000 UNITS: Performed by: INTERNAL MEDICINE

## 2017-04-13 PROCEDURE — 97530 THERAPEUTIC ACTIVITIES: CPT

## 2017-04-13 PROCEDURE — 99232 SBSQ HOSP IP/OBS MODERATE 35: CPT | Performed by: FAMILY MEDICINE

## 2017-04-13 PROCEDURE — G8979 MOBILITY GOAL STATUS: HCPCS

## 2017-04-13 PROCEDURE — 82962 GLUCOSE BLOOD TEST: CPT

## 2017-04-13 PROCEDURE — 63710000001 INSULIN ASPART PER 5 UNITS: Performed by: FAMILY MEDICINE

## 2017-04-13 PROCEDURE — 80053 COMPREHEN METABOLIC PANEL: CPT | Performed by: FAMILY MEDICINE

## 2017-04-13 PROCEDURE — 94799 UNLISTED PULMONARY SVC/PX: CPT

## 2017-04-13 PROCEDURE — 36600 WITHDRAWAL OF ARTERIAL BLOOD: CPT

## 2017-04-13 PROCEDURE — G8978 MOBILITY CURRENT STATUS: HCPCS

## 2017-04-13 PROCEDURE — 71010 HC CHEST PA OR AP: CPT

## 2017-04-13 PROCEDURE — 94760 N-INVAS EAR/PLS OXIMETRY 1: CPT

## 2017-04-13 RX ORDER — BUMETANIDE 0.25 MG/ML
2 INJECTION INTRAMUSCULAR; INTRAVENOUS 2 TIMES DAILY
Status: DISCONTINUED | OUTPATIENT
Start: 2017-04-13 | End: 2017-04-14

## 2017-04-13 RX ADMIN — Medication: at 17:11

## 2017-04-13 RX ADMIN — Medication: at 09:19

## 2017-04-13 RX ADMIN — HEPARIN SODIUM 5000 UNITS: 5000 INJECTION, SOLUTION INTRAVENOUS; SUBCUTANEOUS at 22:28

## 2017-04-13 RX ADMIN — METOPROLOL TARTRATE 25 MG: 25 TABLET ORAL at 09:09

## 2017-04-13 RX ADMIN — INSULIN ASPART 2 UNITS: 100 INJECTION, SOLUTION INTRAVENOUS; SUBCUTANEOUS at 22:29

## 2017-04-13 RX ADMIN — HYDROCODONE BITARTRATE AND ACETAMINOPHEN 1 TABLET: 10; 325 TABLET ORAL at 22:28

## 2017-04-13 RX ADMIN — INSULIN ASPART 2 UNITS: 100 INJECTION, SOLUTION INTRAVENOUS; SUBCUTANEOUS at 05:53

## 2017-04-13 RX ADMIN — HEPARIN SODIUM 5000 UNITS: 5000 INJECTION, SOLUTION INTRAVENOUS; SUBCUTANEOUS at 05:41

## 2017-04-13 RX ADMIN — CLOPIDOGREL BISULFATE 75 MG: 75 TABLET ORAL at 09:09

## 2017-04-13 RX ADMIN — HYDRALAZINE HYDROCHLORIDE 25 MG: 25 TABLET ORAL at 17:10

## 2017-04-13 RX ADMIN — TAZOBACTAM SODIUM AND PIPERACILLIN SODIUM 2.25 G: 250; 2 INJECTION, SOLUTION INTRAVENOUS at 05:42

## 2017-04-13 RX ADMIN — GABAPENTIN 100 MG: 100 CAPSULE ORAL at 22:28

## 2017-04-13 RX ADMIN — FERROUS SULFATE TAB EC 324 MG (65 MG FE EQUIVALENT) 324 MG: 324 (65 FE) TABLET DELAYED RESPONSE at 17:10

## 2017-04-13 RX ADMIN — HYDROCODONE BITARTRATE AND ACETAMINOPHEN 1 TABLET: 10; 325 TABLET ORAL at 09:09

## 2017-04-13 RX ADMIN — FERROUS SULFATE TAB EC 324 MG (65 MG FE EQUIVALENT) 324 MG: 324 (65 FE) TABLET DELAYED RESPONSE at 09:08

## 2017-04-13 RX ADMIN — Medication 10 ML: at 09:13

## 2017-04-13 RX ADMIN — INSULIN ASPART 2 UNITS: 100 INJECTION, SOLUTION INTRAVENOUS; SUBCUTANEOUS at 17:09

## 2017-04-13 RX ADMIN — INSULIN ASPART 2 UNITS: 100 INJECTION, SOLUTION INTRAVENOUS; SUBCUTANEOUS at 11:55

## 2017-04-13 RX ADMIN — PANTOPRAZOLE SODIUM 40 MG: 40 TABLET, DELAYED RELEASE ORAL at 09:09

## 2017-04-13 RX ADMIN — ATORVASTATIN CALCIUM 80 MG: 40 TABLET, FILM COATED ORAL at 22:28

## 2017-04-13 RX ADMIN — BUMETANIDE 2 MG: 0.25 INJECTION, SOLUTION INTRAMUSCULAR; INTRAVENOUS at 17:09

## 2017-04-13 RX ADMIN — METOPROLOL TARTRATE 25 MG: 25 TABLET ORAL at 22:34

## 2017-04-13 RX ADMIN — BUMETANIDE 2 MG: 0.25 INJECTION, SOLUTION INTRAMUSCULAR; INTRAVENOUS at 09:18

## 2017-04-13 RX ADMIN — HEPARIN SODIUM 5000 UNITS: 5000 INJECTION, SOLUTION INTRAVENOUS; SUBCUTANEOUS at 17:10

## 2017-04-13 NOTE — PROGRESS NOTES
[address] [address]  T - [phone]         PROGRESS NOTE         SUBJECTIVE:   Patient Care Team:  Himanshu Tovar MD as PCP - General (Family Medicine)    Chief Complaint:   CC:weakness and dyspnea.      Subjective     Patient is 61 y.o. male presents with dyspnea and weakness. He is still oliguric per Nephrology. Will need dialysis for some time..      ROS/HISTORY/ CURRENT MEDICATIONS/OBJECTIVE/VS/PE:   Review of Systems:   Review of Systems    History:     Past Medical History:   Diagnosis Date   • Acute cystitis    • BPH (benign prostatic hypertrophy)    • Calculus of kidney and ureter     recently passed      • Chest pain    • Crohn's disease    • Edema     unspecified - bilateral lower extremities     • Essential hypertension    • Hip pain    • Hypertensive disorder    • Knee pain    • Morbid obesity    • Nausea    • Nuclear senile cataract    • Osteoarthritis of multiple joints    • Paraumbilical hernia    • Right hand fracture     as a teen   • Severe concentric left ventricular hypertrophy    • Type 2 diabetes mellitus      no BDR    • Type 2 diabetes mellitus      no BDR      Past Surgical History:   Procedure Laterality Date   • CARDIAC CATHETERIZATION N/A 4/10/2017    Procedure: Right Heart Cath;  Surgeon: Bharath Dobbins MD PhD;  Location: [facility] CATH INVASIVE LOCATION;  Service:    • COLONOSCOPY  ; 11/03/0216    Diverticulosis in sigmoid colon. 1 polyp in sigmoid colon; removed by cold biopsy polypectomy. Hemorrhoids found   • CYSTOSCOPY, URETEROSCOPY, RETROGRADE PYELOGRAM, STENT INSERTION Bilateral 3/29/2017    Procedure: CYSTOSCOPY, BILATERAL RETROGRADE, URETEROSCOPY, LASER LITHOTRIPSY, STENT PLACEMENT; LASER LITHOTRIPSY BLADDER CALCULI;  Surgeon: Blake Lopez MD;  Location: [facility] OR;  Service:    • HYDROCELE EXCISION / REPAIR     • INJECTION OF MEDICATION  04/18/2016    Kenalog   • UPPER GASTROINTESTINAL ENDOSCOPY  11/03/2016      Family History   Problem Relation Age of Onset   • Cancer Other    • Diabetes Other    • Stroke Other    • Other Other      Colon problems    • Diabetes Mother    • Heart failure Mother    • Colon cancer Father    • Breast cancer Paternal Grandmother      Social History   Substance Use Topics   • Smoking status: Never Smoker   • Smokeless tobacco: Never Used   • Alcohol use No     Prescriptions Prior to Admission   Medication Sig Dispense Refill Last Dose   • diltiaZEM (TIAZAC) 360 MG 24 hr capsule TAKE 1 CAPSULE EVERY DAY 30 capsule 5 3/29/2017 at 1000   • FE BISGLY-FE [id number] PO Take  by mouth Daily.   3/28/2017 at 2200   • gabapentin (NEURONTIN) 100 MG capsule TAKE 1 CAPSULE AT BEDTIME FOR FOOT PAIN 30 capsule 5 3/28/2017 at 2200   • Glucosamine 500 MG capsule Take 500 mg by mouth 2 (Two) Times a Day.   4/5/2017 at Unknown time   • glyBURIDE (DIAbeta) 5 MG tablet TAKE 2 TABLETS TWICE DAILY BEFORE MEALS (Patient taking differently: TAKE 2 TABLETS BID) 120 tablet 5 3/28/2017 at 2200   • hydrALAZINE (APRESOLINE) 25 MG tablet Take 1 tablet by mouth 3 (Three) Times a Day. (Patient taking differently: Take 25 mg by mouth 2 (Two) Times a Day.) 90 tablet 3 3/29/2017 at 1000   • lisinopril (PRINIVIL,ZESTRIL) 20 MG tablet TAKE 1 TABLET BY MOUTH EVERY DAY FOR BLOOD PRESSURE 30 tablet 5 3/28/2017 at 1000   • nateglinide (STARLIX) 120 MG tablet TAKE 1 TABLET BY MOUTH BEFORE MEALS (Patient taking differently: daily) 90 tablet 3 3/28/2017 at 1000   • Omega-3 Fatty Acids (FISH OIL) 1000 MG capsule capsule Take 1,000 mg by mouth Daily With Breakfast.   3/28/2017 at 2200   • ONE TOUCH ULTRA TEST test strip USE THREE TIMES DAILY 100 each 11 Taking   • POLY-IRON 150 150 MG capsule TAKE 1 CAPSULE TWICE DAILY 60 capsule 3 3/28/2017 at 2200   • pravastatin (PRAVACHOL) 40 MG tablet Take 1 tablet by mouth Daily. 31 tablet 6 3/28/2017 at 1000   • promethazine (PHENERGAN) 12.5 MG tablet Take 1 tablet by mouth Every 6 (Six)  Hours As Needed (prn). 30 tablet 1 More than a month at Unknown time   • SITagliptin (JANUVIA) 100 MG tablet Take 1 tablet by mouth Daily. (Patient taking differently: Take 50 mg by mouth Daily.) 30 tablet 5 3/28/2017 at 1000   • VITAMIN D, ERGOCALCIFEROL, PO Take  by mouth Daily.   3/28/2017 at 1000   • amoxicillin-clavulanate (AUGMENTIN) 875-125 MG per tablet Take 1 tablet by mouth 2 (Two) Times a Day for 10 days. 20 tablet 0    • [] doxycycline (VIBRAMYCIN) 100 MG capsule Take 1 capsule by mouth Daily for 7 days. 7 capsule 0    • HYDROcodone-acetaminophen (NORCO)  MG per tablet Take 1 tablet by mouth Every 6 (Six) Hours As Needed for Moderate Pain (4-6).      • omeprazole (priLOSEC) 40 MG capsule Take 40 mg by mouth Daily.   3/29/2017 at 1000   • oxyCODONE-acetaminophen (PERCOCET) 7.5-325 MG per tablet Take 1-2 tablets by mouth Every 4 (Four) Hours As Needed (Pain). 15 tablet 0    • pantoprazole (PROTONIX) 40 MG EC tablet Take 40 mg by mouth Daily.   not started     Allergies:  Review of patient's allergies indicates no known allergies.    Current Medications:     Current Facility-Administered Medications   Medication Dose Route Frequency Provider Last Rate Last Dose   • albumin human 25 % IV SOLN 12.5 g  12.5 g Intravenous PRN Alanna Brand MD       • atorvastatin (LIPITOR) tablet 80 mg  80 mg Oral Nightly Bharath Dobbins MD PhD   80 mg at 17   • bumetanide (BUMEX) injection 2 mg  2 mg Intravenous BID Alanna Brand MD       • clopidogrel (PLAVIX) tablet 75 mg  75 mg Oral Daily Bharath Dobbins MD PhD   75 mg at 17 09   • dextrose (D50W) solution 25 g  25 g Intravenous Q15 Min PRN Hayley Valderrama MD       • dextrose (GLUTOSE) oral gel 15 g  15 g Oral Q15 Min PRN Hayley Valderrama MD       • dextrose 5 % and sodium chloride 0.45 % infusion  30 mL/hr Intravenous Continuous PRN Nestor Green MD       • ferrous sulfate EC tablet 324 mg  324 mg Oral BID With Meals Bharath  Harry Dobbins MD PhD   324 mg at 04/13/17 0908   • gabapentin (NEURONTIN) capsule 100 mg  100 mg Oral Nightly Hayley Valderrama MD   100 mg at 04/12/17 2144   • glucagon (human recombinant) (GLUCAGEN DIAGNOSTIC) injection 1 mg  1 mg Subcutaneous Q15 Min PRN Hayley Valderrama MD       • heparin (porcine) 5000 UNIT/ML injection 5,000 Units  5,000 Units Subcutaneous Q8H Bharath Dobbins MD PhD   5,000 Units at 04/13/17 0541   • heparin (porcine) injection 2,000 Units  2,000 Units Intravenous PRN Alanna Brand MD       • heparin (porcine) injection 2,000 Units  2,000 Units Intracatheter PRN Alanna Brand MD   2,000 Units at 04/12/17 1640   • hydrALAZINE (APRESOLINE) tablet 25 mg  25 mg Oral BID Hayley Valderrama MD   Stopped at 04/11/17 1830   • HYDROcodone-acetaminophen (NORCO)  MG per tablet 1 tablet  1 tablet Oral Q6H PRN Hayley Valderrama MD   1 tablet at 04/13/17 0909   • insulin aspart (novoLOG) injection 0-7 Units  0-7 Units Subcutaneous 4x Daily AC & at Bedtime Hayley Valderrama MD   2 Units at 04/13/17 0553   • ipratropium-albuterol (DUO-NEB) nebulizer solution 3 mL  3 mL Nebulization Q4H PRN Abby Fu MD       • lidocaine PF 1% (XYLOCAINE) injection 0.1 mL  0.1 mL Intradermal Once PRN Nsetor Green MD       • magic butt ointment   Topical BID Hayley Valderrama MD       • metoprolol tartrate (LOPRESSOR) tablet 25 mg  25 mg Oral Q12H Bharath Dobbins MD PhD   25 mg at 04/13/17 0909   • pantoprazole (PROTONIX) EC tablet 40 mg  40 mg Oral Daily Hayley Valderrama MD   40 mg at 04/13/17 0909   • pneumococcal polysaccharide 23-valent (PNEUMOVAX-23) vaccine 0.5 mL  0.5 mL Intramuscular During Hospitalization Javed Childers DO       • sodium chloride (OCEAN) nasal spray 1 spray  1 spray Each Nare PRN Abby Fu MD       • sodium chloride 0.9 % flush 1-10 mL  1-10 mL Intravenous PRN Hayley Valderrama MD   10 mL at 04/10/17 0824   • sodium chloride 0.9 % flush 1-10 mL  1-10 mL Intravenous PRN Bharath  Harry Dobbins MD PhD   10 mL at 04/12/17 1803   • sodium chloride 0.9 % flush 1-10 mL  1-10 mL Intravenous PRN Nestor Green MD   10 mL at 04/12/17 1101       Objective     Physical Exam:   Temp:  [98.2 °F (36.8 °C)-98.7 °F (37.1 °C)] 98.2 °F (36.8 °C)  Heart Rate:  [] 78  Resp:  [17-22] 22  BP: ()/(49-78) 127/65    Physical Exam   Constitutional: He appears well-developed and well-nourished.   HENT:   Head: Normocephalic and atraumatic.   Cardiovascular: Normal rate and regular rhythm.  Exam reveals no gallop and no friction rub.    No murmur heard.  Pulmonary/Chest: Effort normal and breath sounds normal. No respiratory distress. He has no wheezes. He has no rales.   Abdominal: Soft. Bowel sounds are normal. He exhibits no distension. There is no tenderness.   Skin: Skin is warm and dry.   Vitals reviewed.           Results Review:   Lab Results   Component Value Date    GLUCOSE 153 (H) 04/13/2017    BUN 53 (H) 04/13/2017    CREATININE 5.09 (H) 04/13/2017    EGFRIFNONA 12 (L) 04/13/2017    BCR 10.4 04/13/2017    CO2 24.0 04/13/2017    CALCIUM 8.8 04/13/2017    ALBUMIN 3.40 04/13/2017    LABIL2 1.1 04/13/2017    AST 40 04/13/2017    ALT 21 04/13/2017         WBC WBC   Date Value Ref Range Status   04/13/2017 14.99 (H) 3.20 - 9.80 10*3/mm3 Final   04/12/2017 13.93 (H) 3.20 - 9.80 10*3/mm3 Final   04/11/2017 14.36 (H) 3.20 - 9.80 10*3/mm3 Final      HGB Hemoglobin   Date Value Ref Range Status   04/13/2017 10.2 (L) 13.7 - 17.3 g/dL Final   04/12/2017 8.9 (L) 13.7 - 17.3 g/dL Final   04/11/2017 7.8 (L) 13.7 - 17.3 g/dL Final      HCT Hematocrit   Date Value Ref Range Status   04/13/2017 31.8 (L) 39.0 - 49.0 % Final   04/12/2017 27.5 (L) 39.0 - 49.0 % Final   04/11/2017 24.8 (L) 39.0 - 49.0 % Final      Platlets Platelets   Date Value Ref Range Status   04/13/2017 252 150 - 450 10*3/mm3 Final   04/12/2017 215 150 - 450 10*3/mm3 Final   04/11/2017 222 150 - 450 10*3/mm3 Final          Imaging  Results (last 24 hours)     Procedure Component Value Units Date/Time    XR Chest 1 View [[id number]] Collected:  04/13/17 0550     Updated:  04/13/17 0552    Narrative:         [facility] [place]    Patient Name: INGRID HARRIS    ORDERING: LEIDA PHILLIP    ATTENDING: LIZ POLO     REFERRING: LEIDA PHILLIP  -----------------------    PROCEDURE: Chest single view on 4/13/2017    CLINICAL INDICATION:  Respiratory failure, pulmonary edema    COMPARISON: 4/10/2017     FINDINGS: There has been mild improvement in bilateral opacities  consistent with mild improving edema or pneumonia. Heart is upper  limits normal for size. There is a probable small left pleural  effusion.      Impression:       CONCLUSION: Mild improvement in bilateral edema or pneumonia.    Electronically signed by:  Benjamín Roy  4/13/2017 5:51 AM  CDT Workstation: [id number]           I reviewed the patient's new clinical results.  I reviewed the patient's new imaging results and agree with the interpretation.     ASSESSMENT/PLAN:   Assessment/Plan   Principal Problem:    NSTEMI (non-ST elevated myocardial infarction)  Active Problems:    Type 2 diabetes mellitus    Osteoarthritis of multiple joints    Essential hypertension    Bacterial pneumonia    Morbid obesity with BMI of 50.0-59.9, adult    Acute cystitis    Heart failure with preserved ejection fraction, borderline, class III    Pulmonary hypertension    Anemia      Will transfer to the floor with telemetry.  Continue on current treatment.  Will look at placement.  I discussed the patients findings and my recommendations with patient.      Liz Polo MD  04/13/17  9:12 AM

## 2017-04-13 NOTE — PLAN OF CARE
Problem: Patient Care Overview (Adult)  Goal: Plan of Care Review  Outcome: Ongoing (interventions implemented as appropriate)    04/13/17 0615   Coping/Psychosocial Response Interventions   Plan Of Care Reviewed With patient   Patient Care Overview   Progress improving   Outcome Evaluation   Outcome Summary/Follow up Plan pt did not require cpap, only NC. Pt slept with hob up. pt rested well       Goal: Adult Individualization and Mutuality  Outcome: Ongoing (interventions implemented as appropriate)  Goal: Discharge Needs Assessment  Outcome: Ongoing (interventions implemented as appropriate)    Problem: Respiratory Insufficiency (Adult)  Goal: Acid/Base Balance  Outcome: Ongoing (interventions implemented as appropriate)  Goal: Effective Ventilation  Outcome: Ongoing (interventions implemented as appropriate)

## 2017-04-13 NOTE — PLAN OF CARE
Problem: Patient Care Overview (Adult)  Goal: Plan of Care Review  Outcome: Ongoing (interventions implemented as appropriate)    04/13/17 1523   Coping/Psychosocial Response Interventions   Plan Of Care Reviewed With patient   Patient Care Overview   Progress no change   Outcome Evaluation   Outcome Summary/Follow up Plan pt improving and moved to general med/surg floor today. plan for dialysis tomorrow 04/14/17       Goal: Adult Individualization and Mutuality  Outcome: Ongoing (interventions implemented as appropriate)  Goal: Discharge Needs Assessment  Outcome: Ongoing (interventions implemented as appropriate)    04/13/17 1523   Discharge Needs Assessment   Concerns To Be Addressed denies needs/concerns at this time         Problem: Fall Risk (Adult)  Goal: Absence of Falls  Outcome: Ongoing (interventions implemented as appropriate)    04/13/17 1523   Fall Risk (Adult)   Absence of Falls making progress toward outcome   No falls today    Problem: Respiratory Insufficiency (Adult)  Goal: Acid/Base Balance  Outcome: Ongoing (interventions implemented as appropriate)    04/13/17 1523   Respiratory Insufficiency (Adult)   Acid/Base Balance making progress toward outcome       Goal: Effective Ventilation  Outcome: Ongoing (interventions implemented as appropriate)    04/13/17 1523   Respiratory Insufficiency (Adult)   Effective Ventilation making progress toward outcome         Problem: Pressure Ulcer (Adult)  Goal: Signs and Symptoms of Listed Potential Problems Will be Absent or Manageable (Pressure Ulcer)  Outcome: Ongoing (interventions implemented as appropriate)    04/13/17 1523   Pressure Ulcer   Problems Assessed (Pressure Ulcer) all   Problems Present (Pressure Ulcer) pain   Stage 1 present on transfer from CCU

## 2017-04-13 NOTE — PROGRESS NOTES
Acute Care - Physical Therapy Initial Evaluation  [facility]     Patient Name: Blake Chavez  : 1955  MRN: [id number]  Today's Date: 2017   Onset of Illness/Injury or Date of Surgery Date: 17  Date of Referral to PT: 17  Referring Physician: Dr. Fu      Admit Date: 2017     Visit Dx:    ICD-10-CM ICD-9-CM   1. Pulmonary hypertension I27.2 416.8   2. Colon polyps K63.5 211.3   3. Impaired physical mobility Z74.09 781.99     Patient Active Problem List   Diagnosis   • Type 2 diabetes mellitus   • Paraumbilical hernia   • Osteoarthritis of multiple joints   • Hypertensive disorder   • Hip pain   • Essential hypertension   • Calculus of kidney and ureter   • Disorder of skin or subcutaneous tissue   • Bacterial pneumonia   • NSTEMI (non-ST elevated myocardial infarction)   • Morbid obesity with BMI of 50.0-59.9, adult   • Acute cystitis   • Heart failure with preserved ejection fraction, borderline, class III   • Pulmonary hypertension   • Anemia     Past Medical History:   Diagnosis Date   • Acute cystitis    • BPH (benign prostatic hypertrophy)    • Calculus of kidney and ureter     recently passed      • Chest pain    • Crohn's disease    • Edema     unspecified - bilateral lower extremities     • Essential hypertension    • Hip pain    • Hypertensive disorder    • Knee pain    • Morbid obesity    • Nausea    • Nuclear senile cataract    • Osteoarthritis of multiple joints    • Paraumbilical hernia    • Right hand fracture     as a teen   • Severe concentric left ventricular hypertrophy    • Type 2 diabetes mellitus      no BDR    • Type 2 diabetes mellitus      no BDR      Past Surgical History:   Procedure Laterality Date   • CARDIAC CATHETERIZATION N/A 4/10/2017    Procedure: Right Heart Cath;  Surgeon: Bharath Dobbins MD PhD;  Location: [facility] INVASIVE LOCATION;  Service:    • COLONOSCOPY  ; 216    Diverticulosis in sigmoid colon. 1 polyp in  sigmoid colon; removed by cold biopsy polypectomy. Hemorrhoids found   • CYSTOSCOPY, URETEROSCOPY, RETROGRADE PYELOGRAM, STENT INSERTION Bilateral 3/29/2017    Procedure: CYSTOSCOPY, BILATERAL RETROGRADE, URETEROSCOPY, LASER LITHOTRIPSY, STENT PLACEMENT; LASER LITHOTRIPSY BLADDER CALCULI;  Surgeon: Blake Lopez MD;  Location: [facility];  Service:    • HYDROCELE EXCISION / REPAIR     • INJECTION OF MEDICATION  04/18/2016    Kenalog   • UPPER GASTROINTESTINAL ENDOSCOPY  11/03/2016          PT ASSESSMENT (last 72 hours)      PT Evaluation       04/13/17 1014 04/10/17 1800    Rehab Evaluation    Document Type evaluation  -CZ     Subjective Information agree to therapy;complains of;pain  -CZ     Patient Effort, Rehab Treatment good  -CZ     Symptoms Noted During/After Treatment fatigue  -CZ     General Information    Patient Profile Review yes  -CZ     Onset of Illness/Injury or Date of Surgery Date 04/05/17  -CZ     Referring Physician Dr. Fu  -CZ     General Observations Patient up in recliner, alert, cooperative, supplemental O2 via nasal canula.   -CZ     Pertinent History Of Current Problem Patient to ER with SOB and chest pain.  He recently underwent Cytoscopy with stent placement. He was diagnosed with B Pneumonia and then STEMI for which he was transferred to CCU.   -CZ     Precautions/Limitations fall precautions  -CZ     Prior Level of Function independent:;all household mobility;gait;transfer;using stairs  -CZ     Equipment Currently Used at Home cane, quad  -CZ cane, quad;shower chair  -    Plans/Goals Discussed With patient  -CZ     Benefits Reviewed patient:  -CZ     Living Environment    Lives With spouse;child(luis), adult  -CZ     Living Arrangements house  -CZ     Home Accessibility stairs to enter home  -CZ     Number of Stairs to Enter Home 2  -CZ     Stair Railings at Home outside, present at both sides   2 steps/no rail from garage; 3 steps with rails in front.   -CZ     Clinical Impression     Date of Referral to PT 04/13/17  -CZ     PT Diagnosis Impaired physical mobility  -CZ     Prognosis good  -CZ     Functional Level At Time Of Evaluation Patient requires min Ax1 with transfers, and with gait for short distances with a RW.  -CZ     Criteria for Skilled Therapeutic Interventions Met treatment indicated  -CZ     Pathology/Pathophysiology Noted (Describe Specifically for Each System) musculoskeletal;cardiovascular  -CZ     Impairments Found (describe specific impairments) aerobic capacity/endurance;gait, locomotion, and balance  -CZ     Rehab Potential good, to achieve stated therapy goals  -CZ     Predicted Duration of Therapy Intervention (days/wks) 5-7 days  -CZ     Vital Signs    Pre Systolic BP Rehab 123  -CZ     Pre Treatment Diastolic BP 64  -CZ     Post Systolic BP Rehab 139  -CZ     Post Treatment Diastolic BP 80  -CZ     Pretreatment Heart Rate (beats/min) 80  -CZ     Posttreatment Heart Rate (beats/min) 89  -CZ     Pre SpO2 (%) 99  -CZ     O2 Delivery Pre Treatment supplemental O2   2 LPM  -CZ     Intra SpO2 (%) 94  -CZ     O2 Delivery Intra Treatment supplemental O2  -CZ     Post SpO2 (%) 92  -CZ     Pre Patient Position Sitting  -CZ     Post Patient Position Sitting  -CZ     Pain Assessment    Pain Assessment 0-10  -CZ     Pain Score 4  -CZ     Post Pain Score 4  -CZ     Pain Type Acute pain  -CZ     Pain Location Foot  -CZ     Pain Orientation Right;Left   Secondary to edema.  -CZ     Vision Assessment/Intervention    Visual Impairment WFL with corrective lenses  -CZ     Cognitive Assessment/Intervention    Current Cognitive/Communication Assessment functional  -CZ     Orientation Status oriented x 4  -CZ     Follows Commands/Answers Questions 100% of the time  -CZ     Personal Safety WNL/WFL  -CZ     Personal Safety Interventions nonskid shoes/slippers when out of bed  -CZ     ROM (Range of Motion)    General ROM no range of motion deficits identified  -CZ     General ROM Detail WFL  BLE (limited somewhat by obese abdomen)  -CZ     MMT (Manual Muscle Testing)    General MMT Assessment lower extremity strength deficits identified  -CZ     General MMT Assessment Detail 4/5 BLE strength grossly.  -CZ     Transfer Assessment/Treatment    Transfers, Bed-Chair [place] contact guard assist  -CZ     Transfers, Chair-Bed [place] contact guard assist  -CZ     Transfers, Bed-Chair-Bed, Assist Device rolling walker  -CZ     Transfers, Sit-Stand [place] minimum assist (75% patient effort)  -CZ     Transfers, Stand-Sit [place] minimum assist (75% patient effort)  -CZ     Toilet Transfer, [place] contact guard assist  -CZ     Toilet Transfer, Assistive Device rolling walker  -CZ     Gait Assessment/Treatment    Gait, [place] Level minimum assist (75% patient effort)  -CZ     Gait, Assistive Device rolling walker  -CZ     Gait, Distance (Feet) 5  -CZ     Gait, Comment Wide stance, relies heavily on UEs.  -CZ     Positioning and Restraints    Pre-Treatment Position sitting in chair/recliner  -CZ     Post Treatment Position chair  -CZ     In Chair sitting;call light within reach;encouraged to call for assist  -CZ       User Key  (r) = Recorded By, (t) = Taken By, (c) = Cosigned By    Initials Name Provider Type    VC Nanci Shaffer RN Registered Nurse     Erick Corona, PT Physical Therapist          Physical Therapy Education     Title: PT OT SLP Therapies (Active)     Topic: Physical Therapy (Active)     Point: Body mechanics (Active)    Learning Progress Summary    Learner Readiness Method Response Comment Documented by Status   Patient Acceptance E NR Patient educated on proper technique for sit to stand and recliner to commode.  04/13/17 1126 Active                      User Key     Initials Effective Dates Name Provider Type Discipline     02/17/17 -  rEick Corona, [place] Physical Therapist PT                PT Recommendation and Plan  Anticipated Discharge Disposition:  home with home health  Planned Therapy Interventions: balance training, bed mobility training, gait training, patient/family education, stair training, swiss ball techniques  PT Frequency: other (see comments) (5-14x/week)  Plan of Care Review  Plan Of Care Reviewed With: patient  Outcome Summary/Follow up Plan: Initial PT evaluation complete.  Patient alert and cooperative.  Patient requires min Ax1 for transfers and gait for short distances with a RW.  Patient would benefit from skilled PT intervention to return to OF  and safely to community. Recommend HHPT upon discharge.           IP PT Goals       04/13/17 1014          Bed Mobility PT STG    Bed Mobility PT STG, Date Established 04/13/17  -CZ      Bed Mobility PT STG, Time to Achieve 5 - 7 days  -CZ      Bed Mobility PT STG, Activity Type all bed mobility  -CZ      Bed Mobility PT STG, [place] Level supervision required  -CZ      Bed Mobility PT STG, Additional Goal May elevate HOB  -CZ      Bed Mobility PT STG, Date Goal Reviewed 04/13/17  -CZ      Transfer Training PT STG    Transfer Training PT STG, Date Established 04/13/17  -CZ      Transfer Training PT STG, Time to Achieve 5 - 7 days  -CZ      Transfer Training PT STG, Activity Type all transfers  -CZ      Transfer Training PT STG, [place] Level supervision required  -CZ      Transfer Training PT STG, Assist Device walker, rolling  -CZ      Transfer Training PT STG, Date Goal Reviewed 04/13/17  -CZ      Gait Training PT LTG    Gait Training Goal PT LTG, Date Established 04/13/17  -CZ      Gait Training Goal PT LTG, Time to Achieve by discharge  -CZ      Gait Training Goal PT LTG, [place] Level supervision required  -CZ      Gait Training Goal PT LTG, Assist Device walker, rolling  -CZ      Gait Training Goal PT LTG, Distance to Achieve 25'x1  -CZ      Gait Training Goal PT LTG, Date Goal Reviewed 04/13/17  -CZ        User Key  (r) = Recorded By, (t) = Taken By, (c) = Cosigned By     Initials Name Provider Type    CZ Erick Corona PT Physical Therapist                Outcome Measures       04/13/17 1014          How much help from another person do you currently need...    Turning from your back to your side while in flat bed without using bedrails? 2  -CZ      Moving from lying on back to sitting on the side of a flat bed without bedrails? 2  -CZ      Moving to and from a bed to a chair (including a wheelchair)? 3  -CZ      Standing up from a chair using your arms (e.g., wheelchair, bedside chair)? 3  -CZ      Climbing 3-5 steps with a railing? 2  -CZ      To walk in hospital room? 2  -CZ      AM-PAC 6 Clicks Score 14  -CZ      Functional Assessment    Outcome Measure Options AM-PAC 6 Clicks Basic Mobility (PT)  -CZ        User Key  (r) = Recorded By, (t) = Taken By, (c) = Cosigned By    Initials Name Provider Type    CZ Erick Corona PT Physical Therapist           Time Calculation:         PT Charges       04/13/17 1132          Time Calculation    Start Time 1014  -CZ      Stop Time 1111  -CZ      Time Calculation (min) 57 min  -CZ      PT Received On 04/13/17  -CZ      PT Goal Re-Cert Due Date 04/26/17  -      Time Calculation- PT    Total Timed Code Minutes- PT 42 minute(s)  -CZ        User Key  (r) = Recorded By, (t) = Taken By, (c) = Cosigned By    Initials Name Provider Type     Erick Corona PT Physical Therapist          Therapy Charges for Today     Code Description Service Date Service Provider Modifiers Qty    22539178707 HC PT MOBILITY CURRENT 4/13/2017 Erick Corona, PT GP, CK 1    23035769953 HC PT MOBILITY PROJECTED 4/13/2017 Erick Corona, PT GP, CJ 1    04212060989 HC PT EVAL MOD COMPLEXITY 2 4/13/2017 Erick Corona, PT GP 1    62504532295 HC PT THERAPEUTIC ACT EA 15 MIN 4/13/2017 Erick Corona PT GP 3          PT G-Codes  PT Professional Judgement Used?: Yes  Outcome Measure Options: AM-PAC 6 Clicks Basic Mobility (PT)  Score: 14  Functional Limitation:  Mobility: Walking and moving around  Mobility: Walking and Moving Around Current Status (): At least 40 percent but less than 60 percent impaired, limited or restricted  Mobility: Walking and Moving Around Goal Status (): At least 20 percent but less than 40 percent impaired, limited or restricted      Erick Corona, PT  4/13/2017

## 2017-04-13 NOTE — PLAN OF CARE
Problem: Patient Care Overview (Adult)  Goal: Plan of Care Review  Outcome: Ongoing (interventions implemented as appropriate)    04/13/17 1014   Coping/Psychosocial Response Interventions   Plan Of Care Reviewed With patient   Outcome Evaluation   Outcome Summary/Follow up Plan Initial PT evaluation complete. Patient alert and cooperative. Patient requires min Ax1 for transfers and gait for short distances with a RW. Patient would benefit from skilled PT intervention to return to PLOF and safely to community. Recommend HHPT upon discharge.          Problem: Inpatient Physical Therapy  Goal: Bed Mobility Goal STG- PT  Outcome: Ongoing (interventions implemented as appropriate)    04/13/17 1014   Bed Mobility PT STG   Bed Mobility PT STG, Date Established 04/13/17   Bed Mobility PT STG, Time to Achieve 5 - 7 days   Bed Mobility PT STG, Activity Type all bed mobility   Bed Mobility PT STG, [place] Level supervision required   Bed Mobility PT STG, Additional Goal May elevate HOB   Bed Mobility PT STG, Date Goal Reviewed 04/13/17       Goal: Transfer Training Goal 1 STG- PT  Outcome: Ongoing (interventions implemented as appropriate)    04/13/17 1014   Transfer Training PT STG   Transfer Training PT STG, Date Established 04/13/17   Transfer Training PT STG, Time to Achieve 5 - 7 days   Transfer Training PT STG, Activity Type all transfers   Transfer Training PT STG, [place] Level supervision required   Transfer Training PT STG, Assist Device walker, rolling   Transfer Training PT STG, Date Goal Reviewed 04/13/17       Goal: Gait Training Goal LTG- PT  Outcome: Ongoing (interventions implemented as appropriate)    04/13/17 1014   Gait Training PT LTG   Gait Training Goal PT LTG, Date Established 04/13/17   Gait Training Goal PT LTG, Time to Achieve by discharge   Gait Training Goal PT LTG, [place] Level supervision required   Gait Training Goal PT LTG, Assist Device walker, rolling   Gait Training Goal PT  LTG, Distance to Achieve 25'x1   Gait Training Goal PT LTG, Date Goal Reviewed 04/13/17

## 2017-04-13 NOTE — PROGRESS NOTES
CRITICAL CARE PROGRESS NOTE  Abby Fu MD    [facility] CRITICAL CARE  4/13/2017        Blake Chavez  [id number]  1955  61 y.o. male            LOS: 8 days   Himanshu Tovar MD    Chief Complaint/Reason for visit: F/u acute hypoxic respiratory failure, HAP    Subjective     Interval History:   History taken from: patient/ chart    HD yesterday with 2.5L removed. Weight remains at 394 but CXR and edema improved. Weaned to 3lpm O2 this AM. Reports he had more trouble breathing overnight but remained on O2. Has not worn CPAP in over 48hrs.     Review of Systems:   Review of Systems   Constitutional: Negative for fever.   Respiratory: Positive for cough and shortness of breath. Negative for wheezing.    Cardiovascular: Positive for leg swelling. Negative for chest pain and palpitations.   Gastrointestinal: Negative for abdominal pain.     All systems were reviewed and negative except as noted above in the HPI.    Medical history, surgical history, social history, family history reviewed    Objective     Intake/Output:    Intake/Output Summary (Last 24 hours) at 04/13/17 0724  Last data filed at 04/13/17 0500   Gross per 24 hour   Intake             1755 ml   Output             2760 ml   Net            -1005 ml       Nutrition: PO    Infusions:    dextrose 5 % and sodium chloride 0.45 % 30 mL/hr       Respiratory:       Vital Sign Min/Max for last 24 hours:  Temp  Min: 98.5 °F (36.9 °C)  Max: 98.7 °F (37.1 °C)   BP  Min: 91/63  Max: 114/74   Pulse  Min: 73  Max: 102   Resp  Min: 17  Max: 22   SpO2  Min: 90 %  Max: 100 %   Flow (L/min)  Min: 5  Max: 5   Weight  Min: 394 lb 10 oz (179 kg)  Max: 394 lb 10 oz (179 kg)     Physical Exam:  98.7 °F (37.1 °C) (Oral) 76 112/56 22 95% (!) 394 lb 10 oz (179 kg) Body mass index is 52.58 kg/(m^2).  Physical Exam   Constitutional: He is oriented to person, place, and time. Vital signs are normal. He appears well-developed and well-nourished.    morbidly obese   HENT:   Head: Normocephalic and atraumatic.   Nose: Nose normal.   Mouth/Throat: Mucous membranes are normal.   Eyes: EOM are normal.   Neck:   Large neck circumference   Cardiovascular: Normal rate, regular rhythm and normal heart sounds.  Exam reveals no gallop.    No murmur heard.  Pulmonary/Chest: Effort normal. No accessory muscle usage. No respiratory distress. He has no wheezes. He has no rhonchi.   Abdominal: Soft. Normal appearance and bowel sounds are normal. There is no tenderness.   Morbidly obese   Musculoskeletal:        Right lower leg: He exhibits edema.        Left lower leg: He exhibits edema.   Neurological: He is alert and oriented to person, place, and time.   Skin: Skin is warm and dry. No cyanosis. Nails show no clubbing.   Psychiatric: He has a normal mood and affect. His behavior is normal. Judgment normal. Cognition and memory are normal.       Central Lines/PICC: Present     Results Review:  I personally reviewed the patient's new clinical results.   Lab Results (last 24 hours)     Procedure Component Value Units Date/Time    POC Glucose Fingerstick [[id number]]  (Abnormal) Collected:  04/12/17 0816    Specimen:  Blood Updated:  04/12/17 0827     Glucose 170 (H) mg/dL       Sliding Scale AdminMeter: [id number]: [id number] ALMA RENNY       Occult Blood X 1, Stool [[id number]]  (Abnormal) Collected:  04/12/17 0943    Specimen:  Stool Updated:  04/12/17 1057     Fecal Occult Blood Positive (A)    POC Glucose Fingerstick [[id number]]  (Abnormal) Collected:  04/12/17 1135    Specimen:  Blood Updated:  04/12/17 1135     Glucose 218 (A) mg/dL     POC Glucose Fingerstick [[id number]]  (Abnormal) Collected:  04/12/17 1126    Specimen:  Blood Updated:  04/12/17 1148     Glucose 218 (H) mg/dL       Sliding Scale AdminMeter: [id number]: [id number] [facility]       POC Glucose Fingerstick [[id number]]  (Normal) Collected:  04/12/17 1652    Specimen:  Blood  Updated:  04/12/17 1744     Glucose 130 mg/dL       Meter: [id number]: [id number] ALMA LAWSON       POC Glucose Fingerstick [[id number]]  (Abnormal) Collected:  04/12/17 2042    Specimen:  Blood Updated:  04/12/17 2058     Glucose 191 (H) mg/dL       Sliding Scale AdminMeter: [id number]: [id number] EDELMIRA CHIP       POC Glucose Fingerstick [[id number]]  (Abnormal) Collected:  04/13/17 0552    Specimen:  Blood Updated:  04/13/17 0607     Glucose 159 (H) mg/dL       Sliding Scale AdminMeter: [id number]: [id number] [facility] CHIP             Results from last 7 days  Lab Units 04/09/17  0435   PH, ARTERIAL pH units 7.341*   PO2 ART mm Hg 95.5   PCO2, ARTERIAL mm Hg 37.1   HCO3 ART mmol/L 19.6*     Lab Results   Component Value Date    BLOODCX No growth at 5 days 04/05/2017     Lab Results   Component Value Date    URINECX >100,000 CFU/mL Enterococcus faecalis (A) 04/07/2017       I independently reviewed the patient's new imaging, including images and reports.  Imaging Results (last 24 hours)     Procedure Component Value Units Date/Time    IR insert non-tunneled CVC 5+ [[id number]] Updated:  04/12/17 0819    XR Chest 1 View [[id number]] Collected:  04/13/17 0550     Updated:  04/13/17 0552    Narrative:         [facility] [place]    Patient Name: INGRID HARRIS    ORDERING: LEIDA PHILLIP    ATTENDING: LIZ POLO     REFERRING: LEIDA PHILLIP  -----------------------    PROCEDURE: Chest single view on 4/13/2017    CLINICAL INDICATION:  Respiratory failure, pulmonary edema    COMPARISON: 4/10/2017     FINDINGS: There has been mild improvement in bilateral opacities  consistent with mild improving edema or pneumonia. Heart is upper  limits normal for size. There is a probable small left pleural  effusion.      Impression:       CONCLUSION: Mild improvement in bilateral edema or pneumonia.    Electronically signed by:  Benjamín Roy  4/13/2017 5:51 AM  CDT Workstation:  [id number]              All medications reviewed.     atorvastatin 80 mg Oral Nightly   clopidogrel 75 mg Oral Daily   ferrous sulfate 324 mg Oral BID With Meals   gabapentin 100 mg Oral Nightly   heparin (porcine) 5,000 Units Subcutaneous Q8H   hydrALAZINE 25 mg Oral BID   insulin aspart 0-7 Units Subcutaneous 4x Daily AC & at Bedtime   magic butt ointment  Topical BID   metoprolol tartrate 25 mg Oral Q12H   pantoprazole 40 mg Oral Daily         Assessment/Plan     ASSESSMENT:  # Acute hypoxemic respiratory failure- likely multifactorial from HAP and volume overload  # Probable HAP  # Pulmonary edema/ volume overload- improving with HD  # PVH  # NSTEMI- troponin trending down  # Morbid obesity  # Probable GRAHAM/ OHS  # HFpEF  # BRANDO- now on HD  # DM  # Anemia- likely due to blood draws in setting of kidney dz  # UTI      PLAN:  -Wean O2 to keep sats >90%. Goal of off.  -CPAP QHS as tolerated. Will need sleep study as outpt.  -Completed adequate empiric coverage for HAP and UTI.  -Duonebs prn  -OOB to chair to optimize respiratory mechanics  -Pulmonary toilet  -PT consult  -Cardiac management per Dr. Dobbins  -Continue HD for UF    VTE Prophylaxis: Heparin TID  Stress Ulcer Prophylaxis: Protonix    Stable for transfer to floor per primary team. Interval improvement on CXR c/w resolving edema.    Critical Care Time Spent: 21 minutes  I personally provided care to this critically ill patient as documented above.  Critical care time does not include time spent on separately billed procedures.  None of my critical care time was concurrent with other critical care providers.         This document has been electronically signed by Abby Fu MD on April 13, 2017 7:24 AM      [phone]    EMR Dragon/Transcription disclaimer:     Much of this encounter note is an electronic transcription/translation of spoken language to printed text. The electronic translation of spoken language may permit erroneous, or at  times, nonsensical words or phrases to be inadvertently transcribed; Although I have reviewed the note for such errors, some may still exist.

## 2017-04-13 NOTE — PROGRESS NOTES
"Cardiovascular Daily Progress Note  Bharath Dobbins M.D., Ph.D., [facility]      Subjective     Interval History:   Remains in CCU. Stable dyspnea.  HD per nephro. No CP.     Review of Systems - History obtained from chart review and the patient  Respiratory ROS: positive for - shortness of breath  Cardiovascular ROS: negative for - chest pain    Objective     Vital Sign Min/Max for last 24 hours  Temp  Min: 98.5 °F (36.9 °C)  Max: 98.7 °F (37.1 °C)   BP  Min: 91/63  Max: 114/74   Pulse  Min: 73  Max: 102   Resp  Min: 17  Max: 22   SpO2  Min: 90 %  Max: 100 %   Flow (L/min)  Min: 5  Max: 5   Weight  Min: 394 lb 10 oz (179 kg)  Max: 394 lb 10 oz (179 kg)     Flowsheet Rows         First Filed Value    Admission Height  72.99\" (185.4 cm) Documented at 04/06/2017 1542    Admission Weight  (!)  386 lb 6.4 oz (175 kg) Documented at 04/05/2017 1659        Last 3 weights    04/10/17  0518 04/10/17  1834 04/12/17  1714   Weight: (!) 394 lb (179 kg) (!) 394 lb 10 oz (179 kg) (!) 394 lb 10 oz (179 kg)     Body mass index is 52.58 kg/(m^2).    Physical Exam:   GEN: alert, appears stated age and cooperative wearing BiPAP  Body Habitus: morbidly obese  HEENT: Head: Normocephalic, no lesions, without obvious abnormality.  Neck / Thyroid: Supple, no masses, nodes, nodules or enlargement. No arcus senilis, xanthelasma or xanthomas.   JVP: 12 cm of water at 45 degrees HJR: Present    Carotid:  Upstroke: easily palpated bilaterally Volume: Normal.    Carotid Bruit:  None  Subclavian Bruit: Not present.    Chest:  Normal Excursion: Good    I:E: Good  Pulmonary:clear to auscultation, no wheezes, rales or rhonchi, symmetric air entry      Precordium:  No palpable heaves or thrusts. P2 is not palpable.   Macon:  normal size and placement Palpable S4: Not present.   Heart rate: normal  Heart Rhythm: regular     Heart Sounds: S1: normal intensity  S2: increased P2  S3: absent   S4: absent  Opening Snap: absent  A2-OS:  N/A  Pericardial rub: " absent    Ejection click: None      Murmurs: Systolic: none  Diastolic: none  Extremity: 2+ edema  Pulses: Right radial artery has 2+ (normal) pulse and Left radial artery has 2+ (normal) pulse         DATA REVIEWED:    Right heart pressures:  RA: 15 mmHg  RV: 43/9 with a mean right ventricular pressure of 16    PA: 47/21 with a mean PA pressure 32  PCWP:  21 mmHg   DP     Resistance:  SVR: 818 dynes · sec/cm5 PVR: 160 dynes · sec/cm5     Saturations:  PA: 65.3%         Lab Results (last 24 hours)     Procedure Component Value Units Date/Time    POC Glucose Fingerstick [[id number]]  (Abnormal) Collected:  17 0816    Specimen:  Blood Updated:  17 0827     Glucose 170 (H) mg/dL       Sliding Scale AdminMeter: [id number]: [id number] ALMA RENNY       Occult Blood X 1, Stool [[id number]]  (Abnormal) Collected:  17 0943    Specimen:  Stool Updated:  17 1057     Fecal Occult Blood Positive (A)    POC Glucose Fingerstick [[id number]]  (Abnormal) Collected:  17 1135    Specimen:  Blood Updated:  17 1135     Glucose 218 (A) mg/dL     POC Glucose Fingerstick [[id number]]  (Abnormal) Collected:  17 1126    Specimen:  Blood Updated:  17 1148     Glucose 218 (H) mg/dL       Sliding Scale AdminMeter: [id number]: [id number] ALMA RENNY       POC Glucose Fingerstick [[id number]]  (Normal) Collected:  17 1652    Specimen:  Blood Updated:  17 1744     Glucose 130 mg/dL       Meter: [id number]: [id number] ALMA RENNY       POC Glucose Fingerstick [[id number]]  (Abnormal) Collected:  17 2042    Specimen:  Blood Updated:  17 2058     Glucose 191 (H) mg/dL       Sliding Scale AdminMeter: [id number]: [id number] [facility] CHIP       POC Glucose Fingerstick [[id number]]  (Abnormal) Collected:  17 0552    Specimen:  Blood Updated:  17 0607     Glucose 159 (H) mg/dL       Sliding Scale AdminMeter: [id number]:  [id number] [facility] CHIP           Imaging Results (last 24 hours)     Procedure Component Value Units Date/Time    US Venous Doppler Lower Extremity Bilateral (duplex) [[id number]] Collected:  04/06/17 1007     Updated:  04/06/17 1010    Narrative:       Patient Name:  INGRID HARRIS  Patient ID:  [id number]   Ordering:  SHERON KELLER  Attending:  RIC GUILLORY  Referring:  SHERON KELLER  ------------------------------------------------  Doppler duplex venous examination bilateral lower extremities.  CLINICAL INDICATION: Leg swelling.      COMPARISON: None    FINDINGS:    The common femoral,  femoral, and popliteal veins of the  bilateral     lower extremity are well identified and compress  normally.  Doppler signals are heard either spontaneously or on  distal compression.  No evidence of intraluminal thrombus was  noted.     The visualized posterior calf veins are unremarkable.      Impression:       CONCLUSION:  No evidence of deep venous thrombosis in the common  femoral, superficial femoral veins, or popliteal veins of the  bilateral lower extremities.         Electronically signed by:  Leo Kelly MD  4/6/2017 10:09 AM CDT  Workstation: [id number]    XR Chest 1 View [[id number]] Collected:  04/06/17 2132     Updated:  04/06/17 2137    Narrative:       Exam: AP portable chest    INDICATION: Dyspnea    COMPARISON: 4/5/2017    FINDINGS: AP portable chest. Heart size upper limits of normal.  Interval increase in the bilateral airspace opacity and  infiltrates. Pulmonary vascularity is mildly prominent. Cannot  exclude a small left pleural effusion.      Impression:       Interval increase in the bilateral airspace  opacity/infiltrates    Electronically signed by:  Vicente Nascimento MD  4/6/2017 9:36 PM CDT  Workstation: [id number]        · Left Ventricle: Estimated EF appears to be in the range of 51 - 55%  · Global left ventricular wall motion appears abnormal. The left ventricular cavity is  moderate-to-severely dilated  · Left ventricular diastolic dysfunction is noted (grade II w/high LAP) consistent with pseudonormalization. Elevated left atrial pressure  · Right Ventricle: Normal wall thickness, systolic function and septal motion noted. Right ventricular cavity is mildly dilated.  · The inferior vena cava is dilated. Normal IVC inspiratory collapse of greater than 50% noted.  · Mild mitral valve regurgitation is present.  · Trace to mild tricuspid valve regurgitation is present.  · Calculated right ventricular systolic pressure from tricuspid regurgitation is 66 mmHg  · Severe pulmonary hypertension is present.  · There is no evidence of pericardial effusion.    LE Duplex  FINDINGS:     The common femoral, femoral, and popliteal veins of the  bilateral lower extremity are well identified and compress  normally. Doppler signals are heard either spontaneously or on  distal compression. No evidence of intraluminal thrombus was  noted.      The visualized posterior calf veins are unremarkable.    Assessment/Plan      1. HFpEF. NYHA stage C; FC-III. Today, he remains markedly hypervolemic, but perfused. Volume status with interval improvement since HD.     -RRT per nephrology  -Hydralazine and Lopressor  -Continue 2 L fluid restriction and low sodium diet    2. PVH with in-proportion DPG. He is WHO-Class-II.   -As above  -Will need outpatient PFTs and sleep evaluation for GRAHAM    3. NSTEMI.  The patient is chest pain-free and hemodynamically stable.  He is currently tolerating OMT.  Deferred considerations for LHC given his worsening renal function.  -ASA 81mg; Plavix 75mg PO daily; Atorvastatin 80mg  -Lopressor  -Deferred consideration for LHC pending improvement in renal function    Thank you for allowing me to participate in the care of your patient.  Will continue to follow.          This document has been electronically signed by Bharath Dobbins MD PhD on April 13, 2017 7:47 AM

## 2017-04-13 NOTE — PROGRESS NOTES
"[facility]  [address] [place] [address]. [id number]  T - [phone]  F - [phone]     Progress Note          PATIENT  DEMOGRAPHICS   PATIENT NAME: Blake Chavez                      PHYSICIAN: Alanna Brand MD  : 1955  MRN: [id number]   LOS: 8 days    Patient Care Team:  Himanshu Tovar MD as PCP - General (Family Medicine)  Subjective   SUBJECTIVE   Soa improved s/p dialysis yesterday, although he did have soa and difficulty sleeping overnight, not using CPAP since it doesn't \"fit\" right.  Currently sitting in chair bedside.        Objective   OBJECTIVE   Vital Signs  Temp:  [98.2 °F (36.8 °C)-98.7 °F (37.1 °C)] 98.2 °F (36.8 °C)  Heart Rate:  [] 78  Resp:  [17-22] 22  BP: ()/(49-78) 127/65    Flowsheet Rows         First Filed Value    Admission Height  72.99\" (185.4 cm) Documented at 2017 1542    Admission Weight  (!)  386 lb 6.4 oz (175 kg) Documented at 2017 1659           I/O last 3 completed shifts:  In: 2825 [P.O.:1570; I.V.:205; Blood:300; IV Piggyback:750]  Out: 5800 [Urine:300; Other:5500]    PHYSICAL EXAM    Physical Exam   Extremities entry bilaterally decrease breath sounds at bases, bibasal crackles  Heart regular rate and rhythm no gallop.  Abdomen obese soft nontender distended bowel sounds are present.  Extremities trace edema in both ankles    RESULTS   Results Review:      Results from last 7 days  Lab Units 17  0246 17  0226 04/10/17  0155  17  0600   SODIUM mmol/L 140 144 143  < > 148*   SODIUM, ARTERIAL   --   --   --   < >  --    POTASSIUM mmol/L 3.6 3.9 4.0  < > 4.4   CHLORIDE mmol/L 102 110 109  < > 113*   TOTAL CO2 mmol/L 22.0 17.0* 19.0*  < > 20.0*   BUN mg/dL 50* 66* 62*  < > 48*   CREATININE mg/dL 4.25* 4.81* 3.89*  < > 3.22*   CALCIUM mg/dL 8.0* 8.3* 8.0*  < > 9.0   BILIRUBIN mg/dL  --   --  0.2  --  0.4   ALK PHOS U/L  --   --  77  --  106   ALT (SGPT) U/L  --   --  17*  --  12*   AST (SGOT) " U/L  --   --  36  --  23   GLUCOSE mg/dL 178* 172* 209*  < > 164*   GLUCOSE, ARTERIAL   --   --   --   < >  --    < > = values in this interval not displayed.    Estimated Creatinine Clearance: 29.9 mL/min (by C-G formula based on Cr of 4.25).      Results from last 7 days  Lab Units 04/12/17  0246 04/11/17  0226 04/10/17  0155   PHOSPHORUS mg/dL 5.9* 6.8* 5.6*               Results from last 7 days  Lab Units 04/13/17  0823 04/12/17  0246 04/11/17  0226 04/10/17  0155 04/09/17  0300   WBC 10*3/mm3 14.99* 13.93* 14.36* 14.76* 15.39*   HEMOGLOBIN g/dL 10.2* 8.9* 7.8* 7.2* 7.7*   PLATELETS 10*3/mm3 252 215 222 199 236         Results from last 7 days  Lab Units 04/12/17  0246   INR  1.37*         Imaging Results (last 24 hours)     Imaging Results (last 24 hours)     Procedure Component Value Units Date/Time    CT Abdomen Pelvis Without Contrast [[id number]] Collected:  04/10/17 2120     Updated:  04/10/17 2133    Narrative:         [facility]    Patient Name: INGRID HARRIS    ORDERING: ROSA MARIA HOLT    ATTENDING: RIC GUILLORY     REFERRING: ROSA MARIA HOLT  -----------------------    PROCEDURE: CT abdomen and pelvis without contrast on 4/10/2017    CLINICAL INDICATION:  Bilateral ureteral stones, acute renal  insufficiency, right hydronephrosis    TECHNIQUE: Multiple axial images are obtained throughout the  abdomen and pelvis without the administration of contrast. This  study was performed with techniques to keep radiation doses as  low as reasonably achievable, (ALARA).   Total DLP is 5847.1 mGy*cm.    COMPARISON: 2/10/2017     FINDINGS:     ABDOMEN: There has been development of moderate size bilateral  pleural effusions with adjacent bibasilar atelectasis and likely  component of pneumonia in the right lower lobe. Right ureteral  stent is noted extending from the upper pole collecting system of  the right kidney into the right aspect of the bladder and appears  in good position. There  remains a large right renal pelvis stone  measuring up to 2.4 cm in greatest diameter with other smaller  nonobstructing right renal stones. There are no ureteral stones  and no hydronephrosis. No left-sided renal or ureteral stone is  now noted. Stable left adrenal nodule has Hounsfield unit  measurements consistent with an adenoma. The unenhanced solid  abdominal organs are otherwise unremarkable. There is no  abdominal adenopathy. There is no free fluid or free air within  the abdomen. The abdominal portion of the GI tract is  unremarkable.    Pelvis: There is a moderate-sized umbilical hernia containing  only fat. Meier catheter extends into the decompressed bladder.  There is no free fluid in the pelvis. There is no pelvic  adenopathy. There is mild diverticulosis. The pelvic portion of  the GI tract is otherwise unremarkable. Degenerative changes are  noted in the spine and hips.      Impression:       CONCLUSION:   1. Bilateral pleural effusions with bibasilar atelectasis and  likely component of pneumonia at least in the right lower lobe.  2. Right ureteral stent in good position with continued right  nephrolithiasis without hydronephrosis or ureteral stone.  3. Moderate-sized umbilical hernia containing fat.  4. Mild diverticulosis.    Electronically signed by:  Benjamín Roy  4/10/2017 9:32 PM  CDT Workstation: [id number]                 MEDICATIONS      atorvastatin 80 mg Oral Nightly   clopidogrel 75 mg Oral Daily   ferrous sulfate 324 mg Oral BID With Meals   gabapentin 100 mg Oral Nightly   heparin (porcine) 5,000 Units Subcutaneous Q8H   hydrALAZINE 25 mg Oral BID   insulin aspart 0-7 Units Subcutaneous 4x Daily AC & at Bedtime   magic butt ointment  Topical BID   metoprolol tartrate 25 mg Oral Q12H   pantoprazole 40 mg Oral Daily       dextrose 5 % and sodium chloride 0.45 % 30 mL/hr       Assessment/Plan   ASSESSMENT / PLAN    Principal Problem:    NSTEMI (non-ST elevated myocardial  infarction)  Active Problems:    Type 2 diabetes mellitus    Osteoarthritis of multiple joints    Essential hypertension    Bacterial pneumonia    Morbid obesity with BMI of 50.0-59.9, adult    Acute cystitis    Heart failure with preserved ejection fraction, borderline, class III    Pulmonary hypertension    Anemia    Assessment  1.  Acute kidney injury/ATN : non oliguric.  S/p dialysis yesterday, morning labs pending. on ckd 3/4 prior cr came down to 2.6.  2.  Non-ST elevation myocardial infarction.  3.  Multifactorial anemia, currently on oral ferrous sulfate. S/p 2 Units 4/11/17, Hb 10.2 from 8.9 yesterday.  Fecal occult positive, patient denies seeing any blood in stool although he is having loose stools.  Patient had colonoscopy done by Dr Green (Nov 2016) which showed multiple polyps, including 15 mm semi-penduculated polyp 100 cm proximal to anus.  Patient was actually supposed to have outpatient repeat colonoscopy today to remove polyp.  4.  HFpEF, FC-III, Stage C.  5.  Nephrolithiasis s/p cysto, bilateral retrograde, right ureteroscopy, lithotripsy with stent placement, bladder stone lithotripsy 3/29/17. Repeat CT scan showed large right renal pelvis stone, smaller stones but no hydro. Urology says no urological intervention at this time, until medically stable.  6. E.fecalis uti finish the course of zyvox  Plan  1.  Appears hypervolemic on exam and cxr. RHC showed elevated PCWP (21 mm Hg). Clinically improving with HD.  HD again Friday. We will attempt diuretics today, although will be cautious as his blood pressure has been borderline. Will try bumex 2mg bid. Check fe studies and may need SADIE           This document has been electronically signed by Gentry Subramanian MD on April 13, 2017 9:04 AM      I discussed the patients findings and my recommendations with patient and family   I have reviewed the case with the resident. I have also examined and seen the patient. I agree with the assessment and  plan as documented in the resident's note.    Alanna Brand MD  04/13/17  9:04 AM

## 2017-04-14 ENCOUNTER — HOSPITAL ENCOUNTER (OUTPATIENT)
Facility: HOSPITAL | Age: 62
End: 2017-04-16
Attending: INTERNAL MEDICINE | Admitting: INTERNAL MEDICINE

## 2017-04-14 VITALS
OXYGEN SATURATION: 90 % | WEIGHT: 315 LBS | TEMPERATURE: 98 F | HEART RATE: 80 BPM | RESPIRATION RATE: 20 BRPM | SYSTOLIC BLOOD PRESSURE: 132 MMHG | HEIGHT: 73 IN | BODY MASS INDEX: 41.75 KG/M2 | DIASTOLIC BLOOD PRESSURE: 66 MMHG

## 2017-04-14 DIAGNOSIS — Z74.09 IMPAIRED PHYSICAL MOBILITY: ICD-10-CM

## 2017-04-14 DIAGNOSIS — K63.5 COLON POLYPS: ICD-10-CM

## 2017-04-14 LAB
ALBUMIN SERPL-MCNC: 2.9 G/DL (ref 3.4–4.8)
ALBUMIN SERPL-MCNC: 3 G/DL (ref 3.4–4.8)
ALBUMIN/GLOB SERPL: 1 G/DL (ref 1.1–1.8)
ALBUMIN/GLOB SERPL: 1.2 G/DL (ref 1.1–1.8)
ALP SERPL-CCNC: 117 U/L (ref 38–126)
ALP SERPL-CCNC: 91 U/L (ref 38–126)
ALT SERPL W P-5'-P-CCNC: 17 U/L (ref 21–72)
ALT SERPL W P-5'-P-CCNC: 19 U/L (ref 21–72)
ANION GAP SERPL CALCULATED.3IONS-SCNC: 14 MMOL/L (ref 5–15)
ANION GAP SERPL CALCULATED.3IONS-SCNC: 15 MMOL/L (ref 5–15)
AST SERPL-CCNC: 20 U/L (ref 17–59)
AST SERPL-CCNC: 27 U/L (ref 17–59)
BASOPHILS # BLD AUTO: 0.03 10*3/MM3 (ref 0–0.2)
BASOPHILS # BLD AUTO: 0.05 10*3/MM3 (ref 0–0.2)
BASOPHILS NFR BLD AUTO: 0.2 % (ref 0–2)
BASOPHILS NFR BLD AUTO: 0.4 % (ref 0–2)
BILIRUB SERPL-MCNC: 0.2 MG/DL (ref 0.2–1.3)
BILIRUB SERPL-MCNC: 0.3 MG/DL (ref 0.2–1.3)
BUN BLD-MCNC: 36 MG/DL (ref 7–21)
BUN BLD-MCNC: 61 MG/DL (ref 7–21)
BUN/CREAT SERPL: 11.7 (ref 7–25)
BUN/CREAT SERPL: 12 (ref 7–25)
CALCIUM SPEC-SCNC: 8.4 MG/DL (ref 8.4–10.2)
CALCIUM SPEC-SCNC: 8.4 MG/DL (ref 8.4–10.2)
CHLORIDE SERPL-SCNC: 101 MMOL/L (ref 95–110)
CHLORIDE SERPL-SCNC: 95 MMOL/L (ref 95–110)
CO2 SERPL-SCNC: 23 MMOL/L (ref 22–31)
CO2 SERPL-SCNC: 25 MMOL/L (ref 22–31)
CREAT BLD-MCNC: 3.09 MG/DL (ref 0.7–1.3)
CREAT BLD-MCNC: 5.09 MG/DL (ref 0.7–1.3)
DEPRECATED RDW RBC AUTO: 42.8 FL (ref 35.1–43.9)
DEPRECATED RDW RBC AUTO: 44.2 FL (ref 35.1–43.9)
EOSINOPHIL # BLD AUTO: 0.31 10*3/MM3 (ref 0–0.7)
EOSINOPHIL # BLD AUTO: 0.39 10*3/MM3 (ref 0–0.7)
EOSINOPHIL NFR BLD AUTO: 2.4 % (ref 0–7)
EOSINOPHIL NFR BLD AUTO: 3 % (ref 0–7)
ERYTHROCYTE [DISTWIDTH] IN BLOOD BY AUTOMATED COUNT: 15.6 % (ref 11.5–14.5)
ERYTHROCYTE [DISTWIDTH] IN BLOOD BY AUTOMATED COUNT: 15.8 % (ref 11.5–14.5)
GFR SERPL CREATININE-BSD FRML MDRD: 12 ML/MIN/1.73 (ref 60–113)
GFR SERPL CREATININE-BSD FRML MDRD: 21 ML/MIN/1.73 (ref 60–113)
GLOBULIN UR ELPH-MCNC: 2.6 GM/DL (ref 2.3–3.5)
GLOBULIN UR ELPH-MCNC: 2.8 GM/DL (ref 2.3–3.5)
GLUCOSE BLD-MCNC: 127 MG/DL (ref 60–100)
GLUCOSE BLD-MCNC: 183 MG/DL (ref 60–100)
GLUCOSE BLDC GLUCOMTR-MCNC: 154 MG/DL (ref 70–130)
HCT VFR BLD AUTO: 30.8 % (ref 39–49)
HCT VFR BLD AUTO: 32.6 % (ref 39–49)
HGB BLD-MCNC: 10.8 G/DL (ref 13.7–17.3)
HGB BLD-MCNC: 9.8 G/DL (ref 13.7–17.3)
IMM GRANULOCYTES # BLD: 0.09 10*3/MM3 (ref 0–0.02)
IMM GRANULOCYTES # BLD: 0.13 10*3/MM3 (ref 0–0.02)
IMM GRANULOCYTES NFR BLD: 0.7 % (ref 0–0.5)
IMM GRANULOCYTES NFR BLD: 1 % (ref 0–0.5)
LYMPHOCYTES # BLD AUTO: 0.68 10*3/MM3 (ref 0.6–4.2)
LYMPHOCYTES # BLD AUTO: 0.74 10*3/MM3 (ref 0.6–4.2)
LYMPHOCYTES NFR BLD AUTO: 5.2 % (ref 10–50)
LYMPHOCYTES NFR BLD AUTO: 5.6 % (ref 10–50)
MCH RBC QN AUTO: 24.4 PG (ref 26.5–34)
MCH RBC QN AUTO: 25.1 PG (ref 26.5–34)
MCHC RBC AUTO-ENTMCNC: 31.8 G/DL (ref 31.5–36.3)
MCHC RBC AUTO-ENTMCNC: 33.1 G/DL (ref 31.5–36.3)
MCV RBC AUTO: 75.6 FL (ref 80–98)
MCV RBC AUTO: 76.8 FL (ref 80–98)
MONOCYTES # BLD AUTO: 1.21 10*3/MM3 (ref 0–0.9)
MONOCYTES # BLD AUTO: 1.63 10*3/MM3 (ref 0–0.9)
MONOCYTES NFR BLD AUTO: 12.4 % (ref 0–12)
MONOCYTES NFR BLD AUTO: 9.2 % (ref 0–12)
NEUTROPHILS # BLD AUTO: 10.23 10*3/MM3 (ref 2–8.6)
NEUTROPHILS # BLD AUTO: 10.76 10*3/MM3 (ref 2–8.6)
NEUTROPHILS NFR BLD AUTO: 77.9 % (ref 37–80)
NEUTROPHILS NFR BLD AUTO: 82 % (ref 37–80)
PLATELET # BLD AUTO: 261 10*3/MM3 (ref 150–450)
PLATELET # BLD AUTO: 279 10*3/MM3 (ref 150–450)
PMV BLD AUTO: 8.2 FL (ref 8–12)
PMV BLD AUTO: 8.4 FL (ref 8–12)
POTASSIUM BLD-SCNC: 3.9 MMOL/L (ref 3.5–5.1)
POTASSIUM BLD-SCNC: 4 MMOL/L (ref 3.5–5.1)
PROT SERPL-MCNC: 5.6 G/DL (ref 6.3–8.6)
PROT SERPL-MCNC: 5.7 G/DL (ref 6.3–8.6)
RBC # BLD AUTO: 4.01 10*6/MM3 (ref 4.37–5.74)
RBC # BLD AUTO: 4.31 10*6/MM3 (ref 4.37–5.74)
SODIUM BLD-SCNC: 135 MMOL/L (ref 137–145)
SODIUM BLD-SCNC: 138 MMOL/L (ref 137–145)
WBC NRBC COR # BLD: 13.12 10*3/MM3 (ref 3.2–9.8)
WBC NRBC COR # BLD: 13.13 10*3/MM3 (ref 3.2–9.8)

## 2017-04-14 PROCEDURE — 25010000002 HEPARIN (PORCINE) PER 1000 UNITS: Performed by: INTERNAL MEDICINE

## 2017-04-14 PROCEDURE — 63710000001 INSULIN ASPART PER 5 UNITS: Performed by: INTERNAL MEDICINE

## 2017-04-14 PROCEDURE — 99232 SBSQ HOSP IP/OBS MODERATE 35: CPT | Performed by: INTERNAL MEDICINE

## 2017-04-14 PROCEDURE — 87040 BLOOD CULTURE FOR BACTERIA: CPT | Performed by: INTERNAL MEDICINE

## 2017-04-14 PROCEDURE — 94799 UNLISTED PULMONARY SVC/PX: CPT

## 2017-04-14 PROCEDURE — 82962 GLUCOSE BLOOD TEST: CPT

## 2017-04-14 PROCEDURE — 63510000001 EPOETIN ALFA PER 1000 UNITS: Performed by: INTERNAL MEDICINE

## 2017-04-14 PROCEDURE — 99238 HOSP IP/OBS DSCHRG MGMT 30/<: CPT | Performed by: FAMILY MEDICINE

## 2017-04-14 PROCEDURE — 80053 COMPREHEN METABOLIC PANEL: CPT | Performed by: FAMILY MEDICINE

## 2017-04-14 PROCEDURE — 85025 COMPLETE CBC W/AUTO DIFF WBC: CPT | Performed by: FAMILY MEDICINE

## 2017-04-14 PROCEDURE — 94760 N-INVAS EAR/PLS OXIMETRY 1: CPT

## 2017-04-14 PROCEDURE — 63710000001 INSULIN ASPART PER 5 UNITS: Performed by: FAMILY MEDICINE

## 2017-04-14 PROCEDURE — 80053 COMPREHEN METABOLIC PANEL: CPT | Performed by: INTERNAL MEDICINE

## 2017-04-14 PROCEDURE — 85025 COMPLETE CBC W/AUTO DIFF WBC: CPT | Performed by: INTERNAL MEDICINE

## 2017-04-14 RX ORDER — HEPARIN SODIUM 5000 [USP'U]/ML
5000 INJECTION, SOLUTION INTRAVENOUS; SUBCUTANEOUS EVERY 8 HOURS SCHEDULED
Status: DISCONTINUED | OUTPATIENT
Start: 2017-04-14 | End: 2017-04-16 | Stop reason: HOSPADM

## 2017-04-14 RX ORDER — NICOTINE POLACRILEX 4 MG
15 LOZENGE BUCCAL
Status: DISCONTINUED | OUTPATIENT
Start: 2017-04-14 | End: 2017-04-16 | Stop reason: HOSPADM

## 2017-04-14 RX ORDER — ECHINACEA PURPUREA EXTRACT 125 MG
1 TABLET ORAL AS NEEDED
Qty: 12 ML | Refills: 12 | Status: SHIPPED | OUTPATIENT
Start: 2017-04-14

## 2017-04-14 RX ORDER — HEPARIN SODIUM 1000 [USP'U]/ML
2000 INJECTION, SOLUTION INTRAVENOUS; SUBCUTANEOUS AS NEEDED
Status: DISCONTINUED | OUTPATIENT
Start: 2017-04-14 | End: 2017-04-16 | Stop reason: HOSPADM

## 2017-04-14 RX ORDER — SODIUM CHLORIDE 0.9 % (FLUSH) 0.9 %
1-10 SYRINGE (ML) INJECTION EVERY 12 HOURS SCHEDULED
Status: DISCONTINUED | OUTPATIENT
Start: 2017-04-14 | End: 2017-04-16 | Stop reason: HOSPADM

## 2017-04-14 RX ORDER — CLOPIDOGREL BISULFATE 75 MG/1
75 TABLET ORAL DAILY
Status: DISCONTINUED | OUTPATIENT
Start: 2017-04-14 | End: 2017-04-16 | Stop reason: HOSPADM

## 2017-04-14 RX ORDER — DEXTROSE AND SODIUM CHLORIDE 5; .45 G/100ML; G/100ML
30 INJECTION, SOLUTION INTRAVENOUS CONTINUOUS PRN
Status: DISCONTINUED | OUTPATIENT
Start: 2017-04-14 | End: 2017-04-15

## 2017-04-14 RX ORDER — HYDROCODONE BITARTRATE AND ACETAMINOPHEN 10; 325 MG/1; MG/1
1 TABLET ORAL EVERY 6 HOURS PRN
Status: DISCONTINUED | OUTPATIENT
Start: 2017-04-14 | End: 2017-04-16 | Stop reason: HOSPADM

## 2017-04-14 RX ORDER — SODIUM CHLORIDE 0.9 % (FLUSH) 0.9 %
1-10 SYRINGE (ML) INJECTION AS NEEDED
Status: DISCONTINUED | OUTPATIENT
Start: 2017-04-14 | End: 2017-04-16 | Stop reason: HOSPADM

## 2017-04-14 RX ORDER — DEXTROSE MONOHYDRATE 25 G/50ML
25 INJECTION, SOLUTION INTRAVENOUS
Qty: 1000 ML | Refills: 0
Start: 2017-04-14 | End: 2017-05-14

## 2017-04-14 RX ORDER — ECHINACEA PURPUREA EXTRACT 125 MG
1 TABLET ORAL AS NEEDED
Status: DISCONTINUED | OUTPATIENT
Start: 2017-04-14 | End: 2017-04-16 | Stop reason: HOSPADM

## 2017-04-14 RX ORDER — ALBUMIN (HUMAN) 12.5 G/50ML
12.5 SOLUTION INTRAVENOUS AS NEEDED
Status: ACTIVE | OUTPATIENT
Start: 2017-04-14 | End: 2017-04-14

## 2017-04-14 RX ORDER — DEXTROSE MONOHYDRATE 25 G/50ML
25 INJECTION, SOLUTION INTRAVENOUS
Status: DISCONTINUED | OUTPATIENT
Start: 2017-04-14 | End: 2017-04-16 | Stop reason: HOSPADM

## 2017-04-14 RX ORDER — SODIUM CHLORIDE 0.9 % (FLUSH) 0.9 %
1-10 SYRINGE (ML) INJECTION AS NEEDED
Qty: 125 ML | Refills: 3
Start: 2017-04-14

## 2017-04-14 RX ORDER — NICOTINE POLACRILEX 4 MG
15 LOZENGE BUCCAL
Qty: 30 EACH | Refills: 0
Start: 2017-04-14

## 2017-04-14 RX ORDER — IPRATROPIUM BROMIDE AND ALBUTEROL SULFATE 2.5; .5 MG/3ML; MG/3ML
3 SOLUTION RESPIRATORY (INHALATION) EVERY 4 HOURS PRN
Status: DISCONTINUED | OUTPATIENT
Start: 2017-04-14 | End: 2017-04-16 | Stop reason: HOSPADM

## 2017-04-14 RX ORDER — HEPARIN SODIUM 1000 [USP'U]/ML
2000 INJECTION, SOLUTION INTRAVENOUS; SUBCUTANEOUS AS NEEDED
Status: DISCONTINUED | OUTPATIENT
Start: 2017-04-14 | End: 2017-04-14 | Stop reason: HOSPADM

## 2017-04-14 RX ORDER — CLOPIDOGREL BISULFATE 75 MG/1
75 TABLET ORAL DAILY
Qty: 30 TABLET | Refills: 0 | Status: SHIPPED | OUTPATIENT
Start: 2017-04-14

## 2017-04-14 RX ORDER — GABAPENTIN 100 MG/1
100 CAPSULE ORAL NIGHTLY
Status: DISCONTINUED | OUTPATIENT
Start: 2017-04-14 | End: 2017-04-16 | Stop reason: HOSPADM

## 2017-04-14 RX ORDER — DEXTROSE AND SODIUM CHLORIDE 5; .45 G/100ML; G/100ML
30 INJECTION, SOLUTION INTRAVENOUS CONTINUOUS
Qty: 1000 ML | Refills: 0
Start: 2017-04-14 | End: 2017-05-14

## 2017-04-14 RX ORDER — IPRATROPIUM BROMIDE AND ALBUTEROL SULFATE 2.5; .5 MG/3ML; MG/3ML
3 SOLUTION RESPIRATORY (INHALATION) EVERY 4 HOURS PRN
Qty: 360 ML | Refills: 0
Start: 2017-04-14

## 2017-04-14 RX ORDER — SODIUM CHLORIDE 0.9 % (FLUSH) 0.9 %
1-10 SYRINGE (ML) INJECTION AS NEEDED
Qty: 10 ML | Refills: 0
Start: 2017-04-14

## 2017-04-14 RX ORDER — ALBUMIN (HUMAN) 12.5 G/50ML
12.5 SOLUTION INTRAVENOUS AS NEEDED
Qty: 10 ML | Refills: 0
Start: 2017-04-14

## 2017-04-14 RX ORDER — ALBUMIN (HUMAN) 12.5 G/50ML
12.5 SOLUTION INTRAVENOUS AS NEEDED
Status: DISCONTINUED | OUTPATIENT
Start: 2017-04-14 | End: 2017-04-14 | Stop reason: HOSPADM

## 2017-04-14 RX ORDER — BUMETANIDE 0.25 MG/ML
2 INJECTION INTRAMUSCULAR; INTRAVENOUS 2 TIMES DAILY
Status: DISCONTINUED | OUTPATIENT
Start: 2017-04-14 | End: 2017-04-16 | Stop reason: HOSPADM

## 2017-04-14 RX ORDER — HEPARIN SODIUM 1000 [USP'U]/ML
2000 INJECTION, SOLUTION INTRAVENOUS; SUBCUTANEOUS AS NEEDED
Qty: 10 ML | Refills: 0
Start: 2017-04-14

## 2017-04-14 RX ORDER — BUMETANIDE 0.25 MG/ML
2 INJECTION INTRAMUSCULAR; INTRAVENOUS 2 TIMES DAILY
Status: DISCONTINUED | OUTPATIENT
Start: 2017-04-15 | End: 2017-04-14 | Stop reason: HOSPADM

## 2017-04-14 RX ORDER — BUMETANIDE 0.25 MG/ML
2 INJECTION INTRAMUSCULAR; INTRAVENOUS 2 TIMES DAILY
Qty: 8 ML | Refills: 0
Start: 2017-04-15

## 2017-04-14 RX ORDER — HEPARIN SODIUM 5000 [USP'U]/ML
5000 INJECTION, SOLUTION INTRAVENOUS; SUBCUTANEOUS EVERY 8 HOURS SCHEDULED
Qty: 10 ML | Refills: 0
Start: 2017-04-14

## 2017-04-14 RX ORDER — ATORVASTATIN CALCIUM 80 MG/1
80 TABLET, FILM COATED ORAL NIGHTLY
Qty: 30 TABLET | Refills: 0
Start: 2017-04-14

## 2017-04-14 RX ORDER — FERROUS SULFATE TAB EC 324 MG (65 MG FE EQUIVALENT) 324 (65 FE) MG
324 TABLET DELAYED RESPONSE ORAL 2 TIMES DAILY WITH MEALS
Status: DISCONTINUED | OUTPATIENT
Start: 2017-04-15 | End: 2017-04-16 | Stop reason: HOSPADM

## 2017-04-14 RX ORDER — FERROUS SULFATE TAB EC 324 MG (65 MG FE EQUIVALENT) 324 (65 FE) MG
324 TABLET DELAYED RESPONSE ORAL 2 TIMES DAILY WITH MEALS
Qty: 30 TABLET | Refills: 0 | Status: SHIPPED | OUTPATIENT
Start: 2017-04-14

## 2017-04-14 RX ORDER — PANTOPRAZOLE SODIUM 40 MG/1
40 TABLET, DELAYED RELEASE ORAL DAILY
Status: DISCONTINUED | OUTPATIENT
Start: 2017-04-14 | End: 2017-04-16 | Stop reason: HOSPADM

## 2017-04-14 RX ORDER — ATORVASTATIN CALCIUM 40 MG/1
80 TABLET, FILM COATED ORAL NIGHTLY
Status: DISCONTINUED | OUTPATIENT
Start: 2017-04-14 | End: 2017-04-16 | Stop reason: HOSPADM

## 2017-04-14 RX ADMIN — ERYTHROPOIETIN 5000 UNITS: 10000 INJECTION, SOLUTION INTRAVENOUS; SUBCUTANEOUS at 12:09

## 2017-04-14 RX ADMIN — Medication: at 18:11

## 2017-04-14 RX ADMIN — INSULIN ASPART 2 UNITS: 100 INJECTION, SOLUTION INTRAVENOUS; SUBCUTANEOUS at 18:11

## 2017-04-14 RX ADMIN — HYDROCODONE BITARTRATE AND ACETAMINOPHEN 1 TABLET: 10; 325 TABLET ORAL at 05:39

## 2017-04-14 RX ADMIN — HEPARIN SODIUM 5000 UNITS: 5000 INJECTION, SOLUTION INTRAVENOUS; SUBCUTANEOUS at 05:38

## 2017-04-14 RX ADMIN — FERROUS SULFATE TAB EC 324 MG (65 MG FE EQUIVALENT) 324 MG: 324 (65 FE) TABLET DELAYED RESPONSE at 18:10

## 2017-04-14 RX ADMIN — HEPARIN SODIUM 2000 UNITS: 1000 INJECTION, SOLUTION INTRAVENOUS; SUBCUTANEOUS at 10:12

## 2017-04-14 RX ADMIN — Medication 10 ML: at 14:30

## 2017-04-14 NOTE — THERAPY DISCHARGE NOTE
Acute Care - Physical Therapy Discharge Summary  [facility]       Patient Name: Blake Chavez  : 1955  MRN: [id number]    Today's Date: 2017  Onset of Illness/Injury or Date of Surgery Date: 17    Date of Referral to PT: 17  Referring Physician: Dr. Fu      Admit Date: 2017      PT Recommendation and Plan    Visit Dx:    ICD-10-CM ICD-9-CM   1. Pulmonary hypertension I27.2 416.8   2. Colon polyps K63.5 211.3   3. Impaired physical mobility Z74.09 781.99             Outcome Measures       17 1014          How much help from another person do you currently need...    Turning from your back to your side while in flat bed without using bedrails? 2  -CZ      Moving from lying on back to sitting on the side of a flat bed without bedrails? 2  -CZ      Moving to and from a bed to a chair (including a wheelchair)? 3  -CZ      Standing up from a chair using your arms (e.g., wheelchair, bedside chair)? 3  -CZ      Climbing 3-5 steps with a railing? 2  -CZ      To walk in hospital room? 2  -CZ      AM-PAC 6 Clicks Score 14  -CZ      Functional Assessment    Outcome Measure Options AM-PAC 6 Clicks Basic Mobility (PT)  -CZ        User Key  (r) = Recorded By, (t) = Taken By, (c) = Cosigned By    Initials Name Provider Type    AVIS Corona, PT Physical Therapist                      IP PT Goals       17 1440 17 1014       Bed Mobility PT STG    Bed Mobility PT STG, Date Established  17  -CZ     Bed Mobility PT STG, Time to Achieve  5 - 7 days  -CZ     Bed Mobility PT STG, Activity Type  all bed mobility  -CZ     Bed Mobility PT STG, Bruce Level  supervision required  -CZ     Bed Mobility PT STG, Additional Goal  May elevate HOB  -CZ     Bed Mobility PT STG, Date Goal Reviewed 17  -AM 17  -CZ     Bed Mobility PT STG, Outcome goal not met  -AM      Bed Mobility PT STG, Reason Goal Not Met discharged from facility  -AM      Transfer  Training PT STG    Transfer Training PT STG, Date Established  04/13/17  -CZ     Transfer Training PT STG, Time to Achieve  5 - 7 days  -CZ     Transfer Training PT STG, Activity Type  all transfers  -CZ     Transfer Training PT STG, Grady Level  supervision required  -CZ     Transfer Training PT STG, Assist Device  walker, rolling  -CZ     Transfer Training PT STG, Date Goal Reviewed 04/14/17  -AM 04/13/17  -CZ     Transfer Training PT STG, Outcome goal not met  -AM      Transfer Training PT STG, Reason Goal Not Met discharged from facility  -AM      Gait Training PT LTG    Gait Training Goal PT LTG, Date Established  04/13/17  -CZ     Gait Training Goal PT LTG, Time to Achieve  by discharge  -CZ     Gait Training Goal PT LTG, Grady Level  supervision required  -CZ     Gait Training Goal PT LTG, Assist Device  walker, rolling  -CZ     Gait Training Goal PT LTG, Distance to Achieve  25'x1  -CZ     Gait Training Goal PT LTG, Date Goal Reviewed 04/14/17  -AM 04/13/17  -CZ     Gait Training Goal PT LTG, Outcome goal not met  -AM      Gait Training Goal PT LTG, Reason Goal Not Met discharged from facility  -AM        User Key  (r) = Recorded By, (t) = Taken By, (c) = Cosigned By    Initials Name Provider Type    AM Ben Avitia PTA Physical Therapy Assistant    AVIS Corona, PT Physical Therapist              PT Discharge Summary  Anticipated Discharge Disposition: long term acute care facility  Reason for Discharge: Discharge from facility, Per MD order  Outcomes Achieved: Unable to make functional progress toward goals at this time  Discharge Destination: [facility]      Ben Avitia PTA   4/14/2017

## 2017-04-14 NOTE — PROGRESS NOTES
[address] [address]  T - [id number]         PROGRESS NOTE         SUBJECTIVE:   Patient Care Team:  Himanshu Tovar MD as PCP - General (Family Medicine)    Chief Complaint:   No chief complaint on file.      Subjective     Patient is 61 y.o. male presents with weakness. His Creatinine is worsening. Urine output is 1250     ROS/HISTORY/ CURRENT MEDICATIONS/OBJECTIVE/VS/PE:   Review of Systems:   Review of Systems    History:     Past Medical History:   Diagnosis Date   • Acute cystitis    • BPH (benign prostatic hypertrophy)    • Calculus of kidney and ureter     recently passed      • Chest pain    • Crohn's disease    • Edema     unspecified - bilateral lower extremities     • Essential hypertension    • Hip pain    • Hypertensive disorder    • Knee pain    • Morbid obesity    • Nausea    • Nuclear senile cataract    • Osteoarthritis of multiple joints    • Paraumbilical hernia    • Right hand fracture     as a teen   • Severe concentric left ventricular hypertrophy    • Type 2 diabetes mellitus      no BDR    • Type 2 diabetes mellitus      no BDR      Past Surgical History:   Procedure Laterality Date   • CARDIAC CATHETERIZATION N/A 4/10/2017    Procedure: Right Heart Cath;  Surgeon: Bharath Dobbins MD PhD;  Location: [facility] CATH INVASIVE LOCATION;  Service:    • COLONOSCOPY  ; 11/03/0216    Diverticulosis in sigmoid colon. 1 polyp in sigmoid colon; removed by cold biopsy polypectomy. Hemorrhoids found   • CYSTOSCOPY, URETEROSCOPY, RETROGRADE PYELOGRAM, STENT INSERTION Bilateral 3/29/2017    Procedure: CYSTOSCOPY, BILATERAL RETROGRADE, URETEROSCOPY, LASER LITHOTRIPSY, STENT PLACEMENT; LASER LITHOTRIPSY BLADDER CALCULI;  Surgeon: Blake Lopez MD;  Location: [facility] OR;  Service:    • HYDROCELE EXCISION / REPAIR     • INJECTION OF MEDICATION  04/18/2016    Kenalog   • UPPER GASTROINTESTINAL ENDOSCOPY  11/03/2016     Family History   Problem Relation  Age of Onset   • Cancer Other    • Diabetes Other    • Stroke Other    • Other Other      Colon problems    • Diabetes Mother    • Heart failure Mother    • Colon cancer Father    • Breast cancer Paternal Grandmother      Social History   Substance Use Topics   • Smoking status: Never Smoker   • Smokeless tobacco: Never Used   • Alcohol use No     Prescriptions Prior to Admission   Medication Sig Dispense Refill Last Dose   • diltiaZEM (TIAZAC) 360 MG 24 hr capsule TAKE 1 CAPSULE EVERY DAY 30 capsule 5 3/29/2017 at 1000   • FE BISGLY-FE [id number] PO Take  by mouth Daily.   3/28/2017 at 2200   • gabapentin (NEURONTIN) 100 MG capsule TAKE 1 CAPSULE AT BEDTIME FOR FOOT PAIN 30 capsule 5 3/28/2017 at 2200   • Glucosamine 500 MG capsule Take 500 mg by mouth 2 (Two) Times a Day.   4/5/2017 at Unknown time   • glyBURIDE (DIAbeta) 5 MG tablet TAKE 2 TABLETS TWICE DAILY BEFORE MEALS (Patient taking differently: TAKE 2 TABLETS BID) 120 tablet 5 3/28/2017 at 2200   • hydrALAZINE (APRESOLINE) 25 MG tablet Take 1 tablet by mouth 3 (Three) Times a Day. (Patient taking differently: Take 25 mg by mouth 2 (Two) Times a Day.) 90 tablet 3 3/29/2017 at 1000   • lisinopril (PRINIVIL,ZESTRIL) 20 MG tablet TAKE 1 TABLET BY MOUTH EVERY DAY FOR BLOOD PRESSURE 30 tablet 5 3/28/2017 at 1000   • nateglinide (STARLIX) 120 MG tablet TAKE 1 TABLET BY MOUTH BEFORE MEALS (Patient taking differently: daily) 90 tablet 3 3/28/2017 at 1000   • Omega-3 Fatty Acids (FISH OIL) 1000 MG capsule capsule Take 1,000 mg by mouth Daily With Breakfast.   3/28/2017 at 2200   • ONE TOUCH ULTRA TEST test strip USE THREE TIMES DAILY 100 each 11 Taking   • POLY-IRON 150 150 MG capsule TAKE 1 CAPSULE TWICE DAILY 60 capsule 3 3/28/2017 at 2200   • pravastatin (PRAVACHOL) 40 MG tablet Take 1 tablet by mouth Daily. 31 tablet 6 3/28/2017 at 1000   • promethazine (PHENERGAN) 12.5 MG tablet Take 1 tablet by mouth Every 6 (Six) Hours As Needed (prn). 30 tablet 1 More  than a month at Unknown time   • SITagliptin (JANUVIA) 100 MG tablet Take 1 tablet by mouth Daily. (Patient taking differently: Take 50 mg by mouth Daily.) 30 tablet 5 3/28/2017 at 1000   • VITAMIN D, ERGOCALCIFEROL, PO Take  by mouth Daily.   3/28/2017 at 1000   • amoxicillin-clavulanate (AUGMENTIN) 875-125 MG per tablet Take 1 tablet by mouth 2 (Two) Times a Day for 10 days. 20 tablet 0    • [] doxycycline (VIBRAMYCIN) 100 MG capsule Take 1 capsule by mouth Daily for 7 days. 7 capsule 0    • HYDROcodone-acetaminophen (NORCO)  MG per tablet Take 1 tablet by mouth Every 6 (Six) Hours As Needed for Moderate Pain (4-6).      • omeprazole (priLOSEC) 40 MG capsule Take 40 mg by mouth Daily.   3/29/2017 at 1000   • oxyCODONE-acetaminophen (PERCOCET) 7.5-325 MG per tablet Take 1-2 tablets by mouth Every 4 (Four) Hours As Needed (Pain). 15 tablet 0    • pantoprazole (PROTONIX) 40 MG EC tablet Take 40 mg by mouth Daily.   not started     Allergies:  Review of patient's allergies indicates no known allergies.    Current Medications:     Current Facility-Administered Medications   Medication Dose Route Frequency Provider Last Rate Last Dose   • albumin human 25 % IV SOLN 12.5 g  12.5 g Intravenous PRN Alanna Brand MD       • atorvastatin (LIPITOR) tablet 80 mg  80 mg Oral Nightly Bharath Dobbins MD PhD   80 mg at 17 7098   • [START ON 4/15/2017] bumetanide (BUMEX) injection 2 mg  2 mg Intravenous BID Alanna Brand MD       • clopidogrel (PLAVIX) tablet 75 mg  75 mg Oral Daily Bharath Dobbins MD PhD   75 mg at 17 0909   • dextrose (D50W) solution 25 g  25 g Intravenous Q15 Min PRN Hayley Valderraam MD       • dextrose (GLUTOSE) oral gel 15 g  15 g Oral Q15 Min PRN Hayley Valderrama MD       • dextrose 5 % and sodium chloride 0.45 % infusion  30 mL/hr Intravenous Continuous PRN Nestor Green MD       • epoetin gregory (EPOGEN,PROCRIT) injection 5,000 Units  5,000 Units Subcutaneous Once  Alanna Brand MD       • ferrous sulfate EC tablet 324 mg  324 mg Oral BID With Meals Bharath Dobbins MD PhD   324 mg at 04/13/17 1710   • gabapentin (NEURONTIN) capsule 100 mg  100 mg Oral Nightly Hayley Valderrama MD   100 mg at 04/13/17 2228   • glucagon (human recombinant) (GLUCAGEN DIAGNOSTIC) injection 1 mg  1 mg Subcutaneous Q15 Min PRN Hayley Valderrama MD       • heparin (porcine) 5000 UNIT/ML injection 5,000 Units  5,000 Units Subcutaneous Q8H Bharath Dobbins MD PhD   5,000 Units at 04/14/17 0538   • heparin (porcine) injection 2,000 Units  2,000 Units Intravenous PRN Alanna Brand MD       • HYDROcodone-acetaminophen (NORCO)  MG per tablet 1 tablet  1 tablet Oral Q6H PRN Hayley Valderrama MD   1 tablet at 04/14/17 0539   • insulin aspart (novoLOG) injection 0-7 Units  0-7 Units Subcutaneous 4x Daily AC & at Bedtime Hayley Valderrama MD   2 Units at 04/13/17 2229   • ipratropium-albuterol (DUO-NEB) nebulizer solution 3 mL  3 mL Nebulization Q4H PRN Abby Fu MD       • magic butt ointment   Topical BID Hayley Valderrama MD       • metoprolol tartrate (LOPRESSOR) tablet 25 mg  25 mg Oral Q12H Bharath Dobbins MD PhD   25 mg at 04/13/17 2234   • pantoprazole (PROTONIX) EC tablet 40 mg  40 mg Oral Daily Hayley Valderrama MD   40 mg at 04/13/17 0909   • pneumococcal polysaccharide 23-valent (PNEUMOVAX-23) vaccine 0.5 mL  0.5 mL Intramuscular During Hospitalization Javed Childers DO       • sodium chloride (OCEAN) nasal spray 1 spray  1 spray Each Nare PRN Abby Fu MD       • sodium chloride 0.9 % bolus 1,000 mL  1,000 mL Intravenous PRN Alanna Brand MD       • sodium chloride 0.9 % flush 1-10 mL  1-10 mL Intravenous PRN Hayley Valderrama MD   10 mL at 04/13/17 0913   • sodium chloride 0.9 % flush 1-10 mL  1-10 mL Intravenous PRN Bharath Dobbins MD PhD   10 mL at 04/12/17 5746       Objective     Physical Exam:   Temp:  [96.8 °F (36 °C)-98.5 °F (36.9 °C)] 97.5 °F (36.4  °C)  Heart Rate:  [70-93] 70  Resp:  [18] 18  BP: (112-139)/(59-80) 128/60    Physical Exam   Constitutional: He appears well-developed and well-nourished.   HENT:   Head: Normocephalic and atraumatic.   Cardiovascular: Normal rate, regular rhythm and normal heart sounds.  Exam reveals no gallop and no friction rub.    No murmur heard.  Pulmonary/Chest: Effort normal and breath sounds normal. No respiratory distress. He has no wheezes. He has no rales.   Abdominal: Soft. Bowel sounds are normal. He exhibits no distension. There is no tenderness.   Skin: Skin is warm and dry.   Vitals reviewed.           Results Review:   Lab Results   Component Value Date    GLUCOSE 127 (H) 04/14/2017    BUN 61 (H) 04/14/2017    CREATININE 5.09 (H) 04/14/2017    EGFRIFNONA 12 (L) 04/14/2017    BCR 12.0 04/14/2017    CO2 23.0 04/14/2017    CALCIUM 8.4 04/14/2017    ALBUMIN 2.90 (L) 04/14/2017    LABIL2 1.0 (L) 04/14/2017    AST 27 04/14/2017    ALT 19 (L) 04/14/2017         WBC WBC   Date Value Ref Range Status   04/14/2017 13.13 (H) 3.20 - 9.80 10*3/mm3 Final   04/13/2017 14.99 (H) 3.20 - 9.80 10*3/mm3 Final   04/12/2017 13.93 (H) 3.20 - 9.80 10*3/mm3 Final      HGB Hemoglobin   Date Value Ref Range Status   04/14/2017 9.8 (L) 13.7 - 17.3 g/dL Final   04/13/2017 10.2 (L) 13.7 - 17.3 g/dL Final   04/12/2017 8.9 (L) 13.7 - 17.3 g/dL Final      HCT Hematocrit   Date Value Ref Range Status   04/14/2017 30.8 (L) 39.0 - 49.0 % Final   04/13/2017 31.8 (L) 39.0 - 49.0 % Final   04/12/2017 27.5 (L) 39.0 - 49.0 % Final      Platlets Platelets   Date Value Ref Range Status   04/14/2017 261 150 - 450 10*3/mm3 Final   04/13/2017 252 150 - 450 10*3/mm3 Final   04/12/2017 215 150 - 450 10*3/mm3 Final          Imaging Results (last 24 hours)     Procedure Component Value Units Date/Time    IR insert non-tunneled CVC 5+ [[id number]] Resulted:  04/13/17 1545     Updated:  04/13/17 1547    Narrative:       OPERATIVE NOTE  Blake Flores  Chavez  1955  * No dates entered *    PREOP DIAGNOSES:  Acute kidney injury requiring short term hemodialysis.    POSTOP DIAGNOSES:   Acute kidney injury requiring short term hemodialysis.    PROCEDURE:   Placement non-tunnelled central venous catheter (13Fr trialysis)  Vascular ultrasound guidance    SURGEON: BRENDA Chapman MD [facility]    ASSISTANT: Stephenie Muñoz RN     ANESTHESIA: local 1% lidocaine    COMPLICATIONS: none    DESCRIPTION OF OPERATION: In CCU, patient placed in supine position,   prepped and draped in sterile fashion.  Vascular ultrasound was used to   identify the right common femoral vein which was 10mm in diameter and   patent.  Cannulated via modified Seldinger technique with mini stick under   ultrasound guidance.  Exchanged for a 0.035 guidewire and serial dilators.    A 13Fr Trialysis catheter was placed, aspirated and flushed without   difficulty with Hep-Lock solution. catheter secured to the skin with 2-0   nylon suture.  Sterile dressing applied. patient tolerated procedure well.   dialysis aware and present to begin treatment.             This document has been electronically signed by Catarino Chapman MD   on April 13, 2017 3:46 PM                I reviewed the patient's new clinical results.  I reviewed the patient's new imaging results and agree with the interpretation.     ASSESSMENT/PLAN:   Assessment/Plan   Principal Problem:    NSTEMI (non-ST elevated myocardial infarction)  Active Problems:    Type 2 diabetes mellitus    Osteoarthritis of multiple joints    Essential hypertension    Bacterial pneumonia    Morbid obesity with BMI of 50.0-59.9, adult    Acute cystitis    Heart failure with preserved ejection fraction, borderline, class III    Pulmonary hypertension    Anemia      Continue with current treatment.  Will monitor his creatinine.  I discussed the patients findings and my recommendations with patient.      Himanshu Tovar MD  04/14/17  8:58 AM

## 2017-04-14 NOTE — PLAN OF CARE
Problem: Patient Care Overview (Adult)  Goal: Plan of Care Review  Outcome: Ongoing (interventions implemented as appropriate)    04/14/17 0432   Coping/Psychosocial Response Interventions   Plan Of Care Reviewed With patient   Patient Care Overview   Progress no change   Outcome Evaluation   Outcome Summary/Follow up Plan no changes, pt rested through the night       Goal: Adult Individualization and Mutuality  Outcome: Ongoing (interventions implemented as appropriate)  Goal: Discharge Needs Assessment  Outcome: Ongoing (interventions implemented as appropriate)    Problem: Fall Risk (Adult)  Goal: Absence of Falls  Outcome: Ongoing (interventions implemented as appropriate)    Problem: Respiratory Insufficiency (Adult)  Goal: Acid/Base Balance  Outcome: Ongoing (interventions implemented as appropriate)  Goal: Effective Ventilation  Outcome: Ongoing (interventions implemented as appropriate)

## 2017-04-14 NOTE — SIGNIFICANT NOTE
04/14/17 1440   Rehab Treatment   Discipline physical therapy assistant   Treatment Not Performed patient/family declined treatment  (Pt just returned from HD - hasn't ate lunch - going to d/c to [facility])

## 2017-04-14 NOTE — PROGRESS NOTES
"Cardiovascular Daily Progress Note  Bharath Dobbins M.D., Ph.D., [facility]      Subjective     Interval History:   Transferred to floor. Improved dyspnea.     Review of Systems - History obtained from chart review and the patient  Respiratory ROS: positive for - shortness of breath  Cardiovascular ROS: negative for - chest pain    Objective     Vital Sign Min/Max for last 24 hours  Temp  Min: 96.8 °F (36 °C)  Max: 98.5 °F (36.9 °C)   BP  Min: 103/57  Max: 139/80   Pulse  Min: 71  Max: 93   Resp  Min: 18  Max: 18   SpO2  Min: 91 %  Max: 99 %   Flow (L/min)  Min: 2  Max: 3   No Data Recorded     Flowsheet Rows         First Filed Value    Admission Height  72.99\" (185.4 cm) Documented at 04/06/2017 1542    Admission Weight  (!)  386 lb 6.4 oz (175 kg) Documented at 04/05/2017 1659        Last 3 weights    04/10/17  0518 04/10/17  1834 04/12/17  1714   Weight: (!) 394 lb (179 kg) (!) 394 lb 10 oz (179 kg) (!) 394 lb 10 oz (179 kg)     Body mass index is 52.58 kg/(m^2).    Physical Exam:   GEN: alert, appears stated age and cooperative wearing BiPAP  Body Habitus: morbidly obese  HEENT: Head: Normocephalic, no lesions, without obvious abnormality.  Neck / Thyroid: Supple, no masses, nodes, nodules or enlargement. No arcus senilis, xanthelasma or xanthomas.   JVP: 12 cm of water at 45 degrees HJR: Present    Carotid:  Upstroke: easily palpated bilaterally Volume: Normal.    Carotid Bruit:  None  Subclavian Bruit: Not present.    Chest:  Normal Excursion: Good    I:E: Good  Pulmonary:clear to auscultation, no wheezes, rales or rhonchi, symmetric air entry      Precordium:  No palpable heaves or thrusts. P2 is not palpable.   Mico:  normal size and placement Palpable S4: Not present.   Heart rate: normal  Heart Rhythm: regular     Heart Sounds: S1: normal intensity  S2: increased P2  S3: absent   S4: absent  Opening Snap: absent  A2-OS:  N/A  Pericardial rub: absent    Ejection click: None      Murmurs: Systolic: " none  Diastolic: none  Extremity: 2+ edema  Pulses: Right radial artery has 2+ (normal) pulse and Left radial artery has 2+ (normal) pulse         DATA REVIEWED:    Right heart pressures:  RA: 15 mmHg  RV: 43/9 with a mean right ventricular pressure of 16    PA: 47/21 with a mean PA pressure 32  PCWP:  21 mmHg   DP     Resistance:  SVR: 818 dynes · sec/cm5 PVR: 160 dynes · sec/cm5     Saturations:  PA: 65.3%         Lab Results (last 24 hours)     Procedure Component Value Units Date/Time    CBC & Differential [[id number]] Collected:  17    Specimen:  Blood Updated:  17    Narrative:       The following orders were created for panel order CBC & Differential.  Procedure                               Abnormality         Status                     ---------                               -----------         ------                     CBC Auto Differential[[id number]]         Abnormal            Final result                 Please view results for these tests on the individual orders.    CBC Auto Differential [[id number]]  (Abnormal) Collected:  17    Specimen:  Blood Updated:  17     WBC 14.99 (H) 10*3/mm3      RBC 4.13 (L) 10*6/mm3      Hemoglobin 10.2 (L) g/dL      Hematocrit 31.8 (L) %      MCV 77.0 (L) fL      MCH 24.7 (L) pg      MCHC 32.1 g/dL      RDW 15.6 (H) %      RDW-SD 43.9 fl      MPV 8.4 fL      Platelets 252 10*3/mm3      Neutrophil % 83.3 (H) %      Lymphocyte % 7.7 (L) %      Monocyte % 5.3 %      Eosinophil % 2.7 %      Basophil % 0.3 %      Immature Grans % 0.7 (H) %      Neutrophils, Absolute 12.50 (H) 10*3/mm3      Lymphocytes, Absolute 1.16 10*3/mm3      Monocytes, Absolute 0.79 10*3/mm3      Eosinophils, Absolute 0.40 10*3/mm3      Basophils, Absolute 0.04 10*3/mm3      Immature Grans, Absolute 0.10 (H) 10*3/mm3     Comprehensive Metabolic Panel [[id number]]  (Abnormal) Collected:  17    Specimen:  Blood Updated:  17     Glucose 153 (H)  mg/dL      BUN 53 (H) mg/dL      Creatinine 5.09 (H) mg/dL      Sodium 137 mmol/L      Potassium 4.1 mmol/L      Chloride 98 mmol/L      CO2 24.0 mmol/L      Calcium 8.8 mg/dL      Total Protein 6.6 g/dL      Albumin 3.40 g/dL      ALT (SGPT) 21 U/L      AST (SGOT) 40 U/L      Alkaline Phosphatase 97 U/L      Total Bilirubin 0.4 mg/dL      eGFR Non African Amer 12 (L) mL/min/1.73      Globulin 3.2 gm/dL      A/G Ratio 1.1 g/dL      BUN/Creatinine Ratio 10.4     Anion Gap 15.0 mmol/L     Ferritin [[id number]]  (Normal) Collected:  04/11/17 1814    Specimen:  Blood Updated:  04/13/17 1037     Ferritin 121.00 ng/mL     Iron Profile [[id number]]  (Abnormal) Collected:  04/11/17 1814    Specimen:  Blood Updated:  04/13/17 1053     Iron <10 (L) mcg/dL      TIBC 188 (L) mcg/dL      Iron Saturation -- %       Unable to calculate.       POC Glucose Fingerstick [[id number]]  (Abnormal) Collected:  04/13/17 1149    Specimen:  Blood Updated:  04/13/17 1201     Glucose 169 (H) mg/dL       Sliding Scale AdminMeter: [id number]: [id number] ALMA RENNY       POC Glucose Fingerstick [[id number]]  (Abnormal) Collected:  04/13/17 1642    Specimen:  Blood Updated:  04/13/17 1703     Glucose 162 (H) mg/dL       Sliding Scale AdminMeter: [id number]: [id number] MANFRED LEIDA       POC Glucose Fingerstick [[id number]]  (Abnormal) Collected:  04/13/17 2107    Specimen:  Blood Updated:  04/13/17 2120     Glucose 173 (H) mg/dL       Sliding Scale AdminMeter: [id number]: [id number] EDER HORAN       CBC & Differential [[id number]] Collected:  04/14/17 0515    Specimen:  Blood Updated:  04/14/17 0634    Narrative:       The following orders were created for panel order CBC & Differential.  Procedure                               Abnormality         Status                     ---------                               -----------         ------                     CBC Auto Differential[[id number]]         Abnormal             Final result                 Please view results for these tests on the individual orders.    CBC Auto Differential [[id number]]  (Abnormal) Collected:  04/14/17 0515    Specimen:  Blood Updated:  04/14/17 0634     WBC 13.13 (H) 10*3/mm3      RBC 4.01 (L) 10*6/mm3      Hemoglobin 9.8 (L) g/dL      Hematocrit 30.8 (L) %      MCV 76.8 (L) fL      MCH 24.4 (L) pg      MCHC 31.8 g/dL      RDW 15.8 (H) %      RDW-SD 44.2 (H) fl      MPV 8.2 fL      Platelets 261 10*3/mm3      Neutrophil % 77.9 %      Lymphocyte % 5.6 (L) %      Monocyte % 12.4 (H) %      Eosinophil % 3.0 %      Basophil % 0.4 %      Immature Grans % 0.7 (H) %      Neutrophils, Absolute 10.23 (H) 10*3/mm3      Lymphocytes, Absolute 0.74 10*3/mm3      Monocytes, Absolute 1.63 (H) 10*3/mm3      Eosinophils, Absolute 0.39 10*3/mm3      Basophils, Absolute 0.05 10*3/mm3      Immature Grans, Absolute 0.09 (H) 10*3/mm3     Comprehensive Metabolic Panel [[id number]]  (Abnormal) Collected:  04/14/17 0515    Specimen:  Blood Updated:  04/14/17 0647     Glucose 127 (H) mg/dL      BUN 61 (H) mg/dL      Creatinine 5.09 (H) mg/dL      Sodium 138 mmol/L      Potassium 4.0 mmol/L      Chloride 101 mmol/L      CO2 23.0 mmol/L      Calcium 8.4 mg/dL      Total Protein 5.7 (L) g/dL      Albumin 2.90 (L) g/dL      ALT (SGPT) 19 (L) U/L      AST (SGOT) 27 U/L      Alkaline Phosphatase 91 U/L      Total Bilirubin 0.2 mg/dL      eGFR Non African Amer 12 (L) mL/min/1.73      Globulin 2.8 gm/dL      A/G Ratio 1.0 (L) g/dL      BUN/Creatinine Ratio 12.0     Anion Gap 14.0 mmol/L         Imaging Results (last 24 hours)     Procedure Component Value Units Date/Time    US Venous Doppler Lower Extremity Bilateral (duplex) [[id number]] Collected:  04/06/17 1007     Updated:  04/06/17 1010    Narrative:       Patient Name:  INGRID HARRIS  Patient ID:  [id number]   Ordering:  SHERON KELLER  Attending:  RIC GUILLORY  Referring:  SHERON HO  KELLER  ------------------------------------------------  Doppler duplex venous examination bilateral lower extremities.  CLINICAL INDICATION: Leg swelling.      COMPARISON: None    FINDINGS:    The common femoral,  femoral, and popliteal veins of the  bilateral     lower extremity are well identified and compress  normally.  Doppler signals are heard either spontaneously or on  distal compression.  No evidence of intraluminal thrombus was  noted.     The visualized posterior calf veins are unremarkable.      Impression:       CONCLUSION:  No evidence of deep venous thrombosis in the common  femoral, superficial femoral veins, or popliteal veins of the  bilateral lower extremities.         Electronically signed by:  Leo Kelly MD  4/6/2017 10:09 AM CDT  Workstation: [id number]    XR Chest 1 View [[id number]] Collected:  04/06/17 2132     Updated:  04/06/17 2137    Narrative:       Exam: AP portable chest    INDICATION: Dyspnea    COMPARISON: 4/5/2017    FINDINGS: AP portable chest. Heart size upper limits of normal.  Interval increase in the bilateral airspace opacity and  infiltrates. Pulmonary vascularity is mildly prominent. Cannot  exclude a small left pleural effusion.      Impression:       Interval increase in the bilateral airspace  opacity/infiltrates    Electronically signed by:  Vicente Nascimento MD  4/6/2017 9:36 PM CDT  Workstation: [id number]        · Left Ventricle: Estimated EF appears to be in the range of 51 - 55%  · Global left ventricular wall motion appears abnormal. The left ventricular cavity is moderate-to-severely dilated  · Left ventricular diastolic dysfunction is noted (grade II w/high LAP) consistent with pseudonormalization. Elevated left atrial pressure  · Right Ventricle: Normal wall thickness, systolic function and septal motion noted. Right ventricular cavity is mildly dilated.  · The inferior vena cava is dilated. Normal IVC inspiratory collapse of greater than 50% noted.  · Mild  mitral valve regurgitation is present.  · Trace to mild tricuspid valve regurgitation is present.  · Calculated right ventricular systolic pressure from tricuspid regurgitation is 66 mmHg  · Severe pulmonary hypertension is present.  · There is no evidence of pericardial effusion.    LE Duplex  FINDINGS:     The common femoral, femoral, and popliteal veins of the  bilateral lower extremity are well identified and compress  normally. Doppler signals are heard either spontaneously or on  distal compression. No evidence of intraluminal thrombus was  noted.      The visualized posterior calf veins are unremarkable.    Assessment/Plan      1. HFpEF. NYHA stage C; FC-III. Today, he remains hypervolemic, and perfused. MAP has been lower with HD.  Volume status with interval improvement since HD.     -RRT per nephrology  -Continue Lopressor and D/C Hydralazine  -Continue 2 L fluid restriction and low sodium diet  -IV Bumex    2. PVH with in-proportion DPG. He is WHO-Class-II.   -As above  -Will need outpatient PFTs and sleep evaluation for GRAHAM    3. NSTEMI.  The patient is chest pain-free and hemodynamically stable.  He is currently tolerating OMT.    -ASA 81mg; Plavix 75mg PO daily; Atorvastatin 80mg  -Lopressor  -Deferred consideration for LHC pending improvement in renal function    Thank you for allowing me to participate in the care of your patient.  Will continue to follow.          This document has been electronically signed by Bharath Dobbins MD PhD on April 14, 2017 7:37 AM

## 2017-04-14 NOTE — PROGRESS NOTES
CRITICAL CARE PROGRESS NOTE  Abby Fu MD    [address]  4/14/2017        Blake Chavez  [id number]  1955  61 y.o. male            LOS: 9 days   Himanshu Tovar MD    Chief Complaint/Reason for visit: F/u acute hypoxic respiratory failure, HAP    Subjective     Interval History:   History taken from: patient/ chart    Transferred to floor and O2 weaned to 2lpm. Worked with PT and states it went well. Overall doing better. Some anxiety about O2 sats at night, but no distress. Not using CPAP as mask is uncomfortable. Bumex tried yesterday with diuresis but Cr remains elevated.    Review of Systems:   Review of Systems   Constitutional: Negative for fever.   Respiratory: Positive for cough and shortness of breath. Negative for wheezing.    Cardiovascular: Positive for leg swelling. Negative for chest pain and palpitations.   Gastrointestinal: Negative for abdominal pain.     All systems were reviewed and negative except as noted above in the HPI.    Medical history, surgical history, social history, family history reviewed    Objective     Intake/Output:    Intake/Output Summary (Last 24 hours) at 04/14/17 0741  Last data filed at 04/14/17 0500   Gross per 24 hour   Intake              300 ml   Output             1150 ml   Net             -850 ml       Nutrition: PO    Infusions:    dextrose 5 % and sodium chloride 0.45 % 30 mL/hr       Respiratory:       Vital Sign Min/Max for last 24 hours:  Temp  Min: 96.8 °F (36 °C)  Max: 98.5 °F (36.9 °C)   BP  Min: 103/57  Max: 139/80   Pulse  Min: 71  Max: 93   Resp  Min: 18  Max: 18   SpO2  Min: 91 %  Max: 99 %   Flow (L/min)  Min: 2  Max: 3   No Data Recorded     Physical Exam:  98.1 °F (36.7 °C) (Oral) 72 129/64 18 91% (!) 394 lb 10 oz (179 kg) Body mass index is 52.58 kg/(m^2).  Physical Exam   Constitutional: He is oriented to person, place, and time. Vital signs are normal. He appears well-developed and well-nourished.    morbidly obese   HENT:   Head: Normocephalic and atraumatic.   Nose: Nose normal.   Mouth/Throat: Mucous membranes are normal.   Eyes: EOM are normal.   Neck:   Large neck circumference   Cardiovascular: Normal rate, regular rhythm and normal heart sounds.  Exam reveals no gallop.    No murmur heard.  Pulmonary/Chest: Effort normal. No accessory muscle usage. No respiratory distress. He has no wheezes. He has no rhonchi.   Abdominal: Soft. Normal appearance and bowel sounds are normal. There is no tenderness.   Morbidly obese   Neurological: He is alert and oriented to person, place, and time.   Skin: Skin is warm and dry. No cyanosis. Nails show no clubbing.   Psychiatric: He has a normal mood and affect. His behavior is normal. Judgment normal. Cognition and memory are normal.       Central Lines/PICC: Present     Results Review:  I personally reviewed the patient's new clinical results.   Lab Results (last 24 hours)     Procedure Component Value Units Date/Time    CBC & Differential [[id number]] Collected:  04/13/17 0823    Specimen:  Blood Updated:  04/13/17 0834    Narrative:       The following orders were created for panel order CBC & Differential.  Procedure                               Abnormality         Status                     ---------                               -----------         ------                     CBC Auto Differential[[id number]]         Abnormal            Final result                 Please view results for these tests on the individual orders.    CBC Auto Differential [[id number]]  (Abnormal) Collected:  04/13/17 0823    Specimen:  Blood Updated:  04/13/17 0834     WBC 14.99 (H) 10*3/mm3      RBC 4.13 (L) 10*6/mm3      Hemoglobin 10.2 (L) g/dL      Hematocrit 31.8 (L) %      MCV 77.0 (L) fL      MCH 24.7 (L) pg      MCHC 32.1 g/dL      RDW 15.6 (H) %      RDW-SD 43.9 fl      MPV 8.4 fL      Platelets 252 10*3/mm3      Neutrophil % 83.3 (H) %      Lymphocyte % 7.7 (L) %      Monocyte %  5.3 %      Eosinophil % 2.7 %      Basophil % 0.3 %      Immature Grans % 0.7 (H) %      Neutrophils, Absolute 12.50 (H) 10*3/mm3      Lymphocytes, Absolute 1.16 10*3/mm3      Monocytes, Absolute 0.79 10*3/mm3      Eosinophils, Absolute 0.40 10*3/mm3      Basophils, Absolute 0.04 10*3/mm3      Immature Grans, Absolute 0.10 (H) 10*3/mm3     Comprehensive Metabolic Panel [[id number]]  (Abnormal) Collected:  04/13/17 0823    Specimen:  Blood Updated:  04/13/17 0907     Glucose 153 (H) mg/dL      BUN 53 (H) mg/dL      Creatinine 5.09 (H) mg/dL      Sodium 137 mmol/L      Potassium 4.1 mmol/L      Chloride 98 mmol/L      CO2 24.0 mmol/L      Calcium 8.8 mg/dL      Total Protein 6.6 g/dL      Albumin 3.40 g/dL      ALT (SGPT) 21 U/L      AST (SGOT) 40 U/L      Alkaline Phosphatase 97 U/L      Total Bilirubin 0.4 mg/dL      eGFR Non African Amer 12 (L) mL/min/1.73      Globulin 3.2 gm/dL      A/G Ratio 1.1 g/dL      BUN/Creatinine Ratio 10.4     Anion Gap 15.0 mmol/L     Ferritin [[id number]]  (Normal) Collected:  04/11/17 1814    Specimen:  Blood Updated:  04/13/17 1037     Ferritin 121.00 ng/mL     Iron Profile [[id number]]  (Abnormal) Collected:  04/11/17 1814    Specimen:  Blood Updated:  04/13/17 1053     Iron <10 (L) mcg/dL      TIBC 188 (L) mcg/dL      Iron Saturation -- %       Unable to calculate.       POC Glucose Fingerstick [[id number]]  (Abnormal) Collected:  04/13/17 1149    Specimen:  Blood Updated:  04/13/17 1201     Glucose 169 (H) mg/dL       Sliding Scale AdminMeter: [id number]: [id number] ALMA RENNY       POC Glucose Fingerstick [[id number]]  (Abnormal) Collected:  04/13/17 1642    Specimen:  Blood Updated:  04/13/17 1703     Glucose 162 (H) mg/dL       Sliding Scale AdminMeter: [id number]: [id number] MANFRED CASAREZ       POC Glucose Fingerstick [[id number]]  (Abnormal) Collected:  04/13/17 2107    Specimen:  Blood Updated:  04/13/17 2120     Glucose 173 (H) mg/dL       Sliding Scale  AdminMeter: [id number]: [id number] EDER HORAN       CBC & Differential [[id number]] Collected:  04/14/17 0515    Specimen:  Blood Updated:  04/14/17 0634    Narrative:       The following orders were created for panel order CBC & Differential.  Procedure                               Abnormality         Status                     ---------                               -----------         ------                     CBC Auto Differential[[id number]]         Abnormal            Final result                 Please view results for these tests on the individual orders.    CBC Auto Differential [[id number]]  (Abnormal) Collected:  04/14/17 0515    Specimen:  Blood Updated:  04/14/17 0634     WBC 13.13 (H) 10*3/mm3      RBC 4.01 (L) 10*6/mm3      Hemoglobin 9.8 (L) g/dL      Hematocrit 30.8 (L) %      MCV 76.8 (L) fL      MCH 24.4 (L) pg      MCHC 31.8 g/dL      RDW 15.8 (H) %      RDW-SD 44.2 (H) fl      MPV 8.2 fL      Platelets 261 10*3/mm3      Neutrophil % 77.9 %      Lymphocyte % 5.6 (L) %      Monocyte % 12.4 (H) %      Eosinophil % 3.0 %      Basophil % 0.4 %      Immature Grans % 0.7 (H) %      Neutrophils, Absolute 10.23 (H) 10*3/mm3      Lymphocytes, Absolute 0.74 10*3/mm3      Monocytes, Absolute 1.63 (H) 10*3/mm3      Eosinophils, Absolute 0.39 10*3/mm3      Basophils, Absolute 0.05 10*3/mm3      Immature Grans, Absolute 0.09 (H) 10*3/mm3     Comprehensive Metabolic Panel [[id number]]  (Abnormal) Collected:  04/14/17 0515    Specimen:  Blood Updated:  04/14/17 0647     Glucose 127 (H) mg/dL      BUN 61 (H) mg/dL      Creatinine 5.09 (H) mg/dL      Sodium 138 mmol/L      Potassium 4.0 mmol/L      Chloride 101 mmol/L      CO2 23.0 mmol/L      Calcium 8.4 mg/dL      Total Protein 5.7 (L) g/dL      Albumin 2.90 (L) g/dL      ALT (SGPT) 19 (L) U/L      AST (SGOT) 27 U/L      Alkaline Phosphatase 91 U/L      Total Bilirubin 0.2 mg/dL      eGFR Non African Amer 12 (L) mL/min/1.73      Globulin 2.8 gm/dL       A/G Ratio 1.0 (L) g/dL      BUN/Creatinine Ratio 12.0     Anion Gap 14.0 mmol/L           Results from last 7 days  Lab Units 04/09/17  0435   PH, ARTERIAL pH units 7.341*   PO2 ART mm Hg 95.5   PCO2, ARTERIAL mm Hg 37.1   HCO3 ART mmol/L 19.6*     Lab Results   Component Value Date    BLOODCX No growth at 5 days 04/05/2017     Lab Results   Component Value Date    URINECX >100,000 CFU/mL Enterococcus faecalis (A) 04/07/2017       I independently reviewed the patient's new imaging, including images and reports.  Imaging Results (last 24 hours)     Procedure Component Value Units Date/Time    IR insert non-tunneled CVC 5+ [[id number]] Resulted:  04/13/17 1545     Updated:  04/13/17 1547    Narrative:       OPERATIVE NOTE  Blake Chavez  1955  * No dates entered *    PREOP DIAGNOSES:  Acute kidney injury requiring short term hemodialysis.    POSTOP DIAGNOSES:   Acute kidney injury requiring short term hemodialysis.    PROCEDURE:   Placement non-tunnelled central venous catheter (13Fr trialysis)  Vascular ultrasound guidance    SURGEON: BRENDA Chapman MD FACS RPVI    ASSISTANT: Stephenie Muñoz RN     ANESTHESIA: local 1% lidocaine    COMPLICATIONS: none    DESCRIPTION OF OPERATION: In CCU, patient placed in supine position,   prepped and draped in sterile fashion.  Vascular ultrasound was used to   identify the right common femoral vein which was 10mm in diameter and   patent.  Cannulated via modified Seldinger technique with mini stick under   ultrasound guidance.  Exchanged for a 0.035 guidewire and serial dilators.    A 13Fr Trialysis catheter was placed, aspirated and flushed without   difficulty with Hep-Lock solution. catheter secured to the skin with 2-0   nylon suture.  Sterile dressing applied. patient tolerated procedure well.   dialysis aware and present to begin treatment.             This document has been electronically signed by Catarino Chapman MD   on April 13, 2017 3:46 PM                    All medications reviewed.     atorvastatin 80 mg Oral Nightly   bumetanide 2 mg Intravenous BID   clopidogrel 75 mg Oral Daily   ferrous sulfate 324 mg Oral BID With Meals   gabapentin 100 mg Oral Nightly   heparin (porcine) 5,000 Units Subcutaneous Q8H   insulin aspart 0-7 Units Subcutaneous 4x Daily AC & at Bedtime   magic butt ointment  Topical BID   metoprolol tartrate 25 mg Oral Q12H   pantoprazole 40 mg Oral Daily         Assessment/Plan     ASSESSMENT:  # Acute hypoxemic respiratory failure- likely multifactorial from HAP and volume overload  # Probable HAP  # Pulmonary edema/ volume overload- improving with HD  # PVH  # NSTEMI- troponin trending down  # Morbid obesity  # Probable GRAHAM/ OHS  # HFpEF  # BRANDO- now on HD  # DM  # Anemia- likely due to blood draws in setting of kidney dz  # UTI      PLAN:  -Wean O2 to keep sats >90%. Goal of off.  -CPAP QHS as tolerated. Will need sleep study as outpt. No sleep MD here until this Summer; can be referred to Dr. Mojica in [place] or sleep physicians in [place] or [place].  -Completed adequate empiric coverage for HAP and UTI.  -Duonebs prn  -OOB to chair to optimize respiratory mechanics  -Pulmonary toilet  -PT following  -Cardiac management per Dr. Dobbins  -HD and diuresis per Dr. Brand    VTE Prophylaxis: Heparin TID  Stress Ulcer Prophylaxis: Protonix    Recommend repeat CXR prior to discharge and again in 4wks to ensure clearance of infiltrates. He does not appear to have underlying lung disease so I anticipate recovery, though he will still be impacted by other co-morbidities.    I will sign off. Please call with questions or if I can be of further assistance.       Critical Care Time Spent: 18 minutes  I personally provided care to this critically ill patient as documented above.  Critical care time does not include time spent on separately billed procedures.  None of my critical care time was concurrent with other critical care  providers.         This document has been electronically signed by Abby Fu MD on April 14, 2017 7:41 AM      [phone]    EMR Dragon/Transcription disclaimer:     Much of this encounter note is an electronic transcription/translation of spoken language to printed text. The electronic translation of spoken language may permit erroneous, or at times, nonsensical words or phrases to be inadvertently transcribed; Although I have reviewed the note for such errors, some may still exist.

## 2017-04-14 NOTE — PROGRESS NOTES
"[facility]  [address] [place] [address]. [address]  T - [phone]  F - [phone]     Progress Note          PATIENT  DEMOGRAPHICS   PATIENT NAME: Blake Chavez                      PHYSICIAN: Alanna Brand MD  : 1955  MRN: [id number]   LOS: 9 days    Patient Care Team:  Himanshu Tovar MD as PCP - General (Family Medicine)  Subjective   SUBJECTIVE   Soa improving s/p Bumex.  Slept reasonably overnight, and ambulated 5 feet with Physical Therapy yesterday.       Objective   OBJECTIVE   Vital Signs  Temp:  [96.8 °F (36 °C)-98.5 °F (36.9 °C)] 98.1 °F (36.7 °C)  Heart Rate:  [71-93] 72  Resp:  [18] 18  BP: (112-139)/(59-80) 129/64    Flowsheet Rows         First Filed Value    Admission Height  72.99\" (185.4 cm) Documented at 2017 1542    Admission Weight  (!)  386 lb 6.4 oz (175 kg) Documented at 2017 1659           I/O last 3 completed shifts:  In: 300 [P.O.:300]  Out: 1250 [Urine:1250]  (0.3 mL/cc/hour over last 24 hours)    PHYSICAL EXAM    Physical Exam   Extremities entry bilaterally decrease breath sounds at bases, bibasal crackles  Heart regular rate and rhythm no gallop.  Abdomen obese soft nontender distended bowel sounds are present.  Extremities trace edema in both ankles    RESULTS   Results Review:      Results from last 7 days  Lab Units 17  0515 17  0823 17  0246  04/10/17  0155   SODIUM mmol/L 138 137 140  < > 143   POTASSIUM mmol/L 4.0 4.1 3.6  < > 4.0   CHLORIDE mmol/L 101 98 102  < > 109   TOTAL CO2 mmol/L 23.0 24.0 22.0  < > 19.0*   BUN mg/dL 61* 53* 50*  < > 62*   CREATININE mg/dL 5.09* 5.09* 4.25*  < > 3.89*   CALCIUM mg/dL 8.4 8.8 8.0*  < > 8.0*   BILIRUBIN mg/dL 0.2 0.4  --   --  0.2   ALK PHOS U/L 91 97  --   --  77   ALT (SGPT) U/L 19* 21  --   --  17*   AST (SGOT) U/L 27 40  --   --  36   GLUCOSE mg/dL 127* 153* 178*  < > 209*   < > = values in this interval not displayed.    Estimated Creatinine Clearance: 25 " mL/min (by C-G formula based on Cr of 5.09).      Results from last 7 days  Lab Units 04/12/17  0246 04/11/17  0226 04/10/17  0155   PHOSPHORUS mg/dL 5.9* 6.8* 5.6*               Results from last 7 days  Lab Units 04/14/17  0515 04/13/17  0823 04/12/17  0246 04/11/17  0226 04/10/17  0155   WBC 10*3/mm3 13.13* 14.99* 13.93* 14.36* 14.76*   HEMOGLOBIN g/dL 9.8* 10.2* 8.9* 7.8* 7.2*   PLATELETS 10*3/mm3 261 252 215 222 199         Results from last 7 days  Lab Units 04/12/17  0246   INR  1.37*         Imaging Results (last 24 hours)     Imaging Results (last 24 hours)     Procedure Component Value Units Date/Time    CT Abdomen Pelvis Without Contrast [[id number]] Collected:  04/10/17 2120     Updated:  04/10/17 2133    Narrative:         [facility] [place]    Patient Name: INGRID HARRIS    ORDERING: ROSA MARIA HOLT    ATTENDING: RIC GUILLORY     REFERRING: ROSA MARIA HOLT  -----------------------    PROCEDURE: CT abdomen and pelvis without contrast on 4/10/2017    CLINICAL INDICATION:  Bilateral ureteral stones, acute renal  insufficiency, right hydronephrosis    TECHNIQUE: Multiple axial images are obtained throughout the  abdomen and pelvis without the administration of contrast. This  study was performed with techniques to keep radiation doses as  low as reasonably achievable, (ALARA).   Total DLP is 5847.1 mGy*cm.    COMPARISON: 2/10/2017     FINDINGS:     ABDOMEN: There has been development of moderate size bilateral  pleural effusions with adjacent bibasilar atelectasis and likely  component of pneumonia in the right lower lobe. Right ureteral  stent is noted extending from the upper pole collecting system of  the right kidney into the right aspect of the bladder and appears  in good position. There remains a large right renal pelvis stone  measuring up to 2.4 cm in greatest diameter with other smaller  nonobstructing right renal stones. There are no ureteral stones  and no hydronephrosis. No  left-sided renal or ureteral stone is  now noted. Stable left adrenal nodule has Hounsfield unit  measurements consistent with an adenoma. The unenhanced solid  abdominal organs are otherwise unremarkable. There is no  abdominal adenopathy. There is no free fluid or free air within  the abdomen. The abdominal portion of the GI tract is  unremarkable.    Pelvis: There is a moderate-sized umbilical hernia containing  only fat. Meier catheter extends into the decompressed bladder.  There is no free fluid in the pelvis. There is no pelvic  adenopathy. There is mild diverticulosis. The pelvic portion of  the GI tract is otherwise unremarkable. Degenerative changes are  noted in the spine and hips.      Impression:       CONCLUSION:   1. Bilateral pleural effusions with bibasilar atelectasis and  likely component of pneumonia at least in the right lower lobe.  2. Right ureteral stent in good position with continued right  nephrolithiasis without hydronephrosis or ureteral stone.  3. Moderate-sized umbilical hernia containing fat.  4. Mild diverticulosis.    Electronically signed by:  Benjamín Roy  4/10/2017 9:32 PM  CDT Workstation: [id number]                 MEDICATIONS      atorvastatin 80 mg Oral Nightly   bumetanide 2 mg Intravenous BID   clopidogrel 75 mg Oral Daily   ferrous sulfate 324 mg Oral BID With Meals   gabapentin 100 mg Oral Nightly   heparin (porcine) 5,000 Units Subcutaneous Q8H   insulin aspart 0-7 Units Subcutaneous 4x Daily AC & at Bedtime   magic butt ointment  Topical BID   metoprolol tartrate 25 mg Oral Q12H   pantoprazole 40 mg Oral Daily       dextrose 5 % and sodium chloride 0.45 % 30 mL/hr       Assessment/Plan   ASSESSMENT / PLAN    Principal Problem:    NSTEMI (non-ST elevated myocardial infarction)  Active Problems:    Type 2 diabetes mellitus    Osteoarthritis of multiple joints    Essential hypertension    Bacterial pneumonia    Morbid obesity with BMI of 50.0-59.9, adult    Acute  cystitis    Heart failure with preserved ejection fraction, borderline, class III    Pulmonary hypertension    Anemia    Assessment  1.  Acute kidney injury/ATN : non oliguric.  No dialysis yesterday, BUN slightly higher, 51 from 53 yesterday. on ckd 3/4 prior cr came down to 2.6.  2.  Non-ST elevation myocardial infarction.  3.  Microcytic anemia, iron studies consistent with anemia of chronic disease. On ferrous sulfate. S/p 2 Units 4/11/17, Hb 9.8 from 10.2 yesterday.  Fecal occult positive.  Patient had colonoscopy done by Dr Green (Nov 2016) which showed multiple polyps, including 15 mm semi-penduculated polyp 100 cm proximal to anus.    4.  HFpEF, FC-III, Stage C.  5.  Nephrolithiasis s/p cysto, bilateral retrograde, right ureteroscopy, lithotripsy with stent placement, bladder stone lithotripsy 3/29/17. Repeat CT scan showed large right renal pelvis stone, smaller stones but no hydro. Urology says no urological intervention at this time, until medically stable.  6. E.fecalis uti, completed the course of Zyvox  Plan  1.  Appears hypervolemic on exam and cxr. RHC showed elevated PCWP (21 mm Hg). Clinically improving with HD.  HD again today, will add SADIE since ferritin is >100. Hold bumex but restart from tomorrow. Keep hu for now           This document has been electronically signed by Gentry Subramanian MD on April 14, 2017 8:32 AM      I discussed the patients findings and my recommendations with patient and family   I have reviewed the case with the resident. I have also examined and seen the patient. I agree with the assessment and plan as documented in the resident's note.    Alanna Brand MD  04/14/17  8:32 AM

## 2017-04-14 NOTE — DISCHARGE SUMMARY
[address] [address]  T - [phone]     DISCHARGE SUMMARY         PATIENT  DEMOGRAPHICS   PATIENT NAME: Blake Chavez                      PHYSICIAN: Himanshu Tovar MD  : 1955  MRN: [id number]    ADMISSION/DISCHARGE INFO   ADMISSION DATE: 2017   DISCHARGE DATE:     ADMISSION DIAGNOSES: Bacterial pneumonia [J15.9]  Respiratory failure [J96.90]  Pulmonary hypertension [I27.2]  DISCHARGE DIAGNOSES: NSTEMI                                                 Type II Diabetes Mellitus                                                 Pulmonary Hypertension                                                 Heart Failure                                                 Renal Failure    Principal Problem:    NSTEMI (non-ST elevated myocardial infarction)  Active Problems:    Type 2 diabetes mellitus    Osteoarthritis of multiple joints    Essential hypertension    Bacterial pneumonia    Morbid obesity with BMI of 50.0-59.9, adult    Acute cystitis    Heart failure with preserved ejection fraction, borderline, class III    Pulmonary hypertension    Anemia      SERVICE: Family Medicine   CONSULTS   Consult Orders (all)     Start     Ordered    17 0909  Inpatient Consult to Case Management   Once     Provider:  (Not yet assigned)    17 0909    17 0907  Inpatient Consult to Case Management   Once     Provider:  (Not yet assigned)    17 0906    04/10/17 0800  Inpatient Consult to Heart Failure Navigator  Once     Provider:  (Not yet assigned)    04/10/17 0759    17 1538  Inpatient Consult to Urology  Once     Specialty:  Urology  Provider:  Blake Lopez MD    17 1538    17 204  Inpatient Consult to Nutrition  Once     Provider:  (Not yet assigned)    17 2048    17 204  Inpatient Consult to Nephrology  Once     Specialty:  Nephrology  Provider:  Alanna Brand MD     04/05/17 2046 04/05/17 2018  Inpatient Consult to Cardiology  Once     Specialty:  Cardiology  Provider:  Bharath Dobbins MD PhD    04/05/17 2017 04/05/17 1857  Inpatient Consult to Cardiology  Once,   Status:  Canceled     Specialty:  Cardiology  Provider:  Bharath Dobbins MD PhD    04/05/17 1857          PROCEDURES     Imaging Results (last 24 hours)     Procedure Component Value Units Date/Time    IR insert non-tunneled CVC 5+ [[id number]] Resulted:  04/13/17 1545     Updated:  04/13/17 1547    Narrative:       OPERATIVE NOTE  Ingrid Harris  1955  * No dates entered *    PREOP DIAGNOSES:  Acute kidney injury requiring short term hemodialysis.    POSTOP DIAGNOSES:   Acute kidney injury requiring short term hemodialysis.    PROCEDURE:   Placement non-tunnelled central venous catheter (13Fr trialysis)  Vascular ultrasound guidance    SURGEON: BRENDA Chapman MD FACS RPVI    ASSISTANT: Stephenie Muñoz RN     ANESTHESIA: local 1% lidocaine    COMPLICATIONS: none    DESCRIPTION OF OPERATION: In CCU, patient placed in supine position,   prepped and draped in sterile fashion.  Vascular ultrasound was used to   identify the right common femoral vein which was 10mm in diameter and   patent.  Cannulated via modified Seldinger technique with mini stick under   ultrasound guidance.  Exchanged for a 0.035 guidewire and serial dilators.    A 13Fr Trialysis catheter was placed, aspirated and flushed without   difficulty with Hep-Lock solution. catheter secured to the skin with 2-0   nylon suture.  Sterile dressing applied. patient tolerated procedure well.   dialysis aware and present to begin treatment.             This document has been electronically signed by Catarino Chapman MD   on April 13, 2017 3:46 PM               Ct Abdomen Pelvis Without Contrast    Result Date: 4/10/2017  Narrative: [facility] Patient Name: INGRID AHRRIS ORDERING: ROSA MARIA HOLT ATTENDING:  RIC GUILLORY REFERRING: ROSA MARIA HOLT ----------------------- PROCEDURE: CT abdomen and pelvis without contrast on 4/10/2017 CLINICAL INDICATION:  Bilateral ureteral stones, acute renal insufficiency, right hydronephrosis TECHNIQUE: Multiple axial images are obtained throughout the abdomen and pelvis without the administration of contrast. This study was performed with techniques to keep radiation doses as low as reasonably achievable, (ALARA). Total DLP is 5847.1 mGy*cm. COMPARISON: 2/10/2017 FINDINGS: ABDOMEN: There has been development of moderate size bilateral pleural effusions with adjacent bibasilar atelectasis and likely component of pneumonia in the right lower lobe. Right ureteral stent is noted extending from the upper pole collecting system of the right kidney into the right aspect of the bladder and appears in good position. There remains a large right renal pelvis stone measuring up to 2.4 cm in greatest diameter with other smaller nonobstructing right renal stones. There are no ureteral stones and no hydronephrosis. No left-sided renal or ureteral stone is now noted. Stable left adrenal nodule has Hounsfield unit measurements consistent with an adenoma. The unenhanced solid abdominal organs are otherwise unremarkable. There is no abdominal adenopathy. There is no free fluid or free air within the abdomen. The abdominal portion of the GI tract is unremarkable. Pelvis: There is a moderate-sized umbilical hernia containing only fat. Meier catheter extends into the decompressed bladder. There is no free fluid in the pelvis. There is no pelvic adenopathy. There is mild diverticulosis. The pelvic portion of the GI tract is otherwise unremarkable. Degenerative changes are noted in the spine and hips.     Impression: CONCLUSION: 1. Bilateral pleural effusions with bibasilar atelectasis and likely component of pneumonia at least in the right lower lobe. 2. Right ureteral stent in good position with  continued right nephrolithiasis without hydronephrosis or ureteral stone. 3. Moderate-sized umbilical hernia containing fat. 4. Mild diverticulosis. Electronically signed by:  Benjamín Roy  4/10/2017 9:32 PM CDT Workstation: [id number]    Xr Chest 2 View    Result Date: 4/5/2017  Narrative: Patient Name:  INGRID HARRIS Patient ID:  [id number] Ordering:  NATHALIA JARVIS Attending: Referring:  NATHALIA JARVIS ------------------------------------------------ Chest PA and lateral CLINICAL INDICATION: Shortness of breath COMPARISON: February 4, 2008. FINDINGS: Cardiac silhouette is normal in size. Pulmonary vascularity is unremarkable. Subtle patchy infiltrative changes in both mid and lower lung fields. Above findings suggest pneumonitis. Lungs otherwise clear.     Impression: CONCLUSION: Subtle bilateral patchy infiltrative changes in both mid and lower lung fields. Electronically signed by:  Leo Kelly MD  4/5/2017 10:46 AM CDT Workstation: [id number]    Nm Lung Scan Perfusion Particulate    Result Date: 4/5/2017  Narrative: Patient Name:  INGRID HARRIS Patient ID:  [id number] Ordering:  SHERON KELLER Attending:  SHERON KELLER Referring:  SHERON KELLER ------------------------------------------------ Nuclear medicine perfusion lung scan History: Shortness of breath. Elevated d-dimer. Correlation: Chest radiograph 4/5/2017. Following the intravenous administration of 6.6 millicuries of technetium 99m MAA, multiple perfusion images obtained. Some inhomogeneous distribution of radionuclide. No peripheral wedge-shaped perfusion defect to indicate pulmonary embolism.     Impression: Conclusion: Low probability for pulmonary embolism. [id number] Electronically signed by:  Gene Fu MD  4/5/2017 8:47 PM CDT Workstation: [id number]    Us Guided Vascular Access    Result Date: 4/11/2017  Narrative: This procedure was auto-finalized with no dictation required.    Xr Chest 1 View    Result Date:  4/13/2017  Narrative: [facility] Patient Name: INGRID HARRIS ORDERING: LEIDA PHILLIP ATTENDING: LIZ POLO REFERRING: LEIDA PHILLIP ----------------------- PROCEDURE: Chest single view on 4/13/2017 CLINICAL INDICATION:  Respiratory failure, pulmonary edema COMPARISON: 4/10/2017 FINDINGS: There has been mild improvement in bilateral opacities consistent with mild improving edema or pneumonia. Heart is upper limits normal for size. There is a probable small left pleural effusion.     Impression: CONCLUSION: Mild improvement in bilateral edema or pneumonia. Electronically signed by:  Benjamín Roy  4/13/2017 5:51 AM CDT Workstation: [id number]    Xr Chest 1 View    Result Date: 4/10/2017  Narrative: Exam: AP portable chest INDICATION: Respiratory failure COMPARISON: 4/8/2017 FINDINGS: AP portable chest. Heart is enlarged. The pulmonary vascularity is prominent. No change in bilateral patchy airspace opacity. Cannot exclude small effusions.     Impression: No significant interval change. Electronically signed by:  Vicente Nascimento MD  4/10/2017 6:36 AM CDT Workstation: [id number]    Xr Chest 1 View    Result Date: 4/8/2017  Narrative: Exam: AP portable chest INDICATION: Pneumonia COMPARISON: 4/6/2016 FINDINGS: AP portable chest. Heart size upper limits normal. No change in bilateral airspace opacity. Pulmonary vascularity is mildly prominent. Cannot exclude a small Left pleural effusion.     Impression: No significant interval change in bilateral airspace opacity Electronically signed by:  Vicente Nascimento MD  4/8/2017 5:55 AM CDT Workstation: [id number]    Xr Chest 1 View    Result Date: 4/6/2017  Narrative: Exam: AP portable chest INDICATION: Dyspnea COMPARISON: 4/5/2017 FINDINGS: AP portable chest. Heart size upper limits of normal. Interval increase in the bilateral airspace opacity and infiltrates. Pulmonary vascularity is mildly prominent. Cannot exclude a small left pleural  effusion.     Impression: Interval increase in the bilateral airspace opacity/infiltrates Electronically signed by:  Vicente Nascimento MD  4/6/2017 9:36 PM CDT Workstation: [id number]    Us Venous Doppler Lower Extremity Bilateral (duplex)    Result Date: 4/6/2017  Narrative: Patient Name:  INGRID HARRIS Patient ID:  [id number] Ordering:  SHERON KELLER Attending:  RIC GUILLORY Referring:  SHERON KELLER ------------------------------------------------ Doppler duplex venous examination bilateral lower extremities. CLINICAL INDICATION: Leg swelling. COMPARISON: None FINDINGS: The common femoral,  femoral, and popliteal veins of the bilateral     lower extremity are well identified and compress normally.  Doppler signals are heard either spontaneously or on distal compression.  No evidence of intraluminal thrombus was noted. The visualized posterior calf veins are unremarkable.     Impression: CONCLUSION:  No evidence of deep venous thrombosis in the common femoral, superficial femoral veins, or popliteal veins of the bilateral lower extremities.     Electronically signed by:  eLo Kelly MD  4/6/2017 10:09 AM CDT Workstation: [id number]    Ir Insert Non-tunneled Cvc 5+    Result Date: 4/13/2017  Narrative: OPERATIVE NOTE Ingrid Harris 1955 * No dates entered * PREOP DIAGNOSES: Acute kidney injury requiring short term hemodialysis. POSTOP DIAGNOSES: Acute kidney injury requiring short term hemodialysis. PROCEDURE: Placement non-tunnelled central venous catheter (13Fr trialysis) Vascular ultrasound guidance SURGEON: BRENDA Chapmna MD FACS RPVI ASSISTANT: Stephenie Muñoz RN  ANESTHESIA: local 1% lidocaine COMPLICATIONS: none DESCRIPTION OF OPERATION: In CCU, patient placed in supine position, prepped and draped in sterile fashion.  Vascular ultrasound was used to identify the right common femoral vein which was 10mm in diameter and patent.  Cannulated via modified Seldinger technique with  mini stick under ultrasound guidance.  Exchanged for a 0.035 guidewire and serial dilators.  A 13Fr Trialysis catheter was placed, aspirated and flushed without difficulty with Hep-Lock solution. catheter secured to the skin with 2-0 nylon suture.  Sterile dressing applied. patient tolerated procedure well. dialysis aware and present to begin treatment.    This document has been electronically signed by Catarino Chapman MD on April 13, 2017 3:46 PM           HISTORY OF PRESENT ILLNESS   Blake Chavez is a 61 y.o. male presented in renal failure and dyspnea.         HOSPITAL COURSE   He was admitted and Vianey Kennedy and Cathie saw him. He underwent a right heart Cath. Which suggested Pulmonary Hypertension. His Renal Function declined and his blood sugar were treated with a Sliding Scale Insulin.He received Dialysiis. He will need to go to the [facility] for continued treatment and will be getting a tunnel catheter.     DISCHARGE CONDITION   Stable    DISPOSITION   [facility]     DISCHARGE MEDICATIONS        Your medication list      START taking these medications       Instructions Last Dose Given Next Dose Due    albumin human 25 % 25%        Infuse 50 mL into a venous catheter As Needed (Hypotension During Dialysis) for up to 4 doses.         atorvastatin 80 MG tablet   Commonly known as:  LIPITOR        Take 1 tablet by mouth Every Night.         bumetanide 0.25 MG/ML injection   Commonly known as:  BUMEX   Start taking on:  4/15/2017        Infuse 8 mL into a venous catheter 2 (Two) Times a Day.         clopidogrel 75 MG tablet   Commonly known as:  PLAVIX        Take 1 tablet by mouth Daily.         dextrose 40 % gel   Commonly known as:  GLUTOSE        Take 15 g by mouth Every 15 (Fifteen) Minutes As Needed for Low Blood Sugar (BS is less than 70 ,is not NPO and can swallow).         dextrose 5 % and sodium chloride 0.45 % 5-0.45 % infusion        Infuse 30 mL/hr into a venous catheter Continuous for  30 days.         dextrose 50 % solution   Commonly known as:  D50W        Infuse 50 mL into a venous catheter Every 15 (Fifteen) Minutes As Needed for Low Blood Sugar for up to 30 days.         ferrous sulfate 324 (65 FE) MG tablet delayed-release EC tablet        Take 1 tablet by mouth 2 (Two) Times a Day With Meals.         glucagon (human recombinant) 1 MG injection   Commonly known as:  GLUCAGEN DIAGNOSTIC        Inject 1 mg under the skin Every 15 (Fifteen) Minutes As Needed (BG< 70 - Pt  sans IV Access - Unresponsive, NPO or Unable To Swallow).         heparin (porcine) 5000 UNIT/ML injection        Inject 1 mL under the skin Every 8 (Eight) Hours.         heparin (porcine) 1000 UNIT/ML injection        Infuse 2 mL into a venous catheter As Needed (For Dialysis Catheter Care.).         insulin aspart 100 UNIT/ML injection   Commonly known as:  novoLOG        Inject 0-7 Units under the skin 4 (Four) Times a Day Before Meals & at Bedtime.         ipratropium-albuterol 0.5-2.5 mg/mL nebulizer   Commonly known as:  DUO-NEB        Take 3 mL by nebulization Every 4 (Four) Hours As Needed for Wheezing or Shortness of Air.         magic butt ointment        Apply 15 g topically 2 (Two) Times a Day.         metoprolol tartrate 25 MG tablet   Commonly known as:  LOPRESSOR        Take 1 tablet by mouth Every 12 (Twelve) Hours.         sodium chloride 0.65 % nasal spray   Commonly known as:  OCEAN        1 spray into each nostril As Needed for Congestion.         sodium chloride 0.9 % bolus        Infuse 1,000 mL into a venous catheter As Needed (to maintain SBP > 90mmHG) for up to 1 day.         sodium chloride 0.9 % flush        Infuse 1-10 mL into a venous catheter As Needed for Line Care.         sodium chloride 0.9 % flush        Infuse 1-10 mL into a venous catheter As Needed for Line Care.           CHANGE how you take these medications       Instructions Last Dose Given Next Dose Due    glyBURIDE 5 MG tablet    Commonly known as:  DIAbeta   What changed:  See the new instructions.        TAKE 2 TABLETS TWICE DAILY BEFORE MEALS           CONTINUE taking these medications       Instructions Last Dose Given Next Dose Due    FE BISGLY-FE [id number] PO              fish oil 1000 MG capsule capsule              gabapentin 100 MG capsule   Commonly known as:  NEURONTIN        TAKE 1 CAPSULE AT BEDTIME FOR FOOT PAIN         Glucosamine 500 MG capsule              HYDROcodone-acetaminophen  MG per tablet   Commonly known as:  NORCO              pantoprazole 40 MG EC tablet   Commonly known as:  PROTONIX              POLY-IRON 150 150 MG capsule   Generic drug:  iron polysaccharides        TAKE 1 CAPSULE TWICE DAILY         VITAMIN D (ERGOCALCIFEROL) PO                STOP taking these medications          amoxicillin-clavulanate 875-125 MG per tablet   Commonly known as:  AUGMENTIN           diltiaZEM 360 MG 24 hr capsule   Commonly known as:  TIAZAC           doxycycline 100 MG capsule   Commonly known as:  VIBRAMYCIN           hydrALAZINE 25 MG tablet   Commonly known as:  APRESOLINE           lisinopril 20 MG tablet   Commonly known as:  PRINIVIL,ZESTRIL           nateglinide 120 MG tablet   Commonly known as:  STARLIX           omeprazole 40 MG capsule   Commonly known as:  priLOSEC           ONE TOUCH ULTRA TEST test strip   Generic drug:  glucose blood           oxyCODONE-acetaminophen 7.5-325 MG per tablet   Commonly known as:  PERCOCET           pravastatin 40 MG tablet   Commonly known as:  PRAVACHOL           promethazine 12.5 MG tablet   Commonly known as:  PHENERGAN           SITagliptin 100 MG tablet   Commonly known as:  JANUVIA                Where to Get Your Medications      These medications were sent to [place], [place] - [address] [facility] [phone]  - [phone]   [address] [facility] [place] [address]     Phone:  [phone]    • clopidogrel 75 MG tablet   • ferrous  sulfate 324 (65 FE) MG tablet delayed-release EC tablet   • sodium chloride 0.65 % nasal spray         Information about where to get these medications is not yet available     ! Ask your nurse or doctor about these medications    • albumin human 25 % 25%   • atorvastatin 80 MG tablet   • bumetanide 0.25 MG/ML injection   • dextrose 40 % gel   • dextrose 5 % and sodium chloride 0.45 % 5-0.45 % infusion   • dextrose 50 % solution   • glucagon (human recombinant) 1 MG injection   • heparin (porcine) 1000 UNIT/ML injection   • heparin (porcine) 5000 UNIT/ML injection   • insulin aspart 100 UNIT/ML injection   • ipratropium-albuterol 0.5-2.5 mg/mL nebulizer   • magic butt ointment   • metoprolol tartrate 25 MG tablet   • sodium chloride 0.9 % bolus   • sodium chloride 0.9 % flush   • sodium chloride 0.9 % flush             INSTRUCTIONS   Activity: Up with Assistance    Diet: Renal and ADA    Special Instructions: none    FOLLOW UP   Additional Instructions for the Follow-ups that You Need to Schedule     Discharge Follow-up with Specified Provider    As directed    Follow Up Details:  3 - 5 days with KEITH Lynn or [facility]             Follow-up Information     Follow up with KEITH Weiner .    Specialty:  Cardiology    Why:  3 - 5 days with KEITH Lynn or [facility]    Contact information:    [address] [place] [address] [address]  [facility] [address] [phone]          Follow up with Himanshu Tovar MD .    Specialty:  Family Medicine    Contact information:    [address] [place] [address]   [facility] [address] [phone]          Follow up with [facility] .    Specialty:  [facility]         PENDING TEST RESULTS AT DISCHARGE    Order Current Status    POC Glucose Fingerstick In process                       Himnashu Tovar MD  04/14/17  3:35 PM

## 2017-04-14 NOTE — PLAN OF CARE
Problem: Patient Care Overview (Adult)  Goal: Plan of Care Review  Outcome: Ongoing (interventions implemented as appropriate)  Pt had dialysis today. Pt will keep hu cath through weekend to measure strict output per Dr. Brand.  Goal: Adult Individualization and Mutuality  Outcome: Ongoing (interventions implemented as appropriate)  Goal: Discharge Needs Assessment  Outcome: Ongoing (interventions implemented as appropriate)    Problem: Fall Risk (Adult)  Goal: Absence of Falls  Outcome: Ongoing (interventions implemented as appropriate)    Problem: Pressure Ulcer (Adult)  Goal: Signs and Symptoms of Listed Potential Problems Will be Absent or Manageable (Pressure Ulcer)  Outcome: Ongoing (interventions implemented as appropriate)

## 2017-04-14 NOTE — PLAN OF CARE
Problem: Inpatient Physical Therapy  Goal: Bed Mobility Goal STG- PT  Outcome: Unable to achieve outcome(s) by discharge Date Met:  04/14/17 04/13/17 1014 04/14/17 1440   Bed Mobility PT STG   Bed Mobility PT STG, Date Established 04/13/17 --    Bed Mobility PT STG, Time to Achieve 5 - 7 days --    Bed Mobility PT STG, Activity Type all bed mobility --    Bed Mobility PT STG, [place] Level supervision required --    Bed Mobility PT STG, Additional Goal May elevate HOB --    Bed Mobility PT STG, Date Goal Reviewed --  04/14/17   Bed Mobility PT STG, Outcome --  goal not met   Bed Mobility PT STG, Reason Goal Not Met --  discharged from facility       Goal: Transfer Training Goal 1 STG- PT  Outcome: Unable to achieve outcome(s) by discharge Date Met:  04/14/17 04/13/17 1014 04/14/17 1440   Transfer Training PT STG   Transfer Training PT STG, Date Established 04/13/17 --    Transfer Training PT STG, Time to Achieve 5 - 7 days --    Transfer Training PT STG, Activity Type all transfers --    Transfer Training PT STG, [place] Level supervision required --    Transfer Training PT STG, Assist Device walker, rolling --    Transfer Training PT STG, Date Goal Reviewed --  04/14/17   Transfer Training PT STG, Outcome --  goal not met   Transfer Training PT STG, Reason Goal Not Met --  discharged from facility       Goal: Gait Training Goal LTG- PT  Outcome: Unable to achieve outcome(s) by discharge Date Met:  04/14/17 04/13/17 1014 04/14/17 1440   Gait Training PT LTG   Gait Training Goal PT LTG, Date Established 04/13/17 --    Gait Training Goal PT LTG, Time to Achieve by discharge --    Gait Training Goal PT LTG, [place] Level supervision required --    Gait Training Goal PT LTG, Assist Device walker, rolling --    Gait Training Goal PT LTG, Distance to Achieve 25'x1 --    Gait Training Goal PT LTG, Date Goal Reviewed --  04/14/17   Gait Training Goal PT LTG, Outcome --  goal not met   Gait Training  Goal PT LTG, Reason Goal Not Met --  discharged from facility

## 2017-04-15 LAB
GLUCOSE BLDC GLUCOMTR-MCNC: 138 MG/DL (ref 70–130)
GLUCOSE BLDC GLUCOMTR-MCNC: 189 MG/DL (ref 70–130)
GLUCOSE BLDC GLUCOMTR-MCNC: 215 MG/DL (ref 70–130)

## 2017-04-15 PROCEDURE — 97530 THERAPEUTIC ACTIVITIES: CPT

## 2017-04-15 PROCEDURE — G8979 MOBILITY GOAL STATUS: HCPCS

## 2017-04-15 PROCEDURE — 82962 GLUCOSE BLOOD TEST: CPT

## 2017-04-15 PROCEDURE — 25010000002 HEPARIN (PORCINE) PER 1000 UNITS: Performed by: INTERNAL MEDICINE

## 2017-04-15 PROCEDURE — 97162 PT EVAL MOD COMPLEX 30 MIN: CPT

## 2017-04-15 PROCEDURE — G8978 MOBILITY CURRENT STATUS: HCPCS

## 2017-04-15 RX ORDER — DEXTROSE AND SODIUM CHLORIDE 5; .45 G/100ML; G/100ML
30 INJECTION, SOLUTION INTRAVENOUS CONTINUOUS
Status: DISCONTINUED | OUTPATIENT
Start: 2017-04-15 | End: 2017-04-15 | Stop reason: ALTCHOICE

## 2017-04-16 LAB
ALBUMIN SERPL-MCNC: 3 G/DL (ref 3.4–4.8)
ALBUMIN/GLOB SERPL: 1.1 G/DL (ref 1.1–1.8)
ALP SERPL-CCNC: 89 U/L (ref 38–126)
ALT SERPL W P-5'-P-CCNC: 19 U/L (ref 21–72)
ANION GAP SERPL CALCULATED.3IONS-SCNC: 15 MMOL/L (ref 5–15)
AST SERPL-CCNC: 26 U/L (ref 17–59)
BILIRUB SERPL-MCNC: 0.4 MG/DL (ref 0.2–1.3)
BUN BLD-MCNC: 48 MG/DL (ref 7–21)
BUN/CREAT SERPL: 15.7 (ref 7–25)
CALCIUM SPEC-SCNC: 8.9 MG/DL (ref 8.4–10.2)
CHLORIDE SERPL-SCNC: 101 MMOL/L (ref 95–110)
CO2 SERPL-SCNC: 25 MMOL/L (ref 22–31)
CREAT BLD-MCNC: 3.06 MG/DL (ref 0.7–1.3)
DEPRECATED RDW RBC AUTO: 44.2 FL (ref 35.1–43.9)
ERYTHROCYTE [DISTWIDTH] IN BLOOD BY AUTOMATED COUNT: 15.6 % (ref 11.5–14.5)
GFR SERPL CREATININE-BSD FRML MDRD: 21 ML/MIN/1.73 (ref 49–113)
GLOBULIN UR ELPH-MCNC: 2.8 GM/DL (ref 2.3–3.5)
GLUCOSE BLD-MCNC: 142 MG/DL (ref 60–100)
GLUCOSE BLDC GLUCOMTR-MCNC: 137 MG/DL (ref 70–130)
GLUCOSE BLDC GLUCOMTR-MCNC: 150 MG/DL (ref 70–130)
GLUCOSE BLDC GLUCOMTR-MCNC: 214 MG/DL (ref 70–130)
GLUCOSE BLDC GLUCOMTR-MCNC: 221 MG/DL (ref 70–130)
HCT VFR BLD AUTO: 31.8 % (ref 39–49)
HGB BLD-MCNC: 10 G/DL (ref 13.7–17.3)
MAGNESIUM SERPL-MCNC: 1.8 MG/DL (ref 1.6–2.3)
MCH RBC QN AUTO: 24.3 PG (ref 26.5–34)
MCHC RBC AUTO-ENTMCNC: 31.4 G/DL (ref 31.5–36.3)
MCV RBC AUTO: 77.4 FL (ref 80–98)
PLATELET # BLD AUTO: 227 10*3/MM3 (ref 150–450)
PMV BLD AUTO: 8.7 FL (ref 8–12)
POTASSIUM BLD-SCNC: 3.7 MMOL/L (ref 3.5–5.1)
PROT SERPL-MCNC: 5.8 G/DL (ref 6.3–8.6)
RBC # BLD AUTO: 4.11 10*6/MM3 (ref 4.37–5.74)
SODIUM BLD-SCNC: 141 MMOL/L (ref 137–145)
WBC NRBC COR # BLD: 11.75 10*3/MM3 (ref 3.2–9.8)

## 2017-04-16 PROCEDURE — 82962 GLUCOSE BLOOD TEST: CPT

## 2017-04-16 PROCEDURE — 25010000002 HEPARIN (PORCINE) PER 1000 UNITS: Performed by: INTERNAL MEDICINE

## 2017-04-16 PROCEDURE — 80053 COMPREHEN METABOLIC PANEL: CPT | Performed by: INTERNAL MEDICINE

## 2017-04-16 PROCEDURE — 85027 COMPLETE CBC AUTOMATED: CPT | Performed by: INTERNAL MEDICINE

## 2017-04-16 PROCEDURE — 83735 ASSAY OF MAGNESIUM: CPT | Performed by: INTERNAL MEDICINE

## 2017-04-17 NOTE — PAYOR COMM NOTE
"Ingrid Harris (Dcsd. Male)     Date of Birth Social Security Number Address Home Phone MRN    1955  [address] [id number] [phone] [id number]    Rastafari Marital Status          Hindu        Admission Date Admission Type Admitting Provider Attending Provider Department, Room/Bed    17 Urgent Hayley Valderrama MD  [facility] 3 EAST, 358/1    Discharge Date Discharge Disposition Discharge Destination        2017 [facility] ([place] - [facility])             Attending Provider: (none)    Allergies:  No Known Allergies    Isolation:  None   Infection:  None   Code Status:  Prior    Ht:  72.64\" (184.5 cm)   Wt:  394 lb 10 oz (179 kg)    Admission Cmt:  None   Principal Problem:  NSTEMI (non-ST elevated myocardial infarction) [I21.4]                 Active Insurance as of 2017     Primary Coverage     Payor Plan Insurance Group Employer/Plan Group    [facility] [employer]UE [employer] [facility] [employer] EMPL[employer]EE [id number]     Payor Plan Address Payor Plan Phone Number Effective From Effective To    [address] [phone] 2015     [place] [address]       Subscriber Name Subscriber Birth Date Member ID       INGRID HARRIS 1955 [id number]                 Emergency Contacts      (Rel.) Home Phone Work Phone Mobile Phone    Christina Harris (Spouse) [phone] -- --               Discharge Summary      Himasnhu Tovar MD at 2017  3:35 PM           [address] [address]  T - [phone]     DISCHARGE SUMMARY         PATIENT  DEMOGRAPHICS   PATIENT NAME: Ingrid Harris                      PHYSICIAN: Himanshu Tovar MD  : 1955  MRN: [id number]    ADMISSION/DISCHARGE INFO   ADMISSION DATE: 2017   DISCHARGE DATE:     ADMISSION DIAGNOSES: Bacterial pneumonia [J15.9]  Respiratory failure [J96.90]  Pulmonary hypertension " [I27.2]  DISCHARGE DIAGNOSES: NSTEMI                                                 Type II Diabetes Mellitus                                                 Pulmonary Hypertension                                                 Heart Failure                                                 Renal Failure    Principal Problem:    NSTEMI (non-ST elevated myocardial infarction)  Active Problems:    Type 2 diabetes mellitus    Osteoarthritis of multiple joints    Essential hypertension    Bacterial pneumonia    Morbid obesity with BMI of 50.0-59.9, adult    Acute cystitis    Heart failure with preserved ejection fraction, borderline, class III    Pulmonary hypertension    Anemia      SERVICE: Family Medicine   CONSULTS   Consult Orders (all)     Start     Ordered    04/13/17 0909  Inpatient Consult to Case Management   Once     Provider:  (Not yet assigned)    04/13/17 0909    04/13/17 0907  Inpatient Consult to Case Management   Once     Provider:  (Not yet assigned)    04/13/17 0906    04/10/17 0800  Inpatient Consult to Heart Failure Navigator  Once     Provider:  (Not yet assigned)    04/10/17 0759    04/06/17 1538  Inpatient Consult to Urology  Once     Specialty:  Urology  Provider:  Blake Lopez MD    04/06/17 1538    04/05/17 2049  Inpatient Consult to Nutrition  Once     Provider:  (Not yet assigned)    04/05/17 2048    04/05/17 2047  Inpatient Consult to Nephrology  Once     Specialty:  Nephrology  Provider:  Alanna Brand MD    04/05/17 2046 04/05/17 2018  Inpatient Consult to Cardiology  Once     Specialty:  Cardiology  Provider:  Bharath Dobbins MD PhD    04/05/17 2017 04/05/17 1857  Inpatient Consult to Cardiology  Once,   Status:  Canceled     Specialty:  Cardiology  Provider:  Bharath Dobbins MD PhD    04/05/17 1857          PROCEDURES     Imaging Results (last 24 hours)     Procedure Component Value Units Date/Time    IR insert non-tunneled CVC 5+  [[id number]] Resulted:  04/13/17 1545     Updated:  04/13/17 1547    Narrative:       OPERATIVE NOTE  Ingrid Harris  1955  * No dates entered *    PREOP DIAGNOSES:  Acute kidney injury requiring short term hemodialysis.    POSTOP DIAGNOSES:   Acute kidney injury requiring short term hemodialysis.    PROCEDURE:   Placement non-tunnelled central venous catheter (13Fr trialysis)  Vascular ultrasound guidance    SURGEON: BRENDA Chapman MD [facility] RPVI    ASSISTANT: Stephenie Muñoz RN     ANESTHESIA: local 1% lidocaine    COMPLICATIONS: none    DESCRIPTION OF OPERATION: In CCU, patient placed in supine position,   prepped and draped in sterile fashion.  Vascular ultrasound was used to   identify the right common femoral vein which was 10mm in diameter and   patent.  Cannulated via modified Seldinger technique with mini stick under   ultrasound guidance.  Exchanged for a 0.035 guidewire and serial dilators.    A 13Fr Trialysis catheter was placed, aspirated and flushed without   difficulty with Hep-Lock solution. catheter secured to the skin with 2-0   nylon suture.  Sterile dressing applied. patient tolerated procedure well.   dialysis aware and present to begin treatment.             This document has been electronically signed by Catarino Chapman MD   on April 13, 2017 3:46 PM               Ct Abdomen Pelvis Without Contrast    Result Date: 4/10/2017  Narrative: [facility] Patient Name: INGRID HARRIS ORDERING: ROSA MARIA HOLT ATTENDING: RIC GUILLORY REFERRING: ROSA MARIA HOLT ----------------------- PROCEDURE: CT abdomen and pelvis without contrast on 4/10/2017 CLINICAL INDICATION:  Bilateral ureteral stones, acute renal insufficiency, right hydronephrosis TECHNIQUE: Multiple axial images are obtained throughout the abdomen and pelvis without the administration of contrast. This study was performed with techniques to keep radiation doses as low as reasonably achievable, (ALARA).  Total DLP is 5847.1 mGy*cm. COMPARISON: 2/10/2017 FINDINGS: ABDOMEN: There has been development of moderate size bilateral pleural effusions with adjacent bibasilar atelectasis and likely component of pneumonia in the right lower lobe. Right ureteral stent is noted extending from the upper pole collecting system of the right kidney into the right aspect of the bladder and appears in good position. There remains a large right renal pelvis stone measuring up to 2.4 cm in greatest diameter with other smaller nonobstructing right renal stones. There are no ureteral stones and no hydronephrosis. No left-sided renal or ureteral stone is now noted. Stable left adrenal nodule has Hounsfield unit measurements consistent with an adenoma. The unenhanced solid abdominal organs are otherwise unremarkable. There is no abdominal adenopathy. There is no free fluid or free air within the abdomen. The abdominal portion of the GI tract is unremarkable. Pelvis: There is a moderate-sized umbilical hernia containing only fat. Meier catheter extends into the decompressed bladder. There is no free fluid in the pelvis. There is no pelvic adenopathy. There is mild diverticulosis. The pelvic portion of the GI tract is otherwise unremarkable. Degenerative changes are noted in the spine and hips.     Impression: CONCLUSION: 1. Bilateral pleural effusions with bibasilar atelectasis and likely component of pneumonia at least in the right lower lobe. 2. Right ureteral stent in good position with continued right nephrolithiasis without hydronephrosis or ureteral stone. 3. Moderate-sized umbilical hernia containing fat. 4. Mild diverticulosis. Electronically signed by:  Benjamín Roy  4/10/2017 9:32 PM CDT Workstation: [id number]    Xr Chest 2 View    Result Date: 4/5/2017  Narrative: Patient Name:  INGRID HARRIS Patient ID:  [id number] Ordering:  NATHALIA JARVIS Attending: Referring:  NATHALIA JARVIS  ------------------------------------------------ Chest PA and lateral CLINICAL INDICATION: Shortness of breath COMPARISON: February 4, 2008. FINDINGS: Cardiac silhouette is normal in size. Pulmonary vascularity is unremarkable. Subtle patchy infiltrative changes in both mid and lower lung fields. Above findings suggest pneumonitis. Lungs otherwise clear.     Impression: CONCLUSION: Subtle bilateral patchy infiltrative changes in both mid and lower lung fields. Electronically signed by:  Leo Kelly MD  4/5/2017 10:46 AM CDT Workstation: [employer] [id number]    Nm Lung Scan Perfusion Particulate    Result Date: 4/5/2017  Narrative: Patient Name:  INGRID HARRIS Patient ID:  [id number] Ordering:  SHERON KELLER Attending:  SHERON KELLER Referring:  SHERON KELLER ------------------------------------------------ Nuclear medicine perfusion lung scan History: Shortness of breath. Elevated d-dimer. Correlation: Chest radiograph 4/5/2017. Following the intravenous administration of 6.6 millicuries of technetium 99m MAA, multiple perfusion images obtained. Some inhomogeneous distribution of radionuclide. No peripheral wedge-shaped perfusion defect to indicate pulmonary embolism.     Impression: Conclusion: Low probability for pulmonary embolism. [id number] Electronically signed by:  Gene Phillip MD  4/5/2017 8:47 PM CDT Workstation: [email or web address] - [employer]    Us Guided Vascular Access    Result Date: 4/11/2017  Narrative: This procedure was auto-finalized with no dictation required.    Xr Chest 1 View    Result Date: 4/13/2017  Narrative: [facility] Patient Name: INGRID HARRIS ORDERING: LEIDA PHILLIP ATTENDING: LIZ POLO REFERRING: LEIDA PHILLIP ----------------------- PROCEDURE: Chest single view on 4/13/2017 CLINICAL INDICATION:  Respiratory failure, pulmonary edema COMPARISON: 4/10/2017 FINDINGS: There has been mild improvement in bilateral opacities consistent with mild improving edema or pneumonia. Heart  is upper limits normal for size. There is a probable small left pleural effusion.     Impression: CONCLUSION: Mild improvement in bilateral edema or pneumonia. Electronically signed by:  Benjamín Roy  4/13/2017 5:51 AM CDT Workstation: [id number]    Xr Chest 1 View    Result Date: 4/10/2017  Narrative: Exam: AP portable chest INDICATION: Respiratory failure COMPARISON: 4/8/2017 FINDINGS: AP portable chest. Heart is enlarged. The pulmonary vascularity is prominent. No change in bilateral patchy airspace opacity. Cannot exclude small effusions.     Impression: No significant interval change. Electronically signed by:  Vicente Nascimento MD  4/10/2017 6:36 AM CDT Workstation: [id number]    Xr Chest 1 View    Result Date: 4/8/2017  Narrative: Exam: AP portable chest INDICATION: Pneumonia COMPARISON: 4/6/2016 FINDINGS: AP portable chest. Heart size upper limits normal. No change in bilateral airspace opacity. Pulmonary vascularity is mildly prominent. Cannot exclude a small Left pleural effusion.     Impression: No significant interval change in bilateral airspace opacity Electronically signed by:  Vicente Nascimento MD  4/8/2017 5:55 AM CDT Workstation: [id number]    Xr Chest 1 View    Result Date: 4/6/2017  Narrative: Exam: AP portable chest INDICATION: Dyspnea COMPARISON: 4/5/2017 FINDINGS: AP portable chest. Heart size upper limits of normal. Interval increase in the bilateral airspace opacity and infiltrates. Pulmonary vascularity is mildly prominent. Cannot exclude a small left pleural effusion.     Impression: Interval increase in the bilateral airspace opacity/infiltrates Electronically signed by:  Vicente Nascimento MD  4/6/2017 9:36 PM CDT Workstation: [id number]    Us Venous Doppler Lower Extremity Bilateral (duplex)    Result Date: 4/6/2017  Narrative: Patient Name:  INGRID HARRIS Patient ID:  [id number] Ordering:  SHERON KELLER Attending:  RIC GUILLORY Referring:  SHERON KELLER  ------------------------------------------------ Doppler duplex venous examination bilateral lower extremities. CLINICAL INDICATION: Leg swelling. COMPARISON: None FINDINGS: The common femoral,  femoral, and popliteal veins of the bilateral     lower extremity are well identified and compress normally.  Doppler signals are heard either spontaneously or on distal compression.  No evidence of intraluminal thrombus was noted. The visualized posterior calf veins are unremarkable.     Impression: CONCLUSION:  No evidence of deep venous thrombosis in the common femoral, superficial femoral veins, or popliteal veins of the bilateral lower extremities.     Electronically signed by:  Leo Kelly MD  4/6/2017 10:09 AM CDT Workstation: [id number]    Ir Insert Non-tunneled Cvc 5+    Result Date: 4/13/2017  Narrative: OPERATIVE NOTE Blake Chavez 1955 * No dates entered * PREOP DIAGNOSES: Acute kidney injury requiring short term hemodialysis. POSTOP DIAGNOSES: Acute kidney injury requiring short term hemodialysis. PROCEDURE: Placement non-tunnelled central venous catheter (13Fr trialysis) Vascular ultrasound guidance SURGEON: BRENDA Chapman MD FACS RPVI ASSISTANT: Stephenie Muñoz RN  ANESTHESIA: local 1% lidocaine COMPLICATIONS: none DESCRIPTION OF OPERATION: In CCU, patient placed in supine position, prepped and draped in sterile fashion.  Vascular ultrasound was used to identify the right common femoral vein which was 10mm in diameter and patent.  Cannulated via modified Seldinger technique with mini stick under ultrasound guidance.  Exchanged for a 0.035 guidewire and serial dilators.  A 13Fr Trialysis catheter was placed, aspirated and flushed without difficulty with Hep-Lock solution. catheter secured to the skin with 2-0 nylon suture.  Sterile dressing applied. patient tolerated procedure well. dialysis aware and present to begin treatment.    This document has been electronically signed by Catarino Villatoro  MD Ari on April 13, 2017 3:46 PM           HISTORY OF PRESENT ILLNESS   Blake Chavez is a 61 y.o. male presented in renal failure and dyspnea.         HOSPITAL COURSE   He was admitted and Vianey Kennedy and Cathie saw him. He underwent a right heart Cath. Which suggested Pulmonary Hypertension. His Renal Function declined and his blood sugar were treated with a Sliding Scale Insulin.He received Dialysiis. He will need to go to the [facility] for continued treatment and will be getting a tunnel catheter.     DISCHARGE CONDITION   Stable    DISPOSITION   [facility]     DISCHARGE MEDICATIONS        Your medication list      START taking these medications       Instructions Last Dose Given Next Dose Due    albumin human 25 % 25%        Infuse 50 mL into a venous catheter As Needed (Hypotension During Dialysis) for up to 4 doses.         atorvastatin 80 MG tablet   Commonly known as:  LIPITOR        Take 1 tablet by mouth Every Night.         bumetanide 0.25 MG/ML injection   Commonly known as:  BUMEX   Start taking on:  4/15/2017        Infuse 8 mL into a venous catheter 2 (Two) Times a Day.         clopidogrel 75 MG tablet   Commonly known as:  PLAVIX        Take 1 tablet by mouth Daily.         dextrose 40 % gel   Commonly known as:  GLUTOSE        Take 15 g by mouth Every 15 (Fifteen) Minutes As Needed for Low Blood Sugar (BS is less than 70 ,is not NPO and can swallow).         dextrose 5 % and sodium chloride 0.45 % 5-0.45 % infusion        Infuse 30 mL/hr into a venous catheter Continuous for 30 days.         dextrose 50 % solution   Commonly known as:  D50W        Infuse 50 mL into a venous catheter Every 15 (Fifteen) Minutes As Needed for Low Blood Sugar for up to 30 days.         ferrous sulfate 324 (65 FE) MG tablet delayed-release EC tablet        Take 1 tablet by mouth 2 (Two) Times a Day With Meals.         glucagon (human recombinant) 1 MG injection   Commonly known as:  GLUCAGEN DIAGNOSTIC         Inject 1 mg under the skin Every 15 (Fifteen) Minutes As Needed (BG< 70 - Pt  sans IV Access - Unresponsive, NPO or Unable To Swallow).         heparin (porcine) 5000 UNIT/ML injection        Inject 1 mL under the skin Every 8 (Eight) Hours.         heparin (porcine) 1000 UNIT/ML injection        Infuse 2 mL into a venous catheter As Needed (For Dialysis Catheter Care.).         insulin aspart 100 UNIT/ML injection   Commonly known as:  novoLOG        Inject 0-7 Units under the skin 4 (Four) Times a Day Before Meals & at Bedtime.         ipratropium-albuterol 0.5-2.5 mg/mL nebulizer   Commonly known as:  DUO-NEB        Take 3 mL by nebulization Every 4 (Four) Hours As Needed for Wheezing or Shortness of Air.         magic butt ointment        Apply 15 g topically 2 (Two) Times a Day.         metoprolol tartrate 25 MG tablet   Commonly known as:  LOPRESSOR        Take 1 tablet by mouth Every 12 (Twelve) Hours.         sodium chloride 0.65 % nasal spray   Commonly known as:  OCEAN        1 spray into each nostril As Needed for Congestion.         sodium chloride 0.9 % bolus        Infuse 1,000 mL into a venous catheter As Needed (to maintain SBP > 90mmHG) for up to 1 day.         sodium chloride 0.9 % flush        Infuse 1-10 mL into a venous catheter As Needed for Line Care.         sodium chloride 0.9 % flush        Infuse 1-10 mL into a venous catheter As Needed for Line Care.           CHANGE how you take these medications       Instructions Last Dose Given Next Dose Due    glyBURIDE 5 MG tablet   Commonly known as:  DIAbeta   What changed:  See the new instructions.        TAKE 2 TABLETS TWICE DAILY BEFORE MEALS           CONTINUE taking these medications       Instructions Last Dose Given Next Dose Due    FE BISGLY-FE [id number]-G35-FA PO              fish oil 1000 MG capsule capsule              gabapentin 100 MG capsule   Commonly known as:  NEURONTIN        TAKE 1 CAPSULE AT BEDTIME FOR FOOT PAIN          Glucosamine 500 MG capsule              HYDROcodone-acetaminophen  MG per tablet   Commonly known as:  NORCO              pantoprazole 40 MG EC tablet   Commonly known as:  PROTONIX              POLY-IRON 150 150 MG capsule   Generic drug:  iron polysaccharides        TAKE 1 CAPSULE TWICE DAILY         VITAMIN D (ERGOCALCIFEROL) PO                STOP taking these medications          amoxicillin-clavulanate 875-125 MG per tablet   Commonly known as:  AUGMENTIN           diltiaZEM 360 MG 24 hr capsule   Commonly known as:  TIAZAC           doxycycline 100 MG capsule   Commonly known as:  VIBRAMYCIN           hydrALAZINE 25 MG tablet   Commonly known as:  APRESOLINE           lisinopril 20 MG tablet   Commonly known as:  PRINIVIL,ZESTRIL           nateglinide 120 MG tablet   Commonly known as:  STARLIX           omeprazole 40 MG capsule   Commonly known as:  priLOSEC           ONE TOUCH ULTRA TEST test strip   Generic drug:  glucose blood           oxyCODONE-acetaminophen 7.5-325 MG per tablet   Commonly known as:  PERCOCET           pravastatin 40 MG tablet   Commonly known as:  PRAVACHOL           promethazine 12.5 MG tablet   Commonly known as:  PHENERGAN           SITagliptin 100 MG tablet   Commonly known as:  JANUVIA                Where to Get Your Medications      These medications were sent to [place], [place] - [address] [facility] [phone] [facility] [phone] [facility] [address]     Phone:  [phone]    • clopidogrel 75 MG tablet   • ferrous sulfate 324 (65 FE) MG tablet delayed-release EC tablet   • sodium chloride 0.65 % nasal spray         Information about where to get these medications is not yet available     ! Ask your nurse or doctor about these medications    • albumin human 25 % 25%   • atorvastatin 80 MG tablet   • bumetanide 0.25 MG/ML injection   • dextrose 40 % gel   • dextrose 5 % and sodium chloride 0.45 % 5-0.45 % infusion   • dextrose 50 %  solution   • glucagon (human recombinant) 1 MG injection   • heparin (porcine) 1000 UNIT/ML injection   • heparin (porcine) 5000 UNIT/ML injection   • insulin aspart 100 UNIT/ML injection   • ipratropium-albuterol 0.5-2.5 mg/mL nebulizer   • magic butt ointment   • metoprolol tartrate 25 MG tablet   • sodium chloride 0.9 % bolus   • sodium chloride 0.9 % flush   • sodium chloride 0.9 % flush             INSTRUCTIONS   Activity: Up with Assistance    Diet: Renal and ADA    Special Instructions: none    FOLLOW UP   Additional Instructions for the Follow-ups that You Need to Schedule     Discharge Follow-up with Specified Provider    As directed    Follow Up Details:  3 - 5 days with KEITH Lynn or [facility]             Follow-up Information     Follow up with KEITH Weiner .    Specialty:  Cardiology    Why:  3 - 5 days with KEITH Lynn or [facility]    Contact information:    [address] [place] [address] [address]  [facility] [address] [phone]          Follow up with Himanshu Tovar MD .    Specialty:  Family Medicine    Contact information:    [address] [place] [address] [address] [place] [address] [phone]          Follow up with [facility] .    Specialty:  [facility]         PENDING TEST RESULTS AT DISCHARGE    Order Current Status    POC Glucose Fingerstick In process                       Himanshu Tovar MD  04/14/17  3:35 PM          Electronically signed by Himanshu Tovar MD at 4/14/2017  3:44 PM

## 2017-04-19 LAB
BACTERIA SPEC AEROBE CULT: NORMAL
BACTERIA SPEC AEROBE CULT: NORMAL

## 2022-12-28 NOTE — PROGRESS NOTES
Follow up with your OB/GYN as scheduled. Follow up sooner for any new or worsening symptoms. Go to the ER for any severe symptoms such as heavy vaginal bleeding (saturating more than 1 pad/tampon per hour), abdominal pain, fever 100.5 or greater. Pharmacokinetics by Pharmacy - Vancomycin    Blake Chavez is a 61 y.o. male   [Ht:  ; Wt: (!) 387 lb 3.2 oz (176 kg)]    Estimated Creatinine Clearance: 40.7 mL/min (by C-G formula based on Cr of 3.1).   Lab Results   Component Value Date    CREATININE 3.10 (H) 04/06/2017    CREATININE 2.99 (H) 04/05/2017    CREATININE 2.11 (H) 03/24/2017      Lab Results   Component Value Date    WBC 15.23 (H) 04/06/2017    WBC 13.98 (H) 04/05/2017    WBC 12.5 (H) 11/08/2016      Temp Readings from Last 1 Encounters:   04/06/17 97.2 °F (36.2 °C) (Temporal Artery )       Indication for use: Pneumonia     Baseline culture results:  Microbiology Results (last 10 days)       Procedure Component Value - Date/Time      S. Pneumo Ag Urine or CSF [[id number]]  (Normal) Collected:  04/05/17 1832    Lab Status:  Final result Specimen:  Urine from Urine, Clean Catch Updated:  04/05/17 2002     Strep Pneumo Ag Negative    Respiratory Culture [[id number]] Collected:  04/05/17 1724    Lab Status:  Final result Specimen:  Sputum from Unknown Updated:  04/05/17 1843     Respiratory Culture Rejected     Gram Stain Result Rare (1+) WBCs per low power field      Moderate (3+) Epithelial cells per low power field      Specimen rejected based upon microscopic exam of gram stain    Mycoplasma Pneumoniae PCR [[id number]] Collected:  04/05/17 1719    Lab Status:  Final result Specimen:  Sputum from Nasopharynx Updated:  04/06/17 0916     MYCOPLASMAE PNEUMONIAE BY PCR Negative    Narrative:       This test is performed by utilizing real time polymerace chain recation (PCR).   This test is performed by utilizing real time polymerace chain recation (PCR).     Blood Culture [[id number]]  (Normal) Collected:  04/05/17 1712    Lab Status:  Preliminary result Specimen:  Blood from Arm, Left Updated:  04/06/17 0601     Blood Culture No growth at less than 24 hours    Blood Culture [[id number]]  (Normal) Collected:  04/05/17 1702    Lab Status:  Preliminary  result Specimen:  Blood from Arm, Right Updated:  04/06/17 0601     Blood Culture No growth at less than 24 hours    Stone Analysis [[id number]] Collected:  03/29/17 1858    Lab Status:  Final result Specimen:  Calculus from Urinary Bladder Updated:  04/05/17 1710     Color Orange     Size Comment mm       Specimen received as fragments.        Stone Weight 112.0 mg      Composition Comment      Percentage (Represents the % composition)        Ca Oxalate - Monohydrate, Stone 30 %      Calcium phosphate, Stone 03 %      Uric Acid, Stone 65 %      Sodium Acid Urate 02 %      Nidus No Nidus visualized     Please note Comment      Calculi report without photograph will follow via computer, mail,  or  delivery.        Additional comment: Comment      Physician questions regarding Calculi Analysis contact [facility] at:  [phone].        Disclaimer: Comment      This test was developed and its performance characteristics  determined by [facility]. It has not been cleared or approved  by the Food and Drug Administration.       Narrative:       Performed at:  [address]  [id number]  : Altaf Carr MD, Phone:  [phone]          Respiratory Culture   Date Value Ref Range Status   04/05/2017 Rejected  Final         Assessment/Plan  Initiated Vancomycin 2000 mg IVPB Q24H. Will order Vancomycin trough level 4/8 @ 0230 (trough goal 15-20).  Pharmacy will monitor renal function and adjust dose accordingly.    Hector Ferrara III, [facility]  04/06/17 10:58 AM

## 2024-04-04 NOTE — PROGRESS NOTES
02/21/2017, 1631 - Patient telephone call is returned per this staff member.  Patient scheduled for Colonoscopy Consultation with Dr. Green Tuesday, March 28, 2017 at 2:00 p.m.  Patient notified of letter of medical necessity has been transcribed per Dr. Green per patient request regarding patient's visit to [facility] Emergency Department 11/04/2016 following Colonoscopy performed 11/03/2016 due to rectal bleed, and will be placed in mail to patient's current mailing address on file.  Patient verbalized understanding.   
4 = No assist / stand by assistance

## (undated) DEVICE — FIBR LASR FLEXIVA 10000MH

## (undated) DEVICE — SOL IRR H2O BTL 1000ML STRL

## (undated) DEVICE — GLV SURG NEOLON 2G PF LF 6.5 STRL

## (undated) DEVICE — CATH URETRL OPEN END W/CONNECT 5F 70CM

## (undated) DEVICE — MODEL BT2000 P/N 700287-012KIT CONTENTS: MANIFOLD WITH SALINE AND CONTRAST PORTS, SALINE TUBING WITH SPIKE AND HAND SYRINGE, TRANSDUCER: Brand: BT2000 AUTOMATED MANIFOLD KIT

## (undated) DEVICE — SOL IRRG H2O PL/BG 1000ML STRL

## (undated) DEVICE — SOL PVPI SPRY BETADINE 3OZ

## (undated) DEVICE — ELECTRODE,RT,STRESS,FOAM,50PK: Brand: MEDLINE

## (undated) DEVICE — GW PTFE FIX/CORE FLXTIP .038 3X150CM

## (undated) DEVICE — GLIDESHEATH BASIC HYDROPHILIC COATED INTRODUCER SHEATH: Brand: GLIDESHEATH

## (undated) DEVICE — CATH URETRL OPN/END 5F70CM

## (undated) DEVICE — SOL IRR NACL 0.9PCT 3000ML

## (undated) DEVICE — GLV SURG SENSICARE GREEN W/ALOE PF LF 6.5 STRL

## (undated) DEVICE — GLV SURG NEOLON 2G PF LF 7.5 STRL

## (undated) DEVICE — PK CATH LAB 60

## (undated) DEVICE — CATH THERMODIL SWANGANZ 4LUM 6F 110CM

## (undated) DEVICE — EVAC BLDR UROVAC W ADAPT

## (undated) DEVICE — PK CYSTO LF 60

## (undated) DEVICE — MODEL AT P65, P/N 701554-001KIT CONTENTS: HAND CONTROLLER, 3-WAY HIGH-PRESSURE STOPCOCK WITH ROTATING END AND PREMIUM HIGH-PRESSURE TUBING: Brand: ANGIOTOUCH® KIT

## (undated) DEVICE — GW PTFE EMERALD HC J TIP STD .025 3MM 150CM

## (undated) DEVICE — CONTAINER,SPECIMEN,OR STERILE,4OZ: Brand: MEDLINE